# Patient Record
Sex: MALE | Race: WHITE | Employment: FULL TIME | URBAN - METROPOLITAN AREA
[De-identification: names, ages, dates, MRNs, and addresses within clinical notes are randomized per-mention and may not be internally consistent; named-entity substitution may affect disease eponyms.]

---

## 2018-08-08 PROBLEM — E66.01 SEVERE OBESITY (BMI 35.0-39.9): Status: ACTIVE | Noted: 2018-08-08

## 2020-03-16 ENCOUNTER — HOSPITAL ENCOUNTER (OUTPATIENT)
Dept: PHYSICAL THERAPY | Age: 70
Discharge: HOME | End: 2020-03-16
Attending: INTERNAL MEDICINE
Payer: MEDICARE

## 2020-03-16 NOTE — THERAPY EVALUATION
Kareen Scherer  : 1950  [place] / [place] / Product Type: Medicare /  [address]  at [facility]  [address], [address] [facility], [address]  [phone]   [phone]        OUTPATIENT PHYSICAL THERAPY:Initial Assessment 3/16/2020    ICD-10: Treatment Diagnosis:   M25.511 Pain in Right Shoulder  M25.611 Stiffness in Right Shoulder  M62.81 Muscle Weakness Generalized    Precautions/Allergies:   Patient has no known allergies. Fall Risk Score: 5 (? 5 = High Risk)  MD Orders: Referral to Physical Therapy MEDICAL/REFERRING DIAGNOSIS:  Nontraumatic shoulder pain, right [M25.511]  Adhesive capsulitis of right shoulder [M75.01]   DATE OF ONSET: 2 months ago  REFERRING PHYSICIAN: Jamia Mario MD  RETURN PHYSICIAN APPOINTMENT: TBD by pt     ASSESSMENT:  Mr. Mello Lucas has attended 1 physical therapy session including the initial evaluation. Pt presents with s/s consistent with referring diagnosis. AROM and PROM limited in similar ranges. Pt has difficulty and pain with ROM. Pt modifying yard work, dressing, and bathing activities. Currently operating below prior level of function with decreased range, decreased strength and increased pain. Recommending skilled PT: manual therapeutic techniques (as appropriate), therapeutic exercises and activities, balance interventions, and a comprehensive home exercise program to address the current impairments, as listed above. Kareen Scherer will benefit from skilled PT (medically necessary) to address above deficits affecting participation in basic ADLs and overall functional tolerance. PROBLEM LIST (Impacting functional limitations):  1. Decreased Strength  2. Decreased ADL/Functional Activities  3. Increased Pain  4. Decreased Flexibility/Joint Mobility  5. Edema/Girth  6.  Decreased [place] with Home Exercise Program INTERVENTIONS PLANNED:  1. Bed Mobility  2. Cold  3. Cryotherapy  4. Electrical Stimulation  5. Family Education  6. Heat  7. Home Exercise Program (HEP)  8. Manual Therapy  9. Neuromuscular Re-education/Strengthening  10. Range of Motion (ROM)  11. Therapeutic Activites  12. Therapeutic Exercise/Strengthening  13. Transcutaneous Electrical Nerve Stimulation (TENS)  14. Transfer Training   TREATMENT PLAN:  TREATMENT PLAN:  Effective Dates: 3/16/2020 TO 6/14/2020 (90 days). Frequency/Duration: 3 times a week (for the first 2 weeks, then 2 times a week) for 90 Days  GOALS: (Goals have been discussed and agreed upon with patient.)  Discharge Goals: Time Frame: 6 weeks  PT will be independent with HEP. Pt will decrease worst pain to 2/10 or less in order to return to cooking ADLs  Pt will reduce DASH to 12 or less in order to return to personal hygiene ADLs  Pt will improve gross LUE strength to 4+/5 or greater in order to return to yardwork  Pt will demonstrate active LT shoulder Flexion and Abduction of 140 or greater in order to return OH ADL    Rehabilitation Potential For Stated Goals: Good  Regarding Kunal Alexander's therapy, I certify that the treatment plan above will be carried out by a therapist or under their direction. Thank you for this referral,  Raheem Do, PT, DPT     Referring Physician Signature: Mirta Mcqueen MD              Date                    The information in this section was collected on 3/16/2020 (except where otherwise noted). HISTORY:   History of Present Injury/Illness (Reason for Referral): About 2 month ago shoulder started hurting. Thought it was a pulled muscle. Not sure what he was doing when it started hurting. Fell on shoulder about 2 weeks after it started hurting. Over the last 2 months, ROM has gotten some better. Usually pretty active, a lot of woodworking.  No instances of shoulder pain in the past.  CC/Primary Concern:        \"My only problem is taking it out of the range it wants to go.\"    Treatment Side: Right    Hospitalization and time spent in care: none    Past Medical History/Comorbidities:   Mr. Omaira Caldera  has a past medical history of Atrial fibrillation ([facility] (9/15/2013), Benign neoplasm of pineal gland ([facility] (9/15/2013), CAD (coronary artery disease), DM (diabetes mellitus) new onset  (6/26/2012), Essential hypertension, benign (9/15/2013), HLD (hyperlipidemia), HTN (hypertension), Hypercholesterolemia, Hypertension (7/12/2013), Irregular heart beat, Obesity, Other and unspecified hyperlipidemia (9/15/2013), Other psoriasis (9/15/2013), Peripheral vascular disease, unspecified [facility] (9/15/2013), Psoriasis, Type 2 diabetes mellitus ([facility] (2012), and Unspecified sleep apnea. He also has no past medical history of Difficult intubation, Malignant hyperthermia due to anesthesia, Nausea & vomiting, Pseudocholinesterase deficiency, or Unspecified adverse effect of anesthesia. Mr. Omaira Caldera  has a past surgical history that includes pr left heart cath,percutaneous (6/27/2012); hx svt ablation; and hx carotid endarterectomy (Right, 07/2013). Social History/Living Environment:     Lives at home alone. Has a support system. Pain/Symptom Location: RT shoulder    Worst Pain: 9/10  Current Pain: 0/10    Aggravating Factors: Reaching up, reaching behind, dressing. Alleviating Factors/Positions/Motions: Stopping motion. General Health: Good    Diagnostic Imaging: X-Rays    Occupation: retired (Still does some woodworking)    Unexplained Weight Loss: No      Prior Level of Function/Work/Activity:  Fully ind    Patient Goals: Return to [place]    Current Medications:       Current Outpatient Medications:     amLODIPine (NORVASC) 5 mg tablet, Take 1 Tab by mouth daily. TAKE 1 TABLET BY MOUTH DAILY, Disp: 90 Tab, Rfl: 3    irbesartan (AVAPRO) 300 mg tablet, Take 1 Tab by mouth daily.  TAKE 1 TABLET BY MOUTH NIGHTLY, Disp: 90 Tab, Rfl: 3    apixaban (ELIQUIS) 5 mg tablet, Take 1 Tab by mouth two (2) times a day. TAKE 1 TABLET BY MOUTH TWICE DAILY, Disp: 180 Tab, Rfl: 3    dapagliflozin (FARXIGA) 10 mg tab tablet, Take 1 Tab by mouth daily. TAKE 1 TABLET BY MOUTH EVERY DAY, Disp: 90 Tab, Rfl: 3    carvediloL (COREG) 12.5 mg tablet, Take 1 Tab by mouth two (2) times daily (with meals). TAKE 1 TABLET BY MOUTH TWICE DAILY WITH MEALS, Disp: 180 Tab, Rfl: 3    diclofenac (VOLTAREN) 1 % gel, Apply 2 g to affected area four (4) times daily for 30 days. , Disp: 3 Each, Rfl: 0    atorvastatin (LIPITOR) 80 mg tablet, TAKE 1 TABLET BY MOUTH EVERY DAY, Disp: 90 Tab, Rfl: 0    metFORMIN (GLUCOPHAGE) 1,000 mg tablet, Take 1 Tab by mouth two (2) times daily (with meals). , Disp: 180 Tab, Rfl: 4    glucose blood VI test strips (ONETOUCH ULTRA BLUE TEST STRIP) strip, CHECK BS QID  DX E 11.9, Disp: 450 Strip, Rfl: 3    lancets 33 gauge misc, Check BS BID Dx: E11.9, Disp: 200 Lancet, Rfl: 3    Blood-Glucose Meter (ONETOUCH VERIO IQ METER) misc, Use as dir   Dx250.00, Disp: 1 Each, Rfl: 1    aspirin 81 mg chewable tablet, Take 81 mg by mouth every morning., Disp: , Rfl:    Date Last Reviewed:  3/16/2020       Ambulatory/Rehab Services H2 Model Falls Risk Assessment    Risk Factors:       (1)  Gender [Male]       (5)  History of Recent Falls [w/in 3 months] Ability to Rise from Chair:       (0)  Ability to rise in a single movement    Falls Prevention Plan:       No modifications necessary   Total: (5 or greater = High Risk): 6    ©2010 [facility] of [employer]. All Rights Reserved. [facility] States Patent #0,489,023. Federal Law prohibits the replication, distribution or use without written permission from [employer]        Number of Personal Factors/Comorbidities that affect the Plan of Care: 3+: HIGH COMPLEXITY   EXAMINATION:   Inspection        Patient Consent: Yes    ________________________________________________________________________________________________  Observation:        Posture:  Forward Head, Ant tipped RT shoulder, Rolled Shoulders            Muscle Guarding: RT scapular muscles        Edema: No Edema, Erythema or Ecchymosis noted          Pitting: No        Movement Apprehension: Elevation of RT shoulder, Ext of RT shoulder, and ER of RT shoulder      ________________________________________________________________________________________________  Range of Motion            Upper:  Joint: Passive Active    Right (Degrees) Right (Degrees)   Shoulder Flexion 110 90   Shoulder Extension  35   Shoulder Abduction 100 75   Shoulder Internal Rotation (Neutral Position)     Shoulder External Rotation  (Neutral Position)     Functional IR  Sacrum   Functional ER  Sub-Occipital         Additional Comments: While attempted to flex elbow, pt experienced increased pain in shoulder. Unable to assess passive ER and IR  ________________________________________________________________________________________________  Strength          Upper Extremity    Joint:     RIGHT   Shoulder Flexion 4/5   Shoulder Extension 4/5   Shoulder Abduction 4/5   Shoulder Internal Rotation 5/5   Shoulder External Rotation 4/5   Elbow Flexion 5/5   Elbow Extension 5/5   ________________________________________________________________________________________________  [facility]        C/O Radicular Symptoms: No        UE NEUROLOGICAL SCREEN: All Dermatomes, Myotomes and Reflexes WNL     ________________________________________________________________________________________________  Special Tests        Shoulder:        AC Joint Compression: Neg, Biceps Load: Neg, Load and Shift: Neg, Theola Seek: Pos and Drop Arm: Neg           ________________________________________________________________________________________________  Palpation: Increased tenderness in scapular muscles and RT shoulder deltoids, Upper traps and levators. Body Structures Involved:  1. Joints  2. Muscles  3.  Ligaments Body Functions Affected:  1. Sensory/Pain  2. Movement Related Activities and Participation Affected:  1. Self Care  2. Domestic Life  3. Interpersonal Interactions and Relationships   Number of elements (examined above) that affect the Plan of Care: 4+: HIGH COMPLEXITY   CLINICAL PRESENTATION:   Presentation: Stable and uncomplicated: LOW COMPLEXITY   CLINICAL DECISION MAKING:   Outcome Measure: Tool Used: Disabilities of the Arm, Shoulder and Hand (DASH) Questionnaire - Quick Version  Score:  Initial: 23/55  Most Recent: X/55 (Date: -- )   Interpretation of Score: The DASH is designed to measure the activities of daily living in person's with upper extremity dysfunction or pain. Each section is scored on a 1-5 scale, 5 representing the greatest disability. The scores of each section are added together for a total score of 55. Medical Necessity:   · Patient is expected to demonstrate progress in strength, range of motion, coordination and functional technique to improve OH, dressing, yardwork and bathing ADLs. Reason for Services/Other Comments:  · Patient continues to require skilled intervention due to pain, weakness, decreased ROM and dyfunction of RUE.    Use of outcome tool(s) and clinical judgement create a POC that gives a: Clear prediction of patient's progress: LOW COMPLEXITY

## 2020-03-16 NOTE — PROGRESS NOTES
Mikaela Jacobo  : 1950  [place] / Product Type: Medicare /  [address] [place]  at [facility]  [address], [address] [facility], [address]  [phone]   [phone]      OUTPATIENT PHYSICAL THERAPY: Daily Treatment Note 3/16/2020  Visit Count:  1    ICD-10: Treatment Diagnosis:   M25.511 Pain in Right Shoulder  M25.611 Stiffness in Right Shoulder  M62.81 Muscle Weakness Generalized     Precautions/Allergies:   Patient has no known allergies. Fall Risk Score: 5 (? 5 = High Risk)    TREATMENT PLAN:  Effective Dates: 3/16/2020 TO 2020 (90 days). Frequency/Duration: 3 times a week (for 2 weeks then 2 times a week) for 90 Days        PRE-TREATMENT SYMPTOMS/COMPLAINTS:  See Eval    MEDICATIONS REVIEWED:  3/16/2020   TREATMENT:   (In addition to Assessment/Re-Assessment sessions the following treatments were rendered)    THERAPEUTIC EXERCISE: (15 minutes):  Exercises per grid below to improve mobility, strength and balance. Required minimal visual and verbal cues to promote proper body alignment and promote proper body posture. Progressed resistance, range and complexity of movement as indicated. Date:  3/16/2020 Date:   Date:     Activity/Exercise Parameters Parameters Parameters   Pulleys x5 min flexion     Flexion Step Backs x3 with 10 sec hold     AAROM with Dowel Abd and Flexion in supine x 5 with 30 sec hold     Pendulums x30 sec                         MANUAL THERAPY: (10 minutes): Joint mobilization, Soft tissue mobilization and Manipulation was utilized and necessary because of the patient's restricted joint motion, painful spasm, loss of articular motion and restricted motion of soft tissue.    +PROM in Flexion, Abd, ER, and IR  +Manual stretch into flexion and Abd    (Used abbreviations: MET - muscle energy technique; PNF - proprioceptive neuromuscular facilitation; NMR - neuromuscular re-education; AP - anterior to posterior; PA - posterior to anterior)    MODALITIES: (0 minutes):      None Today     TREATMENT/SESSION ASSESSMENT:  Gloria Alexander verbalized understanding of role of PT and POC. Pt with good response to mobility as he gained 5-10 deg in each direction (Flexion and abduction). Pt has scapular muscles that guard and decreased overall ROM causing current dysfunction. Education: All TherEx prescribed as HEP    RECOMMENDATIONS/INTENT FOR NEXT TREATMENT SESSION: \"Next visit will focus on advancements to more challenging activities\".     PAIN: Initial: 0/10 Post Session:  3/10     [facility] Portal    Total Treatment Billable Duration:  PT Patient Time In/Time Out  Time In: 5490  Time Out: 0840  Winsome Espino, PT, DPT    Future Appointments   Date Time Provider Tato Apoorva   3/18/2020  9:00 AM [facility] ULTRASOUND 2 SSA [facility] VSA   3/18/2020  9:30 AM Adrien Riggs NP [facility]SA [facility] VSA   3/18/2020  1:45 PM [facility] S[facility]   3/20/2020  8:45 AM Doreen Haas, PTA SFD[facility]PT SFD   3/23/2020  8:00 AM [facility] SFD   3/25/2020  8:45 AM Doreen Haas PTA [facility]PT SFD   3/27/2020  8:45 AM [facility] SFD   3/30/2020  8:45 AM [facility] SFD   4/1/2020  8:45 AM Doreen Haas PTA SFDORPT SFD   4/6/2020  8:45 AM [facility] SFD   4/8/2020  8:45 AM [facility] SFD   4/21/2020 10:15 AM Ruthann Davila MD SSA CIM CIM

## 2020-03-18 ENCOUNTER — HOSPITAL ENCOUNTER (OUTPATIENT)
Dept: PHYSICAL THERAPY | Age: 70
Discharge: HOME | End: 2020-03-18
Attending: INTERNAL MEDICINE
Payer: MEDICARE

## 2020-03-18 PROCEDURE — 97110 THERAPEUTIC EXERCISES: CPT

## 2020-03-18 PROCEDURE — 97140 MANUAL THERAPY 1/> REGIONS: CPT

## 2020-03-18 NOTE — PROGRESS NOTES
Katie Alexis  : 1950  [place] / Product Type: Medicare /  [address]  at [facility]  [address], [address], [address] [place] [address] [address] [facility]  [phone]   [phone]      OUTPATIENT PHYSICAL THERAPY: Daily Treatment Note 3/18/2020  Visit Count:  2    ICD-10: Treatment Diagnosis:   M25.511 Pain in Right Shoulder  M25.611 Stiffness in Right Shoulder  M62.81 Muscle Weakness Generalized     Precautions/Allergies:   Patient has no known allergies. Fall Risk Score: 5 (? 5 = High Risk)    TREATMENT PLAN:  Effective Dates: 3/16/2020 TO 2020 (90 days). Frequency/Duration: 3 times a week (for 2 weeks then 2 times a week) for 90 Days        PRE-TREATMENT SYMPTOMS/COMPLAINTS:  \"Not sure what it was last time, but I feel like my range is getting better. \" Some pain but progressing well. Not started functional IR stretch     MEDICATIONS REVIEWED:  3/18/2020   TREATMENT:   (In addition to Assessment/Re-Assessment sessions the following treatments were rendered)    THERAPEUTIC EXERCISE: (30 minutes):  Exercises per grid below to improve mobility, strength and balance. Required minimal visual and verbal cues to promote proper body alignment and promote proper body posture. Progressed resistance, range and complexity of movement as indicated.      Date:  3/16/2020 Date:  20 Date:     Activity/Exercise Parameters Parameters Parameters   Pulleys x5 min flexion x5min flexion  x3 min abduction     Flexion Step Backs x3 with 10 sec hold     AAROM with Dowel Abd and Flexion in supine x 5 with 30 sec hold Flexion standing x 20 with 2sec    Pendulums x30 sec     Behind back IR  [id number] hold with wand    Wall walks   x10 with 10sec hold    UBE  x6 min (3CW &CCW)      MANUAL THERAPY: (10 minutes): Joint mobilization, Soft tissue mobilization and Manipulation was utilized and necessary because of the patient's restricted joint motion, painful spasm, loss of articular motion and restricted motion of soft tissue. +PROM in Flexion, Abd, ER, and IR  +Manual stretch into flexion and Abd    (Used abbreviations: MET - muscle energy technique; PNF - proprioceptive neuromuscular facilitation; NMR - neuromuscular re-education; AP - anterior to posterior; PA - posterior to anterior)    MODALITIES: (0 minutes):      None Today     TREATMENT/SESSION ASSESSMENT:  Fredrick Shaw presenting with continued loss of ROM and gross shoulder joint stiffness. Greatest loss currently in elevation and ER. Pt given pulley in session today and instructed to continue range exercises until clinic reopens in April from current COVID-19 precautions. Education: All TherEx prescribed as HEP    RECOMMENDATIONS/INTENT FOR NEXT TREATMENT SESSION: \"Next visit will focus on advancements to more challenging activities\".     PAIN: Initial: 0/10 Post Session:  3/10     [facility] Portal    Total Treatment Billable Duration:  PT Patient Time In/Time Out  Time In: 8012  Time Out: [address] [facility], PT, DPT    Future Appointments   Date Time Provider Tato Guerin   3/20/2020  7:00 PM Mariela Manzo [facility] SFD   3/23/2020  7:00 PM [facility] SFD   3/25/2020  7:00 PM Mariela Manzo [facility] SFD   3/27/2020  7:00 PM [facility] SFD   3/30/2020  7:00 PM [facility] SFD   4/1/2020  8:45 AM Mariela Manzo [facility] SFD   4/6/2020  8:45 AM [facility] SFD   4/8/2020  8:45 AM [facility] SFD   4/21/2020 10:15 AM Dustin Rajan MD [facility]

## 2020-07-30 NOTE — THERAPY DISCHARGE
Inderjit Sheppard  : 1950  [place] / [place] / Product Type: Medicare /  [address] [place]  at [address] [facility], [address] [place] [address], [address]  [phone]   [phone]        OUTPATIENT PHYSICAL THERAPY:Discontinuation Summary 2020    ICD-10: Treatment Diagnosis:   M25.511 Pain in Right Shoulder  M25.611 Stiffness in Right Shoulder  M62.81 Muscle Weakness Generalized    Precautions/Allergies:   Patient has no known allergies. Fall Risk Score: 5 (? 5 = High Risk)  MD Orders: Referral to Physical Therapy MEDICAL/REFERRING DIAGNOSIS:  Pain in right shoulder [M25.511]  Adhesive capsulitis of right shoulder [M75.01]   DATE OF ONSET: 2 months ago  REFERRING PHYSICIAN: Xena Bernardo MD  RETURN PHYSICIAN APPOINTMENT: TBD by pt     ASSESSMENT:  Inderjit Sheppard has been seen in physical therapy from 20 to 20 for 2 visits. Treatment has been discontinued at this time due to patient failing to return for additional treatment following pandemic shutdown. Some goals were not met due to failure to reassess. Thank you for this referral.         PROBLEM LIST (Impacting functional limitations):  1. Decreased Strength  2. Decreased ADL/Functional Activities  3. Increased Pain  4. Decreased Flexibility/Joint Mobility  5. Edema/Girth  6. Decreased [place] with Home Exercise Program INTERVENTIONS PLANNED:  1. Bed Mobility  2. Cold  3. Cryotherapy  4. Electrical Stimulation  5. Family Education  6. Heat  7. Home Exercise Program (HEP)  8. Manual Therapy  9. Neuromuscular Re-education/Strengthening  10. Range of Motion (ROM)  11. Therapeutic Activites  12. Therapeutic Exercise/Strengthening  13. Transcutaneous Electrical Nerve Stimulation (TENS)  14. Transfer Training   TREATMENT PLAN:  TREATMENT PLAN:  Effective Dates: 3/16/2020 TO 2020 (90 days).  Frequency/Duration: 3 times a week (for the first 2 weeks, then 2 times a week) for 90 Days  GOALS: (Goals have been discussed and agreed upon with patient.)  Discharge Goals: Time Frame: 6 weeks  PT will be independent with HEP. Pt will decrease worst pain to 2/10 or less in order to return to cooking ADLs  Pt will reduce DASH to 12 or less in order to return to personal hygiene ADLs  Pt will improve gross LUE strength to 4+/5 or greater in order to return to yardwork  Pt will demonstrate active LT shoulder Flexion and Abduction of 140 or greater in order to return OH ADL      Regarding [facility] therapy, I certify that the treatment plan above will be carried out by a therapist or under their direction. Thank you for this referral,  Inderjit Bonds, PT, DPT                 The information in this section was collected on 03/16/20 (except where otherwise noted). HISTORY:   History of Present Injury/Illness (Reason for Referral): About 2 month ago shoulder started hurting. Thought it was a pulled muscle. Not sure what he was doing when it started hurting. Fell on shoulder about 2 weeks after it started hurting. Over the last 2 months, ROM has gotten some better. Usually pretty active, a lot of woodworking. No instances of shoulder pain in the past.  CC/Primary Concern:        \"My only problem is taking it out of the range it wants to go. \"    Treatment Side: Right    Hospitalization and time spent in care: none    Past Medical History/Comorbidities:   Mr. Subha Cartagena  has a past medical history of Atrial fibrillation ([facility] [address] (9/15/2013), Benign neoplasm of pineal gland ([facility] [address] (9/15/2013), CAD (coronary artery disease), DM (diabetes mellitus) new onset  (6/26/2012), Essential hypertension, benign (9/15/2013), HLD (hyperlipidemia), HTN (hypertension), Hypercholesterolemia, Hypertension (7/12/2013), Irregular heart beat, Obesity, Other and unspecified hyperlipidemia (9/15/2013), Other psoriasis (9/15/2013), Peripheral vascular disease, unspecified ([facility] [address] (9/15/2013), Psoriasis, Type 2 diabetes mellitus ([facility] [address] (2012), and Unspecified sleep apnea. He also has no past medical history of Difficult intubation, Malignant hyperthermia due to anesthesia, Nausea & vomiting, Pseudocholinesterase deficiency, or Unspecified adverse effect of anesthesia. Mr. Neri Boyer  has a past surgical history that includes pr left heart cath,percutaneous (6/27/2012); hx svt ablation; and hx carotid endarterectomy (Right, 07/2013). Social History/Living Environment:     Lives at home alone. Has a support system. Pain/Symptom Location: RT shoulder    Worst Pain: 9/10  Current Pain: 0/10    Aggravating Factors: Reaching up, reaching behind, dressing. Alleviating Factors/Positions/Motions: Stopping motion. General Health: Good    Diagnostic Imaging: X-Rays    Occupation: retired (Still does some woodworking)    Unexplained Weight Loss: No      Prior Level of Function/Work/Activity:  Fully ind    Patient Goals: Return to [employer]    Current Medications:       Current Outpatient Medications:     amLODIPine (NORVASC) 5 mg tablet, Take 1 Tab by mouth daily. TAKE 1 TABLET BY MOUTH DAILY, Disp: 90 Tab, Rfl: 3    irbesartan (AVAPRO) 300 mg tablet, Take 1 Tab by mouth daily. TAKE 1 TABLET BY MOUTH NIGHTLY, Disp: 90 Tab, Rfl: 3    apixaban (ELIQUIS) 5 mg tablet, Take 1 Tab by mouth two (2) times a day. TAKE 1 TABLET BY MOUTH TWICE DAILY, Disp: 180 Tab, Rfl: 3    dapagliflozin (FARXIGA) 10 mg tab tablet, Take 1 Tab by mouth daily. TAKE 1 TABLET BY MOUTH EVERY DAY, Disp: 90 Tab, Rfl: 3    carvediloL (COREG) 12.5 mg tablet, Take 1 Tab by mouth two (2) times daily (with meals). TAKE 1 TABLET BY MOUTH TWICE DAILY WITH MEALS, Disp: 180 Tab, Rfl: 3    atorvastatin (LIPITOR) 80 mg tablet, TAKE 1 TABLET BY MOUTH EVERY DAY, Disp: 90 Tab, Rfl: 0    metFORMIN (GLUCOPHAGE) 1,000 mg tablet, Take 1 Tab by mouth two (2) times daily (with meals). , Disp: 180 Tab, Rfl: 4    glucose blood VI test strips (ONETOUCH ULTRA BLUE TEST STRIP) strip, CHECK BS QID  DX E 11.9, Disp: 450 Strip, Rfl: 3    lancets 33 gauge misc, Check BS BID Dx: E11.9, Disp: 200 Lancet, Rfl: 3    Blood-Glucose Meter (ONETOUCH VERIO IQ METER) misc, Use as dir   Dx250.00, Disp: 1 Each, Rfl: 1    aspirin 81 mg chewable tablet, Take 81 mg by mouth every morning., Disp: , Rfl:    Date Last Reviewed:  3/18/2020       Ambulatory/Rehab Services H2 Model Falls Risk Assessment    Risk Factors:       (1)  Gender [Male]       (5)  History of Recent Falls [w/in 3 months] Ability to Rise from Chair:       (0)  Ability to rise in a single movement    Falls Prevention Plan:       No modifications necessary   Total: (5 or greater = High Risk): 6    ©2010 [facility]. All Rights Reserved. [facility] Patent #[id number]. Federal Law prohibits the replication, distribution or use without written permission from [facility] [employer]        Number of Personal Factors/Comorbidities that affect the Plan of Care: 3+: HIGH COMPLEXITY   EXAMINATION:   Body Structures Involved:  1. Joints  2. Muscles  3. Ligaments Body Functions Affected:  1. Sensory/Pain  2. Movement Related Activities and Participation Affected:  1. Self Care  2. Domestic Life  3. Interpersonal Interactions and Relationships   Number of elements (examined above) that affect the Plan of Care: 4+: HIGH COMPLEXITY   CLINICAL PRESENTATION:   Presentation: Stable and uncomplicated: LOW COMPLEXITY   CLINICAL DECISION MAKING:   Outcome Measure: Tool Used: Disabilities of the Arm, Shoulder and Hand (DASH) Questionnaire - Quick Version  Score:  Initial: 23/55  Most Recent: X/55 (Date: -- )   Interpretation of Score: The DASH is designed to measure the activities of daily living in person's with upper extremity dysfunction or pain. Each section is scored on a 1-5 scale, 5 representing the greatest disability. The scores of each section are added together for a total score of 55.            Use of outcome tool(s) and clinical judgement create a POC that gives a: Clear prediction of patient's progress: LOW COMPLEXITY

## 2022-06-13 ENCOUNTER — INITIAL CONSULT (OUTPATIENT)
Dept: CARDIOLOGY CLINIC | Age: 72
End: 2022-06-13
Payer: MEDICARE

## 2022-06-13 VITALS
DIASTOLIC BLOOD PRESSURE: 88 MMHG | HEIGHT: 69 IN | HEART RATE: 109 BPM | SYSTOLIC BLOOD PRESSURE: 130 MMHG | WEIGHT: 265 LBS | BODY MASS INDEX: 39.25 KG/M2

## 2022-06-13 DIAGNOSIS — I25.10 CORONARY ARTERY DISEASE INVOLVING NATIVE HEART, UNSPECIFIED VESSEL OR LESION TYPE, UNSPECIFIED WHETHER ANGINA PRESENT: ICD-10-CM

## 2022-06-13 DIAGNOSIS — I10 ESSENTIAL HYPERTENSION, BENIGN: ICD-10-CM

## 2022-06-13 DIAGNOSIS — I48.91 ATRIAL FIBRILLATION, UNSPECIFIED TYPE (HCC): ICD-10-CM

## 2022-06-13 DIAGNOSIS — R07.9 CHEST PAIN, UNSPECIFIED TYPE: ICD-10-CM

## 2022-06-13 DIAGNOSIS — E78.5 DYSLIPIDEMIA: ICD-10-CM

## 2022-06-13 DIAGNOSIS — I65.29 STENOSIS OF CAROTID ARTERY, UNSPECIFIED LATERALITY: Primary | ICD-10-CM

## 2022-06-13 PROCEDURE — 3017F COLORECTAL CA SCREEN DOC REV: CPT | Performed by: INTERNAL MEDICINE

## 2022-06-13 PROCEDURE — 93000 ELECTROCARDIOGRAM COMPLETE: CPT | Performed by: INTERNAL MEDICINE

## 2022-06-13 PROCEDURE — 1123F ACP DISCUSS/DSCN MKR DOCD: CPT | Performed by: INTERNAL MEDICINE

## 2022-06-13 PROCEDURE — 1036F TOBACCO NON-USER: CPT | Performed by: INTERNAL MEDICINE

## 2022-06-13 PROCEDURE — G8417 CALC BMI ABV UP PARAM F/U: HCPCS | Performed by: INTERNAL MEDICINE

## 2022-06-13 PROCEDURE — 99204 OFFICE O/P NEW MOD 45 MIN: CPT | Performed by: INTERNAL MEDICINE

## 2022-06-13 PROCEDURE — G8428 CUR MEDS NOT DOCUMENT: HCPCS | Performed by: INTERNAL MEDICINE

## 2022-06-13 RX ORDER — NITROGLYCERIN 0.4 MG/1
0.4 TABLET SUBLINGUAL EVERY 5 MIN PRN
Qty: 25 TABLET | Refills: 3 | Status: SHIPPED | OUTPATIENT
Start: 2022-06-13

## 2022-06-13 ASSESSMENT — ENCOUNTER SYMPTOMS
EYE PAIN: 0
ABDOMINAL PAIN: 0
APHONIA: 0
NAIL CHANGES: 0
STRIDOR: 0
COUGH: 0

## 2022-06-13 NOTE — PROGRESS NOTES
[address], [address]  PHONE: [phone]    SUBJECTIVE:   Rachel Valladares is a 73 y.o. male 1950   seen for a consultation visit regarding the following:     Chief Complaint   Patient presents with    Coronary Artery Disease     2/15/2018        Consultation is requested by Tammi Eckert MD  for evaluation of Coronary Artery Disease (2/15/2018)    History of Present Illness: The patient presented for consultation  02/15/2018. Patient with history of coronary artery disease and underwent cardiac catheterization and stenting last in 2012. The patient had additional history of atrial fibrillation and atrial flutter. He presented 02/15/2018 with no changes in symptoms, but has gained a considerable amount of weight over the past several years. He has been treated for this by Dr. Lali Hendrickson, who recommended additional weight loss measures. The patient is attempting to increase his exercise. He currently walks his dogs on a daily basis. He states this is not rigorous physical activity. Additionally, he has a history of right carotid endarterectomy performed by Dr. Mila Simms.       Cardiac History:   1. Cardiac catheterization 2012 - diminished left ventricular systolic function, EF 89%, high grade LAD disease, status post PCI and moderate diffuse disease involving the circumflex and right coronary artery. 2. Atrial flutter ablation. Right sided atrial flutter. 3. The patient is on chronic anticoagulation therapy for atrial fibrillation. 4. History of right carotid endarterectomy.      Assessment and Plan:   1. Coronary disease. 2. Carotid disease. · Previous history of carotid endarterectomy. · Will need serial imaging.    3. Hypertension  Key CAD CHF Meds          amLODIPine (NORVASC) 5 MG tablet (Taking)    Class: Historical Med    apixaban (ELIQUIS) 5 MG TABS tablet (Taking)    Class: Historical Med    atorvastatin (LIPITOR) 80 MG tablet (Taking) Class: Historical Med    carvedilol (COREG) 12.5 MG tablet (Taking)    Class: Historical Med    irbesartan (AVAPRO) 300 MG tablet (Taking)    Class: Historical Med          4. Atrial fibrillation. 5. Aortic stenosis echo pending  >5 years since last echo. Past Medical History, Past Surgical History, Family history, Social History, and Medications were all reviewed with the patient today and updated as necessary. No Known Allergies  Past Medical History:   Diagnosis Date    Atrial fibrillation ([facility] [address] 9/15/2013    Benign neoplasm of pineal gland ([facility] [address] 9/15/2013    CAD (coronary artery disease)     stent x 1     DM (diabetes mellitus) ([facility] [address] 6/26/2012    Essential hypertension, benign 9/15/2013    HLD (hyperlipidemia)     managed with medication     HTN (hypertension)     managed with medication     Hypercholesterolemia     Hypertension 7/12/2013    Irregular heart beat     cardiac ablation corrected    Obesity     BMI 37.5    Other and unspecified hyperlipidemia 9/15/2013    Other psoriasis 9/15/2013    Peripheral vascular disease, unspecified ([facility] [address] 9/15/2013    Psoriasis     Type 2 diabetes mellitus ([facility] [address] 2012    insulin reliant/ Avg FBS 85-90/ no S/S of low BS     Unspecified sleep apnea     no tx at this time. Past Surgical History:   Procedure Laterality Date    ABLATION OF DYSRHYTHMIC FOCUS      CAROTID ENDARTERECTOMY Right 07/2013    LEFT HEART CATH,PERCUTANEOUS  6/27/2012    Xience to mid LAD     Family History   Problem Relation Age of Onset    Diabetes Mother     Cancer Father     Heart Disease Mother     Hypertension Mother      Social History     Tobacco Use    Smoking status: Never Smoker    Smokeless tobacco: Never Used   Substance Use Topics    Alcohol use: Yes       ROS:    Review of Systems   Constitutional: Negative for fever. HENT: Negative for stridor. Eyes: Negative for pain. Cardiovascular: Negative for chest pain.    Respiratory: Negative for cough.    Endocrine: Negative for cold intolerance. Skin: Negative for nail changes. Musculoskeletal: Negative for arthritis. Gastrointestinal: Negative for abdominal pain. Genitourinary: Negative for dysuria. Neurological: Negative for aphonia. Psychiatric/Behavioral: Negative for altered mental status. Allergic/Immunologic: Negative for hives. PHYSICAL EXAM:   BP (!) 136/92   Pulse (!) 109   Ht 5' 9\" (1.753 m)   Wt 265 lb (120.2 kg)   BMI 39.13 kg/m²      Physical Exam  Vitals reviewed. HENT:      Head: Normocephalic. Right Ear: External ear normal.      Left Ear: External ear normal.      Nose: Nose normal.   Eyes:      General: No scleral icterus. Pulmonary:      Effort: Pulmonary effort is normal.   Abdominal:      General: There is no distension. Musculoskeletal:      Cervical back: Neck supple. Skin:     General: Skin is warm. Neurological:      Mental Status: He is alert. Mental status is at baseline. Medical problems and test results were reviewed with the patient today. No results found for any visits on 06/13/22. No results found for this or any previous visit (from the past 672 hour(s)). Lab Results   Component Value Date    CHOL 150 03/09/2020    HDL 28 03/09/2020            PLAN    Gamal Michelle was seen today for coronary artery disease. Diagnoses and all orders for this visit:    Carotid artery stenosis  -     EKG 12 Lead    Essential hypertension, benign  -     EKG 12 Lead    CAD (coronary artery disease)  -     EKG 12 Lead    Dyslipidemia  -     EKG 12 Lead    Chest pain, unspecified type  -     Transthoracic echocardiogram (TTE) complete with contrast, bubble, strain, and 3D PRN; Future  -     Nuclear stress test with myocardial perfusion; Future    Atrial fibrillation, unspecified type ([address]  -     Extended cardiac holter monitor (48 hrs - 15 days);  Future    Other orders  -     nitroGLYCERIN (NITROSTAT) 0.4 MG SL tablet; Place 1 tablet under the tongue every 5 minutes as needed for Chest pain up to max of 3 total doses. If no relief after 1 dose, call 911. Return in about 3 weeks (around 7/4/2022). Thank you for allowing me to participate in this patient's care. Please call or contact me if there are any questions or concerns regarding the above.       Radha West MD  06/13/22  4:08 PM      Proofread, but unrecognized errors may exist.

## 2022-06-24 ENCOUNTER — TELEPHONE (OUTPATIENT)
Dept: CARDIOLOGY CLINIC | Age: 72
End: 2022-06-24

## 2022-06-24 NOTE — TELEPHONE ENCOUNTER
Patient called stating that for the last week, he has been out to [employer], [employer] , [employer], [employer] walking around, grocery shopping, walked to neighbor's, does not have any problems, when he gets in the shower at night, he gets SOB and chest pressure for about 2-3 minutes. Sent monitor back last Saturday. No symptoms today. Wanted to know if Dr. Matheus Johnston knew what could be happening. Taking all medications:  Norvasc 5 mg  Eliquis 5 mg bid  ASA 81 mg  Lipitor 80 mg   Coreg 12.5 mg bid  Farxiga 10 mg  Metformin 1000 mg bid   Patient informed Dr. Matheus Johnston would be asked, call back with doctors response.   Patient voiced understanding//[email or web address]    Last office visit 6-13-22  Cb 6-23-22

## 2022-06-24 NOTE — TELEPHONE ENCOUNTER
Spoke with patient. Patient states he wore monitor and had stress test. Patient states is is having difficulty breathing, chest pain that comes and goes and fatigue. Please advise.

## 2022-06-28 NOTE — TELEPHONE ENCOUNTER
Pt states feels good today, slept well last night. Episodes are random, mostly when showers. Informed pt warm water may be causing blood vessels to dilate, bp drop and HR speed up. That can cause sob, tight feeling in chest. Advise drink a glass of water before shower and possibly use not as hot of water for shower. Discuss at FU. Pt voiced understanding and thanks. He states is good water drinker but has done some aggressive outdoor work recently also. Shoveled a load of mulch without incidence. The following day he felt fatigued, more sob and not able to push mow grass. Sx resolved. No cp, pressure, tightness. Pt states has worn monitor and did have shower episode while wearing the monitor. Confirmed Dr. Kianna Holloway will review and discuss at FU. Pt voiced understanding and thanks.  cgh

## 2022-07-08 ENCOUNTER — APPOINTMENT (OUTPATIENT)
Dept: GENERAL RADIOLOGY | Age: 72
DRG: 273 | End: 2022-07-08
Payer: MEDICARE

## 2022-07-08 ENCOUNTER — OFFICE VISIT (OUTPATIENT)
Dept: CARDIOLOGY CLINIC | Age: 72
End: 2022-07-08
Payer: MEDICARE

## 2022-07-08 ENCOUNTER — DIRECT ADMIT ORDERS (OUTPATIENT)
Dept: CARDIOLOGY CLINIC | Age: 72
End: 2022-07-08

## 2022-07-08 ENCOUNTER — HOSPITAL ENCOUNTER (INPATIENT)
Age: 72
LOS: 5 days | Discharge: HOME | DRG: 273 | End: 2022-07-13
Attending: STUDENT IN AN ORGANIZED HEALTH CARE EDUCATION/TRAINING PROGRAM | Admitting: INTERNAL MEDICINE
Payer: MEDICARE

## 2022-07-08 VITALS
SYSTOLIC BLOOD PRESSURE: 138 MMHG | DIASTOLIC BLOOD PRESSURE: 100 MMHG | HEIGHT: 69 IN | BODY MASS INDEX: 38.58 KG/M2 | WEIGHT: 260.5 LBS | HEART RATE: 111 BPM

## 2022-07-08 DIAGNOSIS — I48.92 ATRIAL FLUTTER (HCC): ICD-10-CM

## 2022-07-08 DIAGNOSIS — I25.118 CORONARY ARTERY DISEASE INVOLVING NATIVE CORONARY ARTERY OF NATIVE HEART WITH OTHER FORM OF ANGINA PECTORIS (HCC): ICD-10-CM

## 2022-07-08 DIAGNOSIS — I48.4 ATYPICAL ATRIAL FLUTTER (HCC): ICD-10-CM

## 2022-07-08 DIAGNOSIS — I50.21 ACUTE HFREF (HEART FAILURE WITH REDUCED EJECTION FRACTION) (HCC): Primary | ICD-10-CM

## 2022-07-08 DIAGNOSIS — I50.23 ACUTE ON CHRONIC SYSTOLIC (CONGESTIVE) HEART FAILURE (HCC): ICD-10-CM

## 2022-07-08 DIAGNOSIS — I44.7 LBBB (LEFT BUNDLE BRANCH BLOCK): ICD-10-CM

## 2022-07-08 DIAGNOSIS — I50.9 CHF (CONGESTIVE HEART FAILURE) (HCC): ICD-10-CM

## 2022-07-08 DIAGNOSIS — I50.9 ACUTE CONGESTIVE HEART FAILURE, UNSPECIFIED HEART FAILURE TYPE (HCC): Primary | ICD-10-CM

## 2022-07-08 DIAGNOSIS — R06.03 RESPIRATORY DISTRESS: ICD-10-CM

## 2022-07-08 DIAGNOSIS — I06.0 RHEUMATIC AORTIC STENOSIS: ICD-10-CM

## 2022-07-08 LAB
ALBUMIN SERPL-MCNC: 3.9 G/DL (ref 3.2–4.6)
ALBUMIN/GLOB SERPL: 1.1 {RATIO} (ref 1.2–3.5)
ALP SERPL-CCNC: 68 U/L (ref 50–136)
ALT SERPL-CCNC: 24 U/L (ref 12–65)
ANION GAP SERPL CALC-SCNC: 7 MMOL/L (ref 7–16)
AST SERPL-CCNC: 23 U/L (ref 15–37)
BASOPHILS # BLD: 0 K/UL (ref 0–0.2)
BASOPHILS NFR BLD: 0 % (ref 0–2)
BILIRUB SERPL-MCNC: 1.5 MG/DL (ref 0.2–1.1)
BUN SERPL-MCNC: 17 MG/DL (ref 8–23)
CALCIUM SERPL-MCNC: 9.6 MG/DL (ref 8.3–10.4)
CHLORIDE SERPL-SCNC: 108 MMOL/L (ref 98–107)
CO2 SERPL-SCNC: 25 MMOL/L (ref 21–32)
CREAT SERPL-MCNC: 0.7 MG/DL (ref 0.8–1.5)
DIFFERENTIAL METHOD BLD: ABNORMAL
EOSINOPHIL # BLD: 0.1 K/UL (ref 0–0.8)
EOSINOPHIL NFR BLD: 1 % (ref 0.5–7.8)
ERYTHROCYTE [DISTWIDTH] IN BLOOD BY AUTOMATED COUNT: 15 % (ref 11.9–14.6)
GLOBULIN SER CALC-MCNC: 3.5 G/DL (ref 2.3–3.5)
GLUCOSE SERPL-MCNC: 143 MG/DL (ref 65–100)
HCT VFR BLD AUTO: 45.3 % (ref 41.1–50.3)
HGB BLD-MCNC: 14.4 G/DL (ref 13.6–17.2)
IMM GRANULOCYTES # BLD AUTO: 0 K/UL (ref 0–0.5)
IMM GRANULOCYTES NFR BLD AUTO: 0 % (ref 0–5)
LYMPHOCYTES # BLD: 1.4 K/UL (ref 0.5–4.6)
LYMPHOCYTES NFR BLD: 13 % (ref 13–44)
MAGNESIUM SERPL-MCNC: 1.9 MG/DL (ref 1.8–2.4)
MCH RBC QN AUTO: 29 PG (ref 26.1–32.9)
MCHC RBC AUTO-ENTMCNC: 31.8 G/DL (ref 31.4–35)
MCV RBC AUTO: 91.3 FL (ref 79.6–97.8)
MONOCYTES # BLD: 0.6 K/UL (ref 0.1–1.3)
MONOCYTES NFR BLD: 6 % (ref 4–12)
NEUTS SEG # BLD: 8.1 K/UL (ref 1.7–8.2)
NEUTS SEG NFR BLD: 80 % (ref 43–78)
NRBC # BLD: 0 K/UL (ref 0–0.2)
NT PRO BNP: 939 PG/ML (ref 5–125)
PLATELET # BLD AUTO: 169 K/UL (ref 150–450)
PMV BLD AUTO: 10.4 FL (ref 9.4–12.3)
POTASSIUM SERPL-SCNC: 4.6 MMOL/L (ref 3.5–5.1)
PROT SERPL-MCNC: 7.4 G/DL (ref 6.3–8.2)
RBC # BLD AUTO: 4.96 M/UL (ref 4.23–5.6)
SODIUM SERPL-SCNC: 140 MMOL/L (ref 136–145)
TROPONIN I SERPL HS-MCNC: 20 PG/ML (ref 0–14)
WBC # BLD AUTO: 10.3 K/UL (ref 4.3–11.1)

## 2022-07-08 PROCEDURE — 94660 CPAP INITIATION&MGMT: CPT

## 2022-07-08 PROCEDURE — 99223 1ST HOSP IP/OBS HIGH 75: CPT | Performed by: INTERNAL MEDICINE

## 2022-07-08 PROCEDURE — 1123F ACP DISCUSS/DSCN MKR DOCD: CPT | Performed by: INTERNAL MEDICINE

## 2022-07-08 PROCEDURE — 2700000000 HC OXYGEN THERAPY PER DAY

## 2022-07-08 PROCEDURE — 85025 COMPLETE CBC W/AUTO DIFF WBC: CPT

## 2022-07-08 PROCEDURE — 96374 THER/PROPH/DIAG INJ IV PUSH: CPT

## 2022-07-08 PROCEDURE — 83036 HEMOGLOBIN GLYCOSYLATED A1C: CPT

## 2022-07-08 PROCEDURE — 93000 ELECTROCARDIOGRAM COMPLETE: CPT | Performed by: INTERNAL MEDICINE

## 2022-07-08 PROCEDURE — 99285 EMERGENCY DEPT VISIT HI MDM: CPT

## 2022-07-08 PROCEDURE — 80053 COMPREHEN METABOLIC PANEL: CPT

## 2022-07-08 PROCEDURE — 84484 ASSAY OF TROPONIN QUANT: CPT

## 2022-07-08 PROCEDURE — 6360000002 HC RX W HCPCS: Performed by: STUDENT IN AN ORGANIZED HEALTH CARE EDUCATION/TRAINING PROGRAM

## 2022-07-08 PROCEDURE — 83735 ASSAY OF MAGNESIUM: CPT

## 2022-07-08 PROCEDURE — 83880 ASSAY OF NATRIURETIC PEPTIDE: CPT

## 2022-07-08 PROCEDURE — 93005 ELECTROCARDIOGRAM TRACING: CPT | Performed by: STUDENT IN AN ORGANIZED HEALTH CARE EDUCATION/TRAINING PROGRAM

## 2022-07-08 PROCEDURE — 71045 X-RAY EXAM CHEST 1 VIEW: CPT

## 2022-07-08 PROCEDURE — 5A09357 ASSISTANCE WITH RESPIRATORY VENTILATION, LESS THAN 24 CONSECUTIVE HOURS, CONTINUOUS POSITIVE AIRWAY PRESSURE: ICD-10-PCS | Performed by: INTERNAL MEDICINE

## 2022-07-08 PROCEDURE — 1100000000 HC RM PRIVATE

## 2022-07-08 PROCEDURE — 2580000003 HC RX 258: Performed by: STUDENT IN AN ORGANIZED HEALTH CARE EDUCATION/TRAINING PROGRAM

## 2022-07-08 RX ORDER — ASPIRIN 81 MG/1
81 TABLET, CHEWABLE ORAL DAILY
Status: DISCONTINUED | OUTPATIENT
Start: 2022-07-08 | End: 2022-07-13 | Stop reason: HOSPADM

## 2022-07-08 RX ORDER — SODIUM CHLORIDE 0.9 % (FLUSH) 0.9 %
5 SYRINGE (ML) INJECTION AS NEEDED
Status: DISCONTINUED | OUTPATIENT
Start: 2022-07-08 | End: 2022-07-13 | Stop reason: HOSPADM

## 2022-07-08 RX ORDER — LOSARTAN POTASSIUM 50 MG/1
100 TABLET ORAL DAILY
Status: DISCONTINUED | OUTPATIENT
Start: 2022-07-09 | End: 2022-07-09

## 2022-07-08 RX ORDER — SODIUM CHLORIDE 9 MG/ML
INJECTION, SOLUTION INTRAVENOUS PRN
Status: DISCONTINUED | OUTPATIENT
Start: 2022-07-08 | End: 2022-07-13 | Stop reason: HOSPADM

## 2022-07-08 RX ORDER — AMLODIPINE BESYLATE 10 MG/1
5 TABLET ORAL DAILY
Status: DISCONTINUED | OUTPATIENT
Start: 2022-07-08 | End: 2022-07-10

## 2022-07-08 RX ORDER — SODIUM CHLORIDE 0.9 % (FLUSH) 0.9 %
5 SYRINGE (ML) INJECTION EVERY 8 HOURS
Status: DISCONTINUED | OUTPATIENT
Start: 2022-07-08 | End: 2022-07-13 | Stop reason: HOSPADM

## 2022-07-08 RX ORDER — POLYETHYLENE GLYCOL 3350 17 G/17G
17 POWDER, FOR SOLUTION ORAL DAILY PRN
Status: DISCONTINUED | OUTPATIENT
Start: 2022-07-08 | End: 2022-07-13 | Stop reason: HOSPADM

## 2022-07-08 RX ORDER — SODIUM CHLORIDE 0.9 % (FLUSH) 0.9 %
5-40 SYRINGE (ML) INJECTION PRN
Status: DISCONTINUED | OUTPATIENT
Start: 2022-07-08 | End: 2022-07-13 | Stop reason: HOSPADM

## 2022-07-08 RX ORDER — ONDANSETRON 4 MG/1
4 TABLET, ORALLY DISINTEGRATING ORAL EVERY 8 HOURS PRN
Status: DISCONTINUED | OUTPATIENT
Start: 2022-07-08 | End: 2022-07-13 | Stop reason: HOSPADM

## 2022-07-08 RX ORDER — INSULIN LISPRO 100 [IU]/ML
0-4 INJECTION, SOLUTION INTRAVENOUS; SUBCUTANEOUS NIGHTLY
Status: DISCONTINUED | OUTPATIENT
Start: 2022-07-08 | End: 2022-07-13 | Stop reason: HOSPADM

## 2022-07-08 RX ORDER — FUROSEMIDE 10 MG/ML
40 INJECTION INTRAMUSCULAR; INTRAVENOUS 2 TIMES DAILY
Status: DISCONTINUED | OUTPATIENT
Start: 2022-07-08 | End: 2022-07-11

## 2022-07-08 RX ORDER — ACETAMINOPHEN 650 MG/1
650 SUPPOSITORY RECTAL EVERY 6 HOURS PRN
Status: DISCONTINUED | OUTPATIENT
Start: 2022-07-08 | End: 2022-07-13 | Stop reason: HOSPADM

## 2022-07-08 RX ORDER — FUROSEMIDE 10 MG/ML
60 INJECTION INTRAMUSCULAR; INTRAVENOUS ONCE
Status: COMPLETED | OUTPATIENT
Start: 2022-07-08 | End: 2022-07-08

## 2022-07-08 RX ORDER — ONDANSETRON 2 MG/ML
4 INJECTION INTRAMUSCULAR; INTRAVENOUS EVERY 6 HOURS PRN
Status: DISCONTINUED | OUTPATIENT
Start: 2022-07-08 | End: 2022-07-13 | Stop reason: HOSPADM

## 2022-07-08 RX ORDER — ATORVASTATIN CALCIUM 80 MG/1
80 TABLET, FILM COATED ORAL NIGHTLY
Status: DISCONTINUED | OUTPATIENT
Start: 2022-07-08 | End: 2022-07-13 | Stop reason: HOSPADM

## 2022-07-08 RX ORDER — INSULIN LISPRO 100 [IU]/ML
0-4 INJECTION, SOLUTION INTRAVENOUS; SUBCUTANEOUS
Status: DISCONTINUED | OUTPATIENT
Start: 2022-07-09 | End: 2022-07-13 | Stop reason: HOSPADM

## 2022-07-08 RX ORDER — ENOXAPARIN SODIUM 100 MG/ML
40 INJECTION SUBCUTANEOUS DAILY
Status: DISCONTINUED | OUTPATIENT
Start: 2022-07-08 | End: 2022-07-08 | Stop reason: CLARIF

## 2022-07-08 RX ORDER — ACETAMINOPHEN 325 MG/1
650 TABLET ORAL EVERY 6 HOURS PRN
Status: DISCONTINUED | OUTPATIENT
Start: 2022-07-08 | End: 2022-07-13 | Stop reason: HOSPADM

## 2022-07-08 RX ORDER — CARVEDILOL 25 MG/1
12.5 TABLET ORAL 2 TIMES DAILY WITH MEALS
Status: DISCONTINUED | OUTPATIENT
Start: 2022-07-08 | End: 2022-07-10

## 2022-07-08 RX ADMIN — FUROSEMIDE 60 MG: 10 INJECTION, SOLUTION INTRAMUSCULAR; INTRAVENOUS at 17:33

## 2022-07-08 RX ADMIN — DEXTROSE 150 MG: 50 INJECTION, SOLUTION INTRAVENOUS at 18:29

## 2022-07-08 RX ADMIN — SODIUM CHLORIDE, PRESERVATIVE FREE 5 ML: 5 INJECTION INTRAVENOUS at 17:33

## 2022-07-08 RX ADMIN — AMIODARONE HYDROCHLORIDE 1 MG/MIN: 50 INJECTION, SOLUTION INTRAVENOUS at 18:31

## 2022-07-08 ASSESSMENT — ENCOUNTER SYMPTOMS
CHEST TIGHTNESS: 0
SHORTNESS OF BREATH: 1
NAUSEA: 0
WHEEZING: 0
COUGH: 0
COLOR CHANGE: 0
BACK PAIN: 0
BLOATING: 1
ABDOMINAL PAIN: 0
CONSTIPATION: 0
SHORTNESS OF BREATH: 1
VOMITING: 0
ABDOMINAL DISTENTION: 0

## 2022-07-08 ASSESSMENT — PAIN - FUNCTIONAL ASSESSMENT: PAIN_FUNCTIONAL_ASSESSMENT: NONE - DENIES PAIN

## 2022-07-08 NOTE — Clinical Note
Accessed site: right femoral vein. Femoral access needle used. Using ultrasound guidance. Number of attempts: 1. Accessed successfully.

## 2022-07-08 NOTE — H&P
chronic anticoagulation therapy for atrial fibrillation.    4.         History of right carotid endarterectomy. 5.         Aortic stenosis:                  2018- mild LVH, normal EF, ind DF, mod AS, mean gradient 18, peak 40, RAMOS 1.5 cmsq  6. JUN 2022- NST - normal perfusion but EF 15%                 Past Medical History, Past Surgical History, Family history, Social History, and Medications were all reviewed with the patient today and updated as necessary.      Home Medications           Prior to Admission medications    Medication Sig Start Date End Date Taking? Authorizing Provider   nitroGLYCERIN (NITROSTAT) 0.4 MG SL tablet Place 1 tablet under the tongue every 5 minutes as needed for Chest pain up to max of 3 total doses.  If no relief after 1 dose, call 911. 6/13/22   Yes Drew Chacon MD   amLODIPine (NORVASC) 5 MG tablet Take 5 mg by mouth daily 3/9/20   Yes Ar Automatic Reconciliation   apixaban (ELIQUIS) 5 MG TABS tablet Take 5 mg by mouth 2 times daily 3/9/20   Yes Ar Automatic Reconciliation   aspirin 81 MG chewable tablet Take 81 mg by mouth 6/30/12   Yes Ar Automatic Reconciliation   atorvastatin (LIPITOR) 80 MG tablet TAKE 1 TABLET BY MOUTH EVERY DAY 8/14/19   Yes Ar Automatic Reconciliation   carvedilol (COREG) 12.5 MG tablet Take 12.5 mg by mouth 2 times daily (with meals) 3/9/20   Yes Ar Automatic Reconciliation   dapagliflozin (FARXIGA) 10 MG tablet Take 10 mg by mouth daily 3/9/20   Yes Ar Automatic Reconciliation   irbesartan (AVAPRO) 300 MG tablet Take 300 mg by mouth daily 3/9/20   Yes Ar Automatic Reconciliation   metFORMIN (GLUCOPHAGE) 1000 MG tablet Take 1,000 mg by mouth 2 times daily (with meals) 4/16/19   Yes Ar Automatic Reconciliation         No Known Allergies  Past Medical History        Past Medical History:   Diagnosis Date    Atrial fibrillation ([address] 9/15/2013    Benign neoplasm of pineal gland ([facility] 9/15/2013    CAD (coronary artery disease)       stent x 1     [facility] 6/26/2012    Essential hypertension, benign 9/15/2013    HLD (hyperlipidemia)       managed with medication     HTN (hypertension)       managed with medication     Hypercholesterolemia      Hypertension 7/12/2013    Irregular heart beat       cardiac ablation corrected    Obesity       BMI 37.5    Other and unspecified hyperlipidemia 9/15/2013    Other psoriasis 9/15/2013    Peripheral vascular disease, unspecified [facility] 9/15/2013    Psoriasis      [facility] 2012     insulin reliant/ Avg FBS 85-90/ no S/S of low BS     Unspecified sleep apnea       no tx at this time.          Past Surgical History         Past Surgical History:   Procedure Laterality Date    ABLATION OF DYSRHYTHMIC FOCUS        CAROTID ENDARTERECTOMY Right 07/2013    LEFT HEART CATH,PERCUTANEOUS   6/27/2012     Xience to mid LAD         Family History         Family History   Problem Relation Age of Onset    Diabetes Mother      Cancer Father      Heart Disease Mother      Hypertension Mother           Social History           Tobacco Use    Smoking status: Never Smoker    Smokeless tobacco: Never Used   Substance Use Topics    Alcohol use: Yes         ROS:     Review of Systems   Constitutional: Positive for diaphoresis and malaise/fatigue. Respiratory: Positive for shortness of breath.     Gastrointestinal: Positive for bloating.            PHYSICAL EXAM:   BP (!) 138/100   Pulse (!) 111   Ht 5' 9\" (1.753 m)   Wt 260 lb 8 oz (118.2 kg)   BMI 38.47 kg/m²           Wt Readings from Last 3 Encounters:   07/08/22 260 lb 8 oz (118.2 kg)   07/08/22 265 lb (120.2 kg)   06/23/22 265 lb (120.2 kg)          BP Readings from Last 3 Encounters:   07/08/22 (!) 138/100   06/23/22 (!) 137/93   06/13/22 130/88          Pulse Readings from Last 3 Encounters:   07/08/22 (!) 111   06/23/22 (!) 110   06/13/22 (!) 109               Physical Exam  Constitutional:       Appearance: Normal appearance. HENT:      Head: Normocephalic and atraumatic. Eyes:      Conjunctiva/sclera: Conjunctivae normal.   Neck:      Vascular: JVD present. No carotid bruit. Cardiovascular:      Rate and Rhythm: Tachycardia present. Rhythm irregular. Heart sounds: Murmur heard. Harsh mid to late systolic murmur is present with a grade of 3/6 at the upper right sternal border radiating to the neck. No friction rub. No gallop. Pulmonary:      Effort: Respiratory distress present. Breath sounds: No wheezing or rales. Abdominal:      General: Abdomen is flat. There is distension. Palpations: Abdomen is soft. Tenderness: There is no abdominal tenderness. Musculoskeletal:         General: No swelling. Cervical back: Neck supple. Skin:     General: Skin is warm and dry. Neurological:      General: No focal deficit present. Mental Status: He is alert.    Psychiatric:         Mood and Affect: Mood normal.         Behavior: Behavior normal.            Medical problems and test results were reviewed with the patient today.      DATA REVIEW                 Lab Results   Component Value Date     MCV 88.7 03/09/2020      LIPID PANEL            Lab Results   Component Value Date     CHOL 150 03/09/2020            Lab Results   Component Value Date     TRIG 147 03/09/2020            Lab Results   Component Value Date     HDL 28 (L) 03/09/2020            Lab Results   Component Value Date     LDLCALC 93 03/09/2020            Lab Results   Component Value Date     LABVLDL 29 03/09/2020      No results found for: CHOLHDLRATIO     BMP        Lab Results   Component Value Date/Time      03/09/2020 11:24 AM     K 4.8 03/09/2020 11:24 AM      03/09/2020 11:24 AM     CO2 20 03/09/2020 11:24 AM     BUN 16 03/09/2020 11:24 AM     CREATININE 0.73 03/09/2020 11:24 AM     GLUCOSE 142 03/09/2020 11:24 AM     CALCIUM 9.4 03/09/2020 11:24 AM            EKG     Suspect atypical atrial flutter with RVR  LBBB     CXR/IMAGING           DEVICE INTERROGATION           OUTSIDE RECORDS REVIEW     Records from outside providers have been reviewed and summarized as noted in the HPI, past history and data review sections of this note, and reviewed with patient. .         ASSESSMENT and PLAN     Ebonie Kaur was seen today for shortness of breath and abdominal pain.     Diagnoses and all orders for this visit:     Acute HFrEF (heart failure with reduced ejection fraction) ([facility])     Atypical atrial flutter ([facility])  -     EKG 12 Lead     Rheumatic aortic stenosis     LBBB (left bundle branch block)     Coronary artery disease involving native coronary artery of native heart with other form of angina pectoris ([facility])              IMPRESSION:     NYHA IV heart failure with ascites and pleural effusions suspected by limited echo images, longstanding LBBB, established coronary disease, aortic stenosis and possible recurrent atypical atrial flutter with RVR.      Requires urgent hospital admission for diuresis, r/o MI, rate control. Echo when able to lie flat enough for images. Pending course may need urgent cath. (no CP but florid heart failure). Given 4 ASA here to chew, supplemental oxygen, starting IV and EMS has been summoned. Dr Jaquelin Tate aware.             Return for EVELINA Ignacio 8 you for allowing me to participate in this patient's care.   Please call or contact me if there are any questions or concerns regarding the above.

## 2022-07-08 NOTE — Clinical Note
TRANSFER - OUT REPORT:     Verbal report given to: 3rd floorrn. Report consisted of patient's Situation, Background, Assessment and   Recommendations(SBAR). Opportunity for questions and clarification was provided.

## 2022-07-08 NOTE — ED TRIAGE NOTES
Patient arrives via EMS from PCP. Patient c/o BLE swelling, abdominal swelling and SOB. EMS placed patient on cpap for tachypnea and labored WOB. 1mg of ativan IV given to tolerate cpap at 1636. Given 1inch nitro paste 1653. Patient A+Ox4. Vitals: /96, HR 90, RR 20, O2 sat 98% on cpap. No BGL or oral temp obtained r/t patient condition per EMS.

## 2022-07-08 NOTE — PROGRESS NOTES
Patient placed on BiPAP. Monitoring in place. Alarms set and audible. Patient is awake and alert, and tolerating BiPAP well.

## 2022-07-08 NOTE — Clinical Note
TRANSFER - OUT REPORT:     Verbal report given to: Tele. Report consisted of patient's Situation, Background, Assessment and   Recommendations(SBAR). Opportunity for questions and clarification was provided. Patient transported with a Registered Nurse and [address] [place] [address] / [facility] [employer].

## 2022-07-08 NOTE — ED PROVIDER NOTES
[employer] Emergency Department Provider Note                   PCP:                Saleem Singh MD               Age: 73 y.o. Sex: male     No diagnosis found. DISPOSITION          MDM  Number of Diagnoses or Management Options  Diagnosis management comments: Cardiology aware of patient, discussed patient EKG with on-call cardiologist who agrees this EKG likely represents findings of left bundle branch block and developing heart failure only. No indication for STEMI. Compared this tracing to previous tracings and it appears fairly consistent with those tracings on file. Will leave BiPAP in place for now, obtain labs and x-ray. Cardiology apparently is expecting patient and will consult for admission. Amount and/or Complexity of Data Reviewed  Clinical lab tests: ordered and reviewed  Tests in the radiology section of CPT®: reviewed and ordered  Tests in the medicine section of CPT®: ordered and reviewed  Review and summarize past medical records: yes  Discuss the patient with other providers: yes  Independent visualization of images, tracings, or specimens: yes    Risk of Complications, Morbidity, and/or Mortality  Presenting problems: high  Diagnostic procedures: high  Management options: high    Patient Progress  Patient progress: stable       Orders Placed This Encounter   Procedures    XR CHEST PORTABLE    CBC with Auto Differential    Comprehensive Metabolic Panel    Magnesium    Troponin    proBNP, N-TERMINAL    Cardiac Monitor - ED Only    Continuous Pulse Oximetry    EKG 12 Lead    Saline lock IV        Roma Sultana is a 73 y.o. male who presents to the Emergency Department with chief complaint of  No chief complaint on file. 72-year-old male patient presenting via EMS from outpatient cardiology office where he is attempting to undergo echocardiography presents to this department with reports of respiratory distress.   Patient apparently became very dyspneic and tachypneic when attempting to undergo echocardiography. Patient states his symptoms started just prior to this exam but significantly worsened during the procedure. EMS was called and patient was transported to this department. EMS states that patient was very tachypneic initially with sats in the low 90s. Over concern for work of breathing, patient was eventually placed on CPAP. He did require a milligram of Ativan secondary to anxiety from this mask. An inch of Nitropaste was placed in route secondary to concern for heart failure and slight hypertension. At the time of my evaluation on arrival, patient reports improvement in his symptoms. He denies chest pain pressure or tightness. He states he is able to normally sleep lying down in bed but occasionally does prop himself up on pillows. He reports no significant cough or congestion denies fever or chills. EMS reports significant pitting edema in the lower extremities. Review of Systems   Constitutional: Negative for chills and fatigue. HENT: Negative for congestion. Eyes: Negative for visual disturbance. Respiratory: Positive for shortness of breath. Negative for cough, chest tightness and wheezing. Cardiovascular: Positive for leg swelling. Negative for chest pain. Gastrointestinal: Negative for abdominal distention, abdominal pain, constipation, nausea and vomiting. Genitourinary: Negative for difficulty urinating, dysuria, flank pain and hematuria. Musculoskeletal: Negative for back pain, neck pain and neck stiffness. Skin: Negative for color change. Neurological: Negative for dizziness, light-headedness, numbness and headaches. All other systems reviewed and are negative.       Past Medical History:   Diagnosis Date    Atrial fibrillation ([facility] 9/15/2013    Benign neoplasm of pineal gland ([facility] 9/15/2013    CAD (coronary artery disease)     stent x 1     DM (diabetes mellitus) ([facility] 6/26/2012    Essential hypertension, benign 9/15/2013    HLD (hyperlipidemia)     managed with medication     HTN (hypertension)     managed with medication     Hypercholesterolemia     Hypertension 7/12/2013    Irregular heart beat     cardiac ablation corrected    Obesity     BMI 37.5    Other and unspecified hyperlipidemia 9/15/2013    Other psoriasis 9/15/2013    Peripheral vascular disease, unspecified ([facility] 9/15/2013    Psoriasis     Type 2 diabetes mellitus ([facility] 2012    insulin reliant/ Avg FBS 85-90/ no S/S of low BS     Unspecified sleep apnea     no tx at this time. Past Surgical History:   Procedure Laterality Date    ABLATION OF DYSRHYTHMIC FOCUS      CAROTID ENDARTERECTOMY Right 07/2013    LEFT HEART CATH,PERCUTANEOUS  6/27/2012    Xience to mid LAD        Family History   Problem Relation Age of Onset    Diabetes Mother     Cancer Father     Heart Disease Mother     Hypertension Mother            Social Connections:     Frequency of Communication with Friends and Family: Not on file    Frequency of Social Gatherings with Friends and Family: Not on file    Attends Jew Services: Not on file    Active Member of Clubs or Organizations: Not on file    Attends Club or Organization Meetings: Not on file    Marital Status: Not on file        No Known Allergies     Vitals signs and nursing note reviewed. Patient Vitals for the past 4 hrs:   Pulse Resp BP SpO2   07/08/22 1724 (!) 109 20 (!) 140/119 92 %   07/08/22 1721 (!) 110 26 (!) 128/104 98 %          Physical Exam  Vitals and nursing note reviewed. Constitutional:       General: He is not in acute distress. Appearance: Normal appearance. He is normal weight. He is not ill-appearing or toxic-appearing. Comments: Obese though overall generally well-appearing male patient, CPAP in place, alert and oriented x4. Mild acute distress, speaks in clear, fluid sentences. HENT:      Head: Normocephalic and atraumatic.       Right Ear: External ear normal.      Left Ear: External ear normal.      Nose: Nose normal.      Mouth/Throat:      Mouth: Mucous membranes are moist.   Eyes:      General: No scleral icterus. Right eye: No discharge. Left eye: No discharge. Extraocular Movements: Extraocular movements intact. Cardiovascular:      Rate and Rhythm: Normal rate and regular rhythm. Pulses: Normal pulses. Heart sounds: Normal heart sounds. Pulmonary:      Effort: Pulmonary effort is normal. No tachypnea, bradypnea, accessory muscle usage, prolonged expiration or respiratory distress. Breath sounds: Decreased breath sounds present. No wheezing, rhonchi or rales. Comments: Diminished throughout. No significant tachypnea or accessory muscle use. Abdominal:      General: Abdomen is flat. There is no distension. Palpations: There is no mass. Tenderness: There is no abdominal tenderness. There is no right CVA tenderness, left CVA tenderness, guarding or rebound. Hernia: No hernia is present. Comments: Obese, no focal findings. Musculoskeletal:         General: No swelling, tenderness or deformity. Normal range of motion. Cervical back: Normal range of motion. Comments: Bilateral lower extremity edema, 3+ pitting in nature. Skin:     General: Skin is warm. Capillary Refill: Capillary refill takes less than 2 seconds. Neurological:      General: No focal deficit present. Mental Status: He is alert.    Psychiatric:         Mood and Affect: Mood normal.          Procedures      Labs Reviewed   CBC WITH AUTO DIFFERENTIAL   COMPREHENSIVE METABOLIC PANEL   MAGNESIUM   TROPONIN   PROBNP, N-TERMINAL        XR CHEST PORTABLE    (Results Pending)                    ED Course as of 07/08/22 1736 Fri Jul 08, 2022 1731 EKG interpretation: Sinus tachycardia, rate of 110, normal axis, left bundle branch block, some elevation in the ST segment noted in the inferior leads, consistent with previous tracing on file. [BR]      ED Course User Index  [BR] Alejandro Ayala DO        Voice dictation software was used during the making of this note. This software is not perfect and grammatical and other typographical errors may be present. This note has not been completely proofread for errors.      Alejandro Ayala DO  07/08/22 [id number]

## 2022-07-08 NOTE — Clinical Note
TRANSFER - IN REPORT:     Verbal report received from: ER. Report consisted of patient's Situation, Background, Assessment and   Recommendations(SBAR). Opportunity for questions and clarification was provided. Assessment completed upon patient's arrival to unit and care assumed. Patient transported with a Registered Nurse and [address] [place] [address] / [facility] [employer].

## 2022-07-08 NOTE — PROGRESS NOTES
atrial fibrillation.    4. History of right carotid endarterectomy. 5.         Aortic stenosis:                  2018- mild LVH, normal EF, ind DF, mod AS, mean gradient 18, peak 40, RAMOS 1.5 cmsq  6. JUN 2022- NST - normal perfusion but EF 15%            Past Medical History, Past Surgical History, Family history, Social History, and Medications were all reviewed with the patient today and updated as necessary. Prior to Admission medications    Medication Sig Start Date End Date Taking? Authorizing Provider   nitroGLYCERIN (NITROSTAT) 0.4 MG SL tablet Place 1 tablet under the tongue every 5 minutes as needed for Chest pain up to max of 3 total doses.  If no relief after 1 dose, call 911. 6/13/22  Yes Valentino Pimenta, MD   amLODIPine (NORVASC) 5 MG tablet Take 5 mg by mouth daily 3/9/20  Yes Ar Automatic Reconciliation   apixaban (ELIQUIS) 5 MG TABS tablet Take 5 mg by mouth 2 times daily 3/9/20  Yes Ar Automatic Reconciliation   aspirin 81 MG chewable tablet Take 81 mg by mouth 6/30/12  Yes Ar Automatic Reconciliation   atorvastatin (LIPITOR) 80 MG tablet TAKE 1 TABLET BY MOUTH EVERY DAY 8/14/19  Yes Ar Automatic Reconciliation   carvedilol (COREG) 12.5 MG tablet Take 12.5 mg by mouth 2 times daily (with meals) 3/9/20  Yes Ar Automatic Reconciliation   dapagliflozin (FARXIGA) 10 MG tablet Take 10 mg by mouth daily 3/9/20  Yes Ar Automatic Reconciliation   irbesartan (AVAPRO) 300 MG tablet Take 300 mg by mouth daily 3/9/20  Yes Ar Automatic Reconciliation   metFORMIN (GLUCOPHAGE) 1000 MG tablet Take 1,000 mg by mouth 2 times daily (with meals) 4/16/19  Yes Ar Automatic Reconciliation     No Known Allergies  Past Medical History:   Diagnosis Date    Atrial fibrillation ([facility] 9/15/2013    Benign neoplasm of pineal gland ([facility] 9/15/2013    CAD (coronary artery disease)     stent x 1     DM (diabetes mellitus) ([facility] 6/26/2012    Essential hypertension, benign 9/15/2013    HLD (hyperlipidemia) managed with medication     HTN (hypertension)     managed with medication     Hypercholesterolemia     Hypertension 7/12/2013    Irregular heart beat     cardiac ablation corrected    Obesity     BMI 37.5    Other and unspecified hyperlipidemia 9/15/2013    Other psoriasis 9/15/2013    Peripheral vascular disease, unspecified ([facility] 9/15/2013    Psoriasis     Type 2 diabetes mellitus ([facility] 2012    insulin reliant/ Avg FBS 85-90/ no S/S of low BS     Unspecified sleep apnea     no tx at this time. Past Surgical History:   Procedure Laterality Date    ABLATION OF DYSRHYTHMIC FOCUS      CAROTID ENDARTERECTOMY Right 07/2013    LEFT HEART CATH,PERCUTANEOUS  6/27/2012    Xience to mid LAD     Family History   Problem Relation Age of Onset    Diabetes Mother     Cancer Father     Heart Disease Mother     Hypertension Mother      Social History     Tobacco Use    Smoking status: Never Smoker    Smokeless tobacco: Never Used   Substance Use Topics    Alcohol use: Yes       ROS:    Review of Systems   Constitutional: Positive for diaphoresis and malaise/fatigue. Respiratory: Positive for shortness of breath. Gastrointestinal: Positive for bloating. PHYSICAL EXAM:   BP (!) 138/100   Pulse (!) 111   Ht 5' 9\" (1.753 m)   Wt 260 lb 8 oz (118.2 kg)   BMI 38.47 kg/m²      Wt Readings from Last 3 Encounters:   07/08/22 260 lb 8 oz (118.2 kg)   07/08/22 265 lb (120.2 kg)   06/23/22 265 lb (120.2 kg)     BP Readings from Last 3 Encounters:   07/08/22 (!) 138/100   06/23/22 (!) 137/93   06/13/22 130/88     Pulse Readings from Last 3 Encounters:   07/08/22 (!) 111   06/23/22 (!) 110   06/13/22 (!) 109           Physical Exam  Constitutional:       Appearance: Normal appearance. HENT:      Head: Normocephalic and atraumatic. Eyes:      Conjunctiva/sclera: Conjunctivae normal.   Neck:      Vascular: JVD present. No carotid bruit. Cardiovascular:      Rate and Rhythm: Tachycardia present. today for shortness of breath and abdominal pain. Diagnoses and all orders for this visit:    Acute HFrEF (heart failure with reduced ejection fraction) (HCC)    Atypical atrial flutter (HCC)  -     EKG 12 Lead    Rheumatic aortic stenosis    LBBB (left bundle branch block)    Coronary artery disease involving native coronary artery of native heart with other form of angina pectoris (HCC)          IMPRESSION:    NYHA IV heart failure with ascites and pleural effusions suspected by limited echo images, longstanding LBBB, established coronary disease, aortic stenosis and possible recurrent atypical atrial flutter with RVR. Requires urgent hospital admission for diuresis, r/o MI, rate control. Echo when able to lie flat enough for images. Pending course may need urgent cath. (no CP but florid heart failure). Given 4 ASA here to chew, supplemental oxygen, starting IV and EMS has been summoned. Dr Sonia Best aware. Return for 4 [facility] you for allowing me to participate in this patient's care. Please call or contact me if there are any questions or concerns regarding the above.       Florence Shultz MD  07/08/22  4:11 PM

## 2022-07-09 ENCOUNTER — APPOINTMENT (OUTPATIENT)
Dept: NON INVASIVE DIAGNOSTICS | Age: 72
DRG: 273 | End: 2022-07-09
Payer: MEDICARE

## 2022-07-09 LAB
25(OH)D3 SERPL-MCNC: 21.2 NG/ML (ref 30–100)
ANION GAP SERPL CALC-SCNC: 16 MMOL/L (ref 7–16)
BUN SERPL-MCNC: 17 MG/DL (ref 8–23)
CALCIUM SERPL-MCNC: 8.2 MG/DL (ref 8.3–10.4)
CHLORIDE SERPL-SCNC: 109 MMOL/L (ref 98–107)
CHOLEST SERPL-MCNC: 99 MG/DL
CO2 SERPL-SCNC: 21 MMOL/L (ref 21–32)
CREAT SERPL-MCNC: 0.5 MG/DL (ref 0.8–1.5)
ECHO AO ASC DIAM: 3.1 CM
ECHO AO ASCENDING AORTA INDEX: 1.34 CM/M2
ECHO AO ROOT DIAM: 3.2 CM
ECHO AO ROOT INDEX: 1.39 CM/M2
ECHO AR MAX VEL PISA: 3.5 M/S
ECHO AV AREA PEAK VELOCITY: 1 CM2
ECHO AV AREA VTI: 0.9 CM2
ECHO AV AREA/BSA PEAK VELOCITY: 0.4 CM2/M2
ECHO AV AREA/BSA VTI: 0.4 CM2/M2
ECHO AV MEAN GRADIENT: 28 MMHG
ECHO AV MEAN GRADIENT: 28 MMHG
ECHO AV MEAN VELOCITY: 2.5 M/S
ECHO AV PEAK GRADIENT: 47 MMHG
ECHO AV PEAK VELOCITY: 3.4 M/S
ECHO AV REGURGITANT PHT: 518 MS
ECHO AV VELOCITY RATIO: 0.29
ECHO AV VTI: 76.6 CM
ECHO BSA: 2.4 M2
ECHO BSA: 2.4 M2
ECHO EST RA PRESSURE: 15 MMHG
ECHO IVC PROX: 2.6 CM
ECHO LA AREA 2C: 25.3 CM2
ECHO LA AREA 4C: 22 CM2
ECHO LA MAJOR AXIS: 7.1 CM
ECHO LA MINOR AXIS: 6.4 CM
ECHO LA VOL BP: 70 ML (ref 18–58)
ECHO LA VOL/BSA BIPLANE: 30 ML/M2 (ref 16–34)
ECHO LV E' LATERAL VELOCITY: 5 CM/S
ECHO LV E' SEPTAL VELOCITY: 4 CM/S
ECHO LV EDV A2C: 152 ML
ECHO LV EDV A4C: 147 ML
ECHO LV EDV INDEX A4C: 64 ML/M2
ECHO LV EDV NDEX A2C: 66 ML/M2
ECHO LV EJECTION FRACTION A2C: 34 %
ECHO LV EJECTION FRACTION A4C: 32 %
ECHO LV EJECTION FRACTION BIPLANE: 34 % (ref 55–100)
ECHO LV ESV A2C: 100 ML
ECHO LV ESV A4C: 100 ML
ECHO LV ESV INDEX A2C: 43 ML/M2
ECHO LV ESV INDEX A4C: 43 ML/M2
ECHO LV FRACTIONAL SHORTENING: 15 % (ref 28–44)
ECHO LV INTERNAL DIMENSION DIASTOLE INDEX: 2.34 CM/M2
ECHO LV INTERNAL DIMENSION DIASTOLIC: 5.4 CM (ref 4.2–5.9)
ECHO LV INTERNAL DIMENSION SYSTOLIC INDEX: 1.99 CM/M2
ECHO LV INTERNAL DIMENSION SYSTOLIC: 4.6 CM
ECHO LV IVSD: 1.2 CM (ref 0.6–1)
ECHO LV MASS 2D: 264.4 G (ref 88–224)
ECHO LV MASS INDEX 2D: 114.5 G/M2 (ref 49–115)
ECHO LV POSTERIOR WALL DIASTOLIC: 1.2 CM (ref 0.6–1)
ECHO LV RELATIVE WALL THICKNESS RATIO: 0.44
ECHO LVOT AREA: 3.5 CM2
ECHO LVOT AV VTI INDEX: 0.25
ECHO LVOT DIAM: 2.1 CM
ECHO LVOT MEAN GRADIENT: 2 MMHG
ECHO LVOT PEAK GRADIENT: 4 MMHG
ECHO LVOT PEAK VELOCITY: 1 M/S
ECHO LVOT STROKE VOLUME INDEX: 29.1 ML/M2
ECHO LVOT SV: 67.2 ML
ECHO LVOT VTI: 19.4 CM
ECHO MV AREA VTI: 2.4 CM2
ECHO MV E DECELERATION TIME (DT): 171 MS
ECHO MV E VELOCITY: 1.47 M/S
ECHO MV E/E' LATERAL: 29.4
ECHO MV E/E' RATIO (AVERAGED): 33.08
ECHO MV E/E' SEPTAL: 36.75
ECHO MV EROA PISA: 16.3 CM2
ECHO MV LVOT VTI INDEX: 1.43
ECHO MV MAX VELOCITY: 1.6 M/S
ECHO MV MEAN GRADIENT: 4 MMHG
ECHO MV MEAN VELOCITY: 0.9 M/S
ECHO MV PEAK GRADIENT: 11 MMHG
ECHO MV REGURGITANT ALIASING (NYQUIST) VELOCITY: 56 CM/S
ECHO MV REGURGITANT PEAK GRADIENT: 117 MMHG
ECHO MV REGURGITANT PEAK VELOCITY: 5.4 M/S
ECHO MV REGURGITANT RADIUS PISA: 0.5 CM
ECHO MV REGURGITANT VOLUME PISA: 2214.28 ML
ECHO MV REGURGITANT VTIA: 136 CM
ECHO MV VTI: 27.7 CM
ECHO RIGHT VENTRICULAR SYSTOLIC PRESSURE (RVSP): 42 MMHG
ECHO RV BASAL DIMENSION: 4 CM
ECHO RV TAPSE: 1.9 CM (ref 1.7–?)
ECHO TV REGURGITANT MAX VELOCITY: 2.6 M/S
ECHO TV REGURGITANT PEAK GRADIENT: 27 MMHG
EST. AVERAGE GLUCOSE BLD GHB EST-MCNC: 146 MG/DL
EST. AVERAGE GLUCOSE BLD GHB EST-MCNC: 154 MG/DL
GLUCOSE BLD STRIP.AUTO-MCNC: 113 MG/DL (ref 65–100)
GLUCOSE BLD STRIP.AUTO-MCNC: 117 MG/DL (ref 65–100)
GLUCOSE BLD STRIP.AUTO-MCNC: 147 MG/DL (ref 65–100)
GLUCOSE SERPL-MCNC: 122 MG/DL (ref 65–100)
HBA1C MFR BLD: 6.7 % (ref 4.8–5.6)
HBA1C MFR BLD: 7 % (ref 4.8–5.6)
HDLC SERPL-MCNC: 29 MG/DL (ref 40–60)
HDLC SERPL: 3.4 {RATIO}
LDLC SERPL CALC-MCNC: 51.6 MG/DL
MAGNESIUM SERPL-MCNC: 1.8 MG/DL (ref 1.8–2.4)
NT PRO BNP: 909 PG/ML (ref 5–125)
POTASSIUM SERPL-SCNC: 4 MMOL/L (ref 3.5–5.1)
SERVICE CMNT-IMP: ABNORMAL
SODIUM SERPL-SCNC: 146 MMOL/L (ref 136–145)
T4 SERPL-MCNC: 10.2 UG/DL (ref 4.8–13.9)
TRIGL SERPL-MCNC: 92 MG/DL (ref 35–150)
TROPONIN I SERPL HS-MCNC: 27.3 PG/ML (ref 0–14)
TSH, 3RD GENERATION: 1.25 UIU/ML (ref 0.36–3.74)
VLDLC SERPL CALC-MCNC: 18.4 MG/DL (ref 6–23)

## 2022-07-09 PROCEDURE — 2580000003 HC RX 258: Performed by: STUDENT IN AN ORGANIZED HEALTH CARE EDUCATION/TRAINING PROGRAM

## 2022-07-09 PROCEDURE — C8929 TTE W OR WO FOL WCON,DOPPLER: HCPCS

## 2022-07-09 PROCEDURE — 2700000000 HC OXYGEN THERAPY PER DAY

## 2022-07-09 PROCEDURE — 6360000004 HC RX CONTRAST MEDICATION: Performed by: INTERNAL MEDICINE

## 2022-07-09 PROCEDURE — 93458 L HRT ARTERY/VENTRICLE ANGIO: CPT | Performed by: INTERNAL MEDICINE

## 2022-07-09 PROCEDURE — 6360000002 HC RX W HCPCS: Performed by: INTERNAL MEDICINE

## 2022-07-09 PROCEDURE — 83880 ASSAY OF NATRIURETIC PEPTIDE: CPT

## 2022-07-09 PROCEDURE — 6360000002 HC RX W HCPCS

## 2022-07-09 PROCEDURE — 80048 BASIC METABOLIC PNL TOTAL CA: CPT

## 2022-07-09 PROCEDURE — 84436 ASSAY OF TOTAL THYROXINE: CPT

## 2022-07-09 PROCEDURE — 82306 VITAMIN D 25 HYDROXY: CPT

## 2022-07-09 PROCEDURE — 6370000000 HC RX 637 (ALT 250 FOR IP): Performed by: INTERNAL MEDICINE

## 2022-07-09 PROCEDURE — 6370000000 HC RX 637 (ALT 250 FOR IP): Performed by: NURSE PRACTITIONER

## 2022-07-09 PROCEDURE — 99152 MOD SED SAME PHYS/QHP 5/>YRS: CPT | Performed by: INTERNAL MEDICINE

## 2022-07-09 PROCEDURE — 93452 LEFT HRT CATH W/VENTRCLGRPHY: CPT | Performed by: INTERNAL MEDICINE

## 2022-07-09 PROCEDURE — 82962 GLUCOSE BLOOD TEST: CPT

## 2022-07-09 PROCEDURE — 84484 ASSAY OF TROPONIN QUANT: CPT

## 2022-07-09 PROCEDURE — 93306 TTE W/DOPPLER COMPLETE: CPT | Performed by: INTERNAL MEDICINE

## 2022-07-09 PROCEDURE — 2580000003 HC RX 258: Performed by: INTERNAL MEDICINE

## 2022-07-09 PROCEDURE — C1887 CATHETER, GUIDING: HCPCS | Performed by: INTERNAL MEDICINE

## 2022-07-09 PROCEDURE — 80061 LIPID PANEL: CPT

## 2022-07-09 PROCEDURE — 99233 SBSQ HOSP IP/OBS HIGH 50: CPT | Performed by: INTERNAL MEDICINE

## 2022-07-09 PROCEDURE — 36415 COLL VENOUS BLD VENIPUNCTURE: CPT

## 2022-07-09 PROCEDURE — 1100000003 HC PRIVATE W/ TELEMETRY

## 2022-07-09 PROCEDURE — 83735 ASSAY OF MAGNESIUM: CPT

## 2022-07-09 PROCEDURE — 4A023N7 MEASUREMENT OF CARDIAC SAMPLING AND PRESSURE, LEFT HEART, PERCUTANEOUS APPROACH: ICD-10-PCS | Performed by: INTERNAL MEDICINE

## 2022-07-09 PROCEDURE — 84443 ASSAY THYROID STIM HORMONE: CPT

## 2022-07-09 PROCEDURE — B2111ZZ FLUOROSCOPY OF MULTIPLE CORONARY ARTERIES USING LOW OSMOLAR CONTRAST: ICD-10-PCS | Performed by: INTERNAL MEDICINE

## 2022-07-09 PROCEDURE — 6360000002 HC RX W HCPCS: Performed by: NURSE PRACTITIONER

## 2022-07-09 PROCEDURE — 83036 HEMOGLOBIN GLYCOSYLATED A1C: CPT

## 2022-07-09 PROCEDURE — C1894 INTRO/SHEATH, NON-LASER: HCPCS | Performed by: INTERNAL MEDICINE

## 2022-07-09 PROCEDURE — B2151ZZ FLUOROSCOPY OF LEFT HEART USING LOW OSMOLAR CONTRAST: ICD-10-PCS | Performed by: INTERNAL MEDICINE

## 2022-07-09 PROCEDURE — 2500000003 HC RX 250 WO HCPCS: Performed by: INTERNAL MEDICINE

## 2022-07-09 PROCEDURE — 2709999900 HC NON-CHARGEABLE SUPPLY: Performed by: INTERNAL MEDICINE

## 2022-07-09 PROCEDURE — C1769 GUIDE WIRE: HCPCS | Performed by: INTERNAL MEDICINE

## 2022-07-09 RX ORDER — AMIODARONE HYDROCHLORIDE 200 MG/1
200 TABLET ORAL 2 TIMES DAILY
Status: DISCONTINUED | OUTPATIENT
Start: 2022-07-09 | End: 2022-07-11

## 2022-07-09 RX ORDER — LIDOCAINE HYDROCHLORIDE 10 MG/ML
INJECTION, SOLUTION INFILTRATION; PERINEURAL PRN
Status: DISCONTINUED | OUTPATIENT
Start: 2022-07-09 | End: 2022-07-09 | Stop reason: HOSPADM

## 2022-07-09 RX ORDER — SPIRONOLACTONE 25 MG/1
25 TABLET ORAL DAILY
Status: DISCONTINUED | OUTPATIENT
Start: 2022-07-09 | End: 2022-07-12

## 2022-07-09 RX ORDER — HEPARIN SODIUM 200 [USP'U]/100ML
INJECTION, SOLUTION INTRAVENOUS CONTINUOUS PRN
Status: COMPLETED | OUTPATIENT
Start: 2022-07-09 | End: 2022-07-09

## 2022-07-09 RX ADMIN — APIXABAN 5 MG: 5 TABLET, FILM COATED ORAL at 21:04

## 2022-07-09 RX ADMIN — SODIUM CHLORIDE, PRESERVATIVE FREE 5 ML: 5 INJECTION INTRAVENOUS at 21:06

## 2022-07-09 RX ADMIN — CARVEDILOL 12.5 MG: 25 TABLET, FILM COATED ORAL at 00:19

## 2022-07-09 RX ADMIN — ATORVASTATIN CALCIUM 80 MG: 80 TABLET, FILM COATED ORAL at 21:04

## 2022-07-09 RX ADMIN — SACUBITRIL AND VALSARTAN 1 TABLET: 24; 26 TABLET, FILM COATED ORAL at 21:04

## 2022-07-09 RX ADMIN — ASPIRIN 81 MG: 81 TABLET, CHEWABLE ORAL at 08:32

## 2022-07-09 RX ADMIN — SODIUM CHLORIDE, PRESERVATIVE FREE 5 ML: 5 INJECTION INTRAVENOUS at 17:56

## 2022-07-09 RX ADMIN — CARVEDILOL 12.5 MG: 25 TABLET, FILM COATED ORAL at 17:55

## 2022-07-09 RX ADMIN — AMIODARONE HYDROCHLORIDE 200 MG: 200 TABLET ORAL at 08:38

## 2022-07-09 RX ADMIN — PERFLUTREN 3 ML: 6.52 INJECTION, SUSPENSION INTRAVENOUS at 10:23

## 2022-07-09 RX ADMIN — AMIODARONE HYDROCHLORIDE 200 MG: 200 TABLET ORAL at 21:04

## 2022-07-09 RX ADMIN — FUROSEMIDE 40 MG: 10 INJECTION, SOLUTION INTRAMUSCULAR; INTRAVENOUS at 17:54

## 2022-07-09 RX ADMIN — ASPIRIN 81 MG: 81 TABLET, CHEWABLE ORAL at 00:19

## 2022-07-09 RX ADMIN — ATORVASTATIN CALCIUM 80 MG: 80 TABLET, FILM COATED ORAL at 00:19

## 2022-07-09 RX ADMIN — APIXABAN 5 MG: 5 TABLET, FILM COATED ORAL at 00:19

## 2022-07-09 RX ADMIN — CARVEDILOL 12.5 MG: 25 TABLET, FILM COATED ORAL at 08:32

## 2022-07-09 ASSESSMENT — EJECTION FRACTION: EF_VALUE: .28

## 2022-07-09 NOTE — PROGRESS NOTES
Patient has severe nonischemic dilated cardiomyopathy with ejection fraction around 15%. He has moderate diffuse nonobstructive coronary artery disease. He has at least moderate aortic valve stenosis most likely. He would benefit from aggressive guideline directed medical therapy over the next several days including diuresis.   Depending on the echo findings may need to consider when he is more stable a right left heart cath again not for coronary anatomy but for delineation of aortic valve area and that he could have low-flow low gradient aortic stenosis

## 2022-07-09 NOTE — PROGRESS NOTES
Radial compression band removed at 1255 after slowly reducing air to zero as per hospital protocol. No bleeding or hematoma noted. 2 x 2 gauze with tegaderm placed over puncture site. The affected extremity is warm and dry to the touch. Patient instructed to call if any bleeding noted on gauze. Patient verbalized understanding the nursing instructions.

## 2022-07-09 NOTE — PROGRESS NOTES
Admission skin assessment completed with Rory Sung RN and reveals the following: Patient's skin is warm, dry, flaky, pink/red. Scattered scars and abrasions present. The sacrum and heels are intact with no skin breakdown. There is a right radial cath site that is c/d/i with no hematoma.

## 2022-07-09 NOTE — PLAN OF CARE
Problem: Chronic Conditions and Co-morbidities  Goal: Patient's chronic conditions and co-morbidity symptoms are monitored and maintained or improved  Outcome: Progressing  Flowsheets (Taken 7/9/2022 1015)  Care Plan - Patient's Chronic Conditions and Co-Morbidity Symptoms are Monitored and Maintained or Improved:   Monitor and assess patient's chronic conditions and comorbid symptoms for stability, deterioration, or improvement   Collaborate with multidisciplinary team to address chronic and comorbid conditions and prevent exacerbation or deterioration   Update acute care plan with appropriate goals if chronic or comorbid symptoms are exacerbated and prevent overall improvement and discharge     Problem: Discharge Planning  Goal: Discharge to home or other facility with appropriate resources  Outcome: Progressing  Flowsheets (Taken 7/9/2022 1015)  Discharge to home or other facility with appropriate resources:   Identify barriers to discharge with patient and caregiver   Arrange for needed discharge resources and transportation as appropriate   Identify discharge learning needs (meds, wound care, etc)   Refer to discharge planning if patient needs post-hospital services based on physician order or complex needs related to functional status, cognitive ability or social support system     Problem: Safety - Adult  Goal: Free from fall injury  Outcome: Progressing

## 2022-07-09 NOTE — PROGRESS NOTES
TRANSFER - OUT REPORT:    [facility]  [facility]RA  Diagnostic medical mgmt  Versed 2 mg  Heparin 5,000 units  Band 12 ml  No bleeding/hematoma    Verbal report given to Ivanna Granado on Kj Bunn  being transferred to [facility] for routine progression of patient care       Report consisted of patient's Situation, Background, Assessment and   Recommendations(SBAR). Information from the following report(s) Nurse Handoff Report and Event Log was reviewed with the receiving nurse. Lines:   Peripheral IV 07/08/22 Distal;Right; Anterior Cephalic (Active)        Opportunity for questions and clarification was provided.

## 2022-07-09 NOTE — ED NOTES
Report from [facility] transfer of care at this time     Chelsy Medicus, [address] [facility]  07/09/22 [id number]

## 2022-07-09 NOTE — PROGRESS NOTES
am  7/9/2022 7:14 AM    Admit Date: 7/8/2022    Admit Diagnosis: Acute on chronic systolic (congestive) heart failure (HCC) [I50.23]      Subjective:    Patient : Patient admitted from the office for shortness of breath presumed fluid overload from HFrEF. He was attempting to undergo an echocardiogram but could not lay flat for this. While in the emergency room he was noted to be in stable ventricular tachycardia he received intravenous amiodarone which then broke his apparent VT to sinus rhythm with left bundle branch block    Objective:    /74   Pulse (!) 103   Temp 98.9 °F (37.2 °C) (Oral)   Resp 24   Ht 5' 9\" (1.753 m)   Wt 260 lb (117.9 kg)   SpO2 95%   BMI 38.40 kg/m²     ROS:  General ROS: negative for - chills  Hematological and Lymphatic ROS: negative for - blood clots or jaundice  Respiratory ROS: no cough, shortness of breath, or wheezing  Cardiovascular ROS: positive for - dyspnea on exertion  Gastrointestinal ROS: no abdominal pain, change in bowel habits, or black or bloody stools  Neurological ROS: no TIA or stroke symptoms    Physical Exam:      Physical Examination: General appearance - Appearance: alert, well appearing, and in no distress.    Neck/lymph - supple, no significant adenopathy  Chest/CV - clear to auscultation, no wheezes, rales or rhonchi, symmetric air entry  Heart - normal rate, regular rhythm, normal S1, S2, no murmurs, rubs, clicks or gallops  Abdomen/GI - soft, nontender, nondistended, no masses or organomegaly   Musculoskeletal - no joint tenderness, deformity or swelling  Extremities - peripheral pulses normal, no pedal edema, no clubbing or cyanosis  Skin - normal coloration and turgor, no rashes, no suspicious skin lesions noted    Current Facility-Administered Medications   Medication Dose Route Frequency    sodium chloride flush 0.9 % injection 5 mL  5 mL IntraVENous Q8H    sodium chloride flush 0.9 % injection 5 mL  5 mL IntraVENous PRN    aspirin chewable tablet 81 mg  81 mg Oral Daily    apixaban (ELIQUIS) tablet 5 mg  5 mg Oral BID    atorvastatin (LIPITOR) tablet 80 mg  80 mg Oral Nightly    losartan (COZAAR) tablet 100 mg  100 mg Oral Daily    carvedilol (COREG) tablet 12.5 mg  12.5 mg Oral BID WC    amLODIPine (NORVASC) tablet 5 mg  5 mg Oral Daily    sodium chloride flush 0.9 % injection 5-40 mL  5-40 mL IntraVENous PRN    0.9 % sodium chloride infusion   IntraVENous PRN    ondansetron (ZOFRAN-ODT) disintegrating tablet 4 mg  4 mg Oral Q8H PRN    Or    ondansetron (ZOFRAN) injection 4 mg  4 mg IntraVENous Q6H PRN    polyethylene glycol (GLYCOLAX) packet 17 g  17 g Oral Daily PRN    acetaminophen (TYLENOL) tablet 650 mg  650 mg Oral Q6H PRN    Or    acetaminophen (TYLENOL) suppository 650 mg  650 mg Rectal Q6H PRN    furosemide (LASIX) injection 40 mg  40 mg IntraVENous BID    amiodarone (CORDARONE) 450 mg in dextrose 5 % 250 mL infusion ([id number])  1 mg/min IntraVENous Continuous    insulin lispro (HUMALOG) injection vial 0-4 Units  0-4 Units SubCUTAneous TID WC    insulin lispro (HUMALOG) injection vial 0-4 Units  0-4 Units SubCUTAneous Nightly     Current Outpatient Medications   Medication Sig    nitroGLYCERIN (NITROSTAT) 0.4 MG SL tablet Place 1 tablet under the tongue every 5 minutes as needed for Chest pain up to max of 3 total doses. If no relief after 1 dose, call 911.     amLODIPine (NORVASC) 5 MG tablet Take 5 mg by mouth daily    apixaban (ELIQUIS) 5 MG TABS tablet Take 5 mg by mouth 2 times daily    aspirin 81 MG chewable tablet Take 81 mg by mouth    atorvastatin (LIPITOR) 80 MG tablet TAKE 1 TABLET BY MOUTH EVERY DAY    carvedilol (COREG) 12.5 MG tablet Take 12.5 mg by mouth 2 times daily (with meals)    dapagliflozin (FARXIGA) 10 MG tablet Take 10 mg by mouth daily    irbesartan (AVAPRO) 300 MG tablet Take 300 mg by mouth daily    metFORMIN (GLUCOPHAGE) 1000 MG tablet Take 1,000 mg by mouth 2 times daily (with meals) Data Review: data included in this note has been independently reviewed by the author     TELEMETRY: Sinus rhythm with left bundle branch block    Assessment/Plan:     Patient Active Problem List   Diagnosis    Psoriasis    Other psoriasis    Dyslipidemia    Carotid artery stenosis    Essential hypertension, benign    Atrial flutter ([address]    CAD (coronary artery disease)    Acute on chronic systolic (congestive) heart failure (HCC)     PLAN  HFrEF  Patient needs to undergo intensive medical therapy diuresis and cardiac catheterization. He also will need [facility] referral during this time while he is treated for this new onset HFrEF    Pt set up for procedure. Risks benefits and alternatives discussed. Pt agrees to proceed. Risks of bleeding infection emergent surgical procedure loss of life or limb renal failure and other known risks discussed. Pt agrees to proceed and agrees to sign consent form. Current consent form has been signed and visualized and is completed in its entirety by all involved parties. Actual scanning of the paper consent into the chart takes place at a later date and is a process that I am not involved in nor can be held responsible for.       Wilmer Barr MD

## 2022-07-09 NOTE — PROGRESS NOTES
Patient placed on Airvo 55L/70% at this time. Patient tolerated change well. EhJ2=737% after change. No acute distress noted at this time. 0815:  Patient placed on RA with RT at bedside by RN. No acute distress noted. SpO2=92%.

## 2022-07-09 NOTE — PROGRESS NOTES
TRANSFER - IN REPORT:    Verbal report received from DANIEL Laughlin on [employer] being received from [address] [place] [address] for routine progression of care. Report consisted of patients Situation, Background, Assessment and Recommendations(SBAR). Information from the following report(s) SBAR, Kardex, Procedure Summary, Intake/Output, MAR and Recent Results was reviewed. Opportunity for questions and clarification was provided. Assessment completed upon patients arrival to unit and care assumed. Patient received to room 325. Patient connected to monitor and assessment completed. Plan of care reviewed. Patient oriented to room and call light. Patient aware to use call light to communicate any chest pain or needs.

## 2022-07-09 NOTE — ED NOTES
Attempt made to obtain morning labs. Unable to stick patient. Lab called to obtain AM labs.       Fatuma Love RN  07/09/22 1946

## 2022-07-09 NOTE — ED NOTES
Patient transferred via stretcher to cath lab  With cath lab staff     Genevieve Brock RN  07/09/22 [id number]

## 2022-07-10 LAB
ANION GAP SERPL CALC-SCNC: 8 MMOL/L (ref 7–16)
BUN SERPL-MCNC: 21 MG/DL (ref 8–23)
CALCIUM SERPL-MCNC: 9.4 MG/DL (ref 8.3–10.4)
CHLORIDE SERPL-SCNC: 104 MMOL/L (ref 98–107)
CO2 SERPL-SCNC: 27 MMOL/L (ref 21–32)
CREAT SERPL-MCNC: 0.6 MG/DL (ref 0.8–1.5)
EKG ATRIAL RATE: 203 BPM
EKG DIAGNOSIS: NORMAL
EKG P AXIS: 136 DEGREES
EKG P-R INTERVAL: 56 MS
EKG Q-T INTERVAL: 430 MS
EKG QRS DURATION: 173 MS
EKG QTC CALCULATION (BAZETT): 582 MS
EKG R AXIS: -12 DEGREES
EKG T AXIS: 146 DEGREES
EKG VENTRICULAR RATE: 110 BPM
GLUCOSE BLD STRIP.AUTO-MCNC: 125 MG/DL (ref 65–100)
GLUCOSE BLD STRIP.AUTO-MCNC: 143 MG/DL (ref 65–100)
GLUCOSE BLD STRIP.AUTO-MCNC: 165 MG/DL (ref 65–100)
GLUCOSE BLD STRIP.AUTO-MCNC: 171 MG/DL (ref 65–100)
GLUCOSE SERPL-MCNC: 132 MG/DL (ref 65–100)
MAGNESIUM SERPL-MCNC: 2.2 MG/DL (ref 1.8–2.4)
POTASSIUM SERPL-SCNC: 3.8 MMOL/L (ref 3.5–5.1)
SERVICE CMNT-IMP: ABNORMAL
SODIUM SERPL-SCNC: 139 MMOL/L (ref 136–145)

## 2022-07-10 PROCEDURE — 99232 SBSQ HOSP IP/OBS MODERATE 35: CPT | Performed by: INTERNAL MEDICINE

## 2022-07-10 PROCEDURE — 80048 BASIC METABOLIC PNL TOTAL CA: CPT

## 2022-07-10 PROCEDURE — 1100000003 HC PRIVATE W/ TELEMETRY

## 2022-07-10 PROCEDURE — 82962 GLUCOSE BLOOD TEST: CPT

## 2022-07-10 PROCEDURE — 6360000002 HC RX W HCPCS: Performed by: NURSE PRACTITIONER

## 2022-07-10 PROCEDURE — 36415 COLL VENOUS BLD VENIPUNCTURE: CPT

## 2022-07-10 PROCEDURE — 6370000000 HC RX 637 (ALT 250 FOR IP): Performed by: INTERNAL MEDICINE

## 2022-07-10 PROCEDURE — 6370000000 HC RX 637 (ALT 250 FOR IP): Performed by: NURSE PRACTITIONER

## 2022-07-10 PROCEDURE — 83735 ASSAY OF MAGNESIUM: CPT

## 2022-07-10 PROCEDURE — 2580000003 HC RX 258: Performed by: STUDENT IN AN ORGANIZED HEALTH CARE EDUCATION/TRAINING PROGRAM

## 2022-07-10 RX ORDER — ERGOCALCIFEROL 1.25 MG/1
50000 CAPSULE ORAL WEEKLY
Status: DISCONTINUED | OUTPATIENT
Start: 2022-07-10 | End: 2022-07-13 | Stop reason: HOSPADM

## 2022-07-10 RX ORDER — CARVEDILOL 25 MG/1
25 TABLET ORAL 2 TIMES DAILY WITH MEALS
Status: DISCONTINUED | OUTPATIENT
Start: 2022-07-10 | End: 2022-07-11

## 2022-07-10 RX ADMIN — ATORVASTATIN CALCIUM 80 MG: 80 TABLET, FILM COATED ORAL at 20:44

## 2022-07-10 RX ADMIN — FUROSEMIDE 40 MG: 10 INJECTION, SOLUTION INTRAMUSCULAR; INTRAVENOUS at 17:36

## 2022-07-10 RX ADMIN — SACUBITRIL AND VALSARTAN 1 TABLET: 24; 26 TABLET, FILM COATED ORAL at 20:44

## 2022-07-10 RX ADMIN — SODIUM CHLORIDE, PRESERVATIVE FREE 5 ML: 5 INJECTION INTRAVENOUS at 08:51

## 2022-07-10 RX ADMIN — EMPAGLIFLOZIN 10 MG: 10 TABLET, FILM COATED ORAL at 09:46

## 2022-07-10 RX ADMIN — APIXABAN 5 MG: 5 TABLET, FILM COATED ORAL at 08:44

## 2022-07-10 RX ADMIN — ASPIRIN 81 MG: 81 TABLET, CHEWABLE ORAL at 08:44

## 2022-07-10 RX ADMIN — AMIODARONE HYDROCHLORIDE 200 MG: 200 TABLET ORAL at 20:44

## 2022-07-10 RX ADMIN — APIXABAN 5 MG: 5 TABLET, FILM COATED ORAL at 20:44

## 2022-07-10 RX ADMIN — SPIRONOLACTONE 25 MG: 25 TABLET ORAL at 08:44

## 2022-07-10 RX ADMIN — SACUBITRIL AND VALSARTAN 1 TABLET: 24; 26 TABLET, FILM COATED ORAL at 08:44

## 2022-07-10 RX ADMIN — FUROSEMIDE 40 MG: 10 INJECTION, SOLUTION INTRAMUSCULAR; INTRAVENOUS at 08:44

## 2022-07-10 RX ADMIN — AMIODARONE HYDROCHLORIDE 200 MG: 200 TABLET ORAL at 08:44

## 2022-07-10 RX ADMIN — CARVEDILOL 25 MG: 25 TABLET, FILM COATED ORAL at 08:44

## 2022-07-10 RX ADMIN — ERGOCALCIFEROL 50000 UNITS: 1.25 CAPSULE ORAL at 09:46

## 2022-07-10 RX ADMIN — CARVEDILOL 25 MG: 25 TABLET, FILM COATED ORAL at 17:35

## 2022-07-10 NOTE — PLAN OF CARE
Problem: Chronic Conditions and Co-morbidities  Goal: Patient's chronic conditions and co-morbidity symptoms are monitored and maintained or improved  Outcome: Progressing  Flowsheets (Taken 7/10/2022 0845)  Care Plan - Patient's Chronic Conditions and Co-Morbidity Symptoms are Monitored and Maintained or Improved:   Monitor and assess patient's chronic conditions and comorbid symptoms for stability, deterioration, or improvement   Collaborate with multidisciplinary team to address chronic and comorbid conditions and prevent exacerbation or deterioration   Update acute care plan with appropriate goals if chronic or comorbid symptoms are exacerbated and prevent overall improvement and discharge     Problem: Discharge Planning  Goal: Discharge to home or other facility with appropriate resources  Outcome: Progressing  Flowsheets (Taken 7/10/2022 0845)  Discharge to home or other facility with appropriate resources:   Identify barriers to discharge with patient and caregiver   Arrange for needed discharge resources and transportation as appropriate   Identify discharge learning needs (meds, wound care, etc)   Refer to discharge planning if patient needs post-hospital services based on physician order or complex needs related to functional status, cognitive ability or social support system     Problem: Safety - Adult  Goal: Free from fall injury  Outcome: Progressing  Flowsheets (Taken 7/10/2022 0845)  Free From Fall Injury: Instruct family/caregiver on patient safety     Problem: ABCDS Injury Assessment  Goal: Absence of physical injury  Outcome: Progressing

## 2022-07-10 NOTE — PROGRESS NOTES
am  7/10/2022 7:16 AM    Admit Date: 7/8/2022    Admit Diagnosis: CHF (congestive heart failure) ([facility]) [I50.9]  Respiratory distress [R06.03]  Acute on chronic systolic (congestive) heart failure (HCC) [I50.23]  Acute congestive heart failure, unspecified heart failure type ([facility] [address] [facility] [I50.9]      Subjective:    Patient : Patient admitted from the office for shortness of breath presumed fluid overload from HFrEF. He was attempting to undergo an echocardiogram but could not lay flat for this. While in the emergency room he was noted to be in stable ventricular tachycardia he received intravenous amiodarone which then broke his apparent VT to sinus rhythm with left bundle branch block    7/10/22  Heart cath and echo demonstrated severe nonischemic cardiomyopathy severely reduced ejection fraction with a EF in the 15 to 20% range. He also appears to have significant aortic stenosis. He appears to be responding reasonably well to escalating medical therapy    Objective:    /72   Pulse 84   Temp 97.7 °F (36.5 °C)   Resp 24   Ht 5' 9\" (1.753 m)   Wt 260 lb (117.9 kg)   SpO2 93%   BMI 38.40 kg/m²     ROS:  General ROS: negative for - chills  Hematological and Lymphatic ROS: negative for - blood clots or jaundice  Respiratory ROS: no cough, shortness of breath, or wheezing  Cardiovascular ROS: positive for - dyspnea on exertion  Gastrointestinal ROS: no abdominal pain, change in bowel habits, or black or bloody stools  Neurological ROS: no TIA or stroke symptoms    Physical Exam:      Physical Examination: General appearance - Appearance: alert, well appearing, and in no distress.    Neck/lymph - supple, no significant adenopathy  Chest/CV - clear to auscultation, no wheezes, rales or rhonchi, symmetric air entry  Heart - normal rate, regular rhythm, normal S1, S2, no murmurs, rubs, clicks or gallops  Abdomen/GI - soft, nontender, nondistended, no masses or organomegaly   Musculoskeletal - no joint tenderness, deformity or swelling  Extremities - peripheral pulses normal, no pedal edema, no clubbing or cyanosis  Skin - normal coloration and turgor, no rashes, no suspicious skin lesions noted    Current Facility-Administered Medications   Medication Dose Route Frequency    vitamin D (ERGOCALCIFEROL) capsule 50,000 Units  50,000 Units Oral Weekly    carvedilol (COREG) tablet 25 mg  25 mg Oral BID     sacubitril-valsartan (ENTRESTO) 24-26 MG per tablet 1 tablet  1 tablet Oral BID    spironolactone (ALDACTONE) tablet 25 mg  25 mg Oral Daily    empagliflozin (JARDIANCE) tablet 10 mg  10 mg Oral Daily    amiodarone (CORDARONE) tablet 200 mg  200 mg Oral BID    sodium chloride flush 0.9 % injection 5 mL  5 mL IntraVENous Q8H    sodium chloride flush 0.9 % injection 5 mL  5 mL IntraVENous PRN    aspirin chewable tablet 81 mg  81 mg Oral Daily    apixaban (ELIQUIS) tablet 5 mg  5 mg Oral BID    atorvastatin (LIPITOR) tablet 80 mg  80 mg Oral Nightly    sodium chloride flush 0.9 % injection 5-40 mL  5-40 mL IntraVENous PRN    0.9 % sodium chloride infusion   IntraVENous PRN    ondansetron (ZOFRAN-ODT) disintegrating tablet 4 mg  4 mg Oral Q8H PRN    Or    ondansetron (ZOFRAN) injection 4 mg  4 mg IntraVENous Q6H PRN    polyethylene glycol (GLYCOLAX) packet 17 g  17 g Oral Daily PRN    acetaminophen (TYLENOL) tablet 650 mg  650 mg Oral Q6H PRN    Or    acetaminophen (TYLENOL) suppository 650 mg  650 mg Rectal Q6H PRN    furosemide (LASIX) injection 40 mg  40 mg IntraVENous BID    insulin lispro (HUMALOG) injection vial 0-4 Units  0-4 Units SubCUTAneous TID     insulin lispro (HUMALOG) injection vial 0-4 Units  0-4 Units SubCUTAneous Nightly       Data Review: data included in this note has been independently reviewed by the author     TELEMETRY: Sinus rhythm with left bundle branch block    Assessment/Plan:     Patient Active Problem List   Diagnosis    Psoriasis    Other psoriasis    Dyslipidemia    Carotid artery stenosis    Essential hypertension, benign    Atrial flutter (HCC)    CAD (coronary artery disease)    Acute on chronic systolic (congestive) heart failure (HCC)     PLAN  HFrEF  Patient needs to undergo intensive medical therapy diuresis and cardiac catheterization. He also will need [facility] referral during this time while he is treated for this new onset HFrEF    Will continue to titrate medications for HFrEF I will stop his Norvasc and increase his Coreg to allow for better rate control of his tachycardia. This will also maximize HFrEF medications. We will need to address if we are comfortable that he does indeed have low-flow low gradient severe AS or if he will need a right heart cath with concomitant LV pressure measurements to get a cath derived aortic valve area    Patient has had no more ventricular tachycardia since admission and initiation of amiodarone therapy.   Will discuss with electrophysiology whether his current findings indicate BiV ICD placement at this point or if we need to wait the 3 months while patient gets optimized line directed medical therapy      Adriana Sommer MD

## 2022-07-11 ENCOUNTER — APPOINTMENT (OUTPATIENT)
Dept: GENERAL RADIOLOGY | Age: 72
DRG: 273 | End: 2022-07-11
Payer: MEDICARE

## 2022-07-11 ENCOUNTER — APPOINTMENT (OUTPATIENT)
Dept: CARDIAC CATH/INVASIVE PROCEDURES | Age: 72
DRG: 273 | End: 2022-07-11
Payer: MEDICARE

## 2022-07-11 ENCOUNTER — ANESTHESIA EVENT (OUTPATIENT)
Dept: CARDIAC CATH/INVASIVE PROCEDURES | Age: 72
DRG: 273 | End: 2022-07-11
Payer: MEDICARE

## 2022-07-11 ENCOUNTER — ANESTHESIA (OUTPATIENT)
Dept: CARDIAC CATH/INVASIVE PROCEDURES | Age: 72
DRG: 273 | End: 2022-07-11
Payer: MEDICARE

## 2022-07-11 LAB
ANION GAP SERPL CALC-SCNC: 9 MMOL/L (ref 7–16)
BUN SERPL-MCNC: 32 MG/DL (ref 8–23)
CALCIUM SERPL-MCNC: 9.3 MG/DL (ref 8.3–10.4)
CHLORIDE SERPL-SCNC: 104 MMOL/L (ref 98–107)
CO2 SERPL-SCNC: 27 MMOL/L (ref 21–32)
CREAT SERPL-MCNC: 0.8 MG/DL (ref 0.8–1.5)
ECHO BSA: 2.4 M2
GLUCOSE BLD STRIP.AUTO-MCNC: 108 MG/DL (ref 65–100)
GLUCOSE BLD STRIP.AUTO-MCNC: 131 MG/DL (ref 65–100)
GLUCOSE BLD STRIP.AUTO-MCNC: 156 MG/DL (ref 65–100)
GLUCOSE BLD STRIP.AUTO-MCNC: 188 MG/DL (ref 65–100)
GLUCOSE SERPL-MCNC: 137 MG/DL (ref 65–100)
MAGNESIUM SERPL-MCNC: 2.1 MG/DL (ref 1.8–2.4)
POTASSIUM SERPL-SCNC: 3.5 MMOL/L (ref 3.5–5.1)
SERVICE CMNT-IMP: ABNORMAL
SODIUM SERPL-SCNC: 140 MMOL/L (ref 136–145)

## 2022-07-11 PROCEDURE — 3700000001 HC ADD 15 MINUTES (ANESTHESIA): Performed by: INTERNAL MEDICINE

## 2022-07-11 PROCEDURE — 6360000002 HC RX W HCPCS: Performed by: NURSE ANESTHETIST, CERTIFIED REGISTERED

## 2022-07-11 PROCEDURE — 6370000000 HC RX 637 (ALT 250 FOR IP): Performed by: NURSE PRACTITIONER

## 2022-07-11 PROCEDURE — 2709999900 HC NON-CHARGEABLE SUPPLY: Performed by: INTERNAL MEDICINE

## 2022-07-11 PROCEDURE — 93325 DOPPLER ECHO COLOR FLOW MAPG: CPT

## 2022-07-11 PROCEDURE — C1760 CLOSURE DEV, VASC: HCPCS | Performed by: INTERNAL MEDICINE

## 2022-07-11 PROCEDURE — 71046 X-RAY EXAM CHEST 2 VIEWS: CPT

## 2022-07-11 PROCEDURE — 6370000000 HC RX 637 (ALT 250 FOR IP): Performed by: INTERNAL MEDICINE

## 2022-07-11 PROCEDURE — 83735 ASSAY OF MAGNESIUM: CPT

## 2022-07-11 PROCEDURE — 99223 1ST HOSP IP/OBS HIGH 75: CPT | Performed by: INTERNAL MEDICINE

## 2022-07-11 PROCEDURE — 2500000003 HC RX 250 WO HCPCS: Performed by: INTERNAL MEDICINE

## 2022-07-11 PROCEDURE — 93653 COMPRE EP EVAL TX SVT: CPT | Performed by: INTERNAL MEDICINE

## 2022-07-11 PROCEDURE — 6360000002 HC RX W HCPCS: Performed by: ANESTHESIOLOGY

## 2022-07-11 PROCEDURE — 93621 COMP EP EVL L PAC&REC C SINS: CPT | Performed by: INTERNAL MEDICINE

## 2022-07-11 PROCEDURE — 4A0234Z MEASUREMENT OF CARDIAC ELECTRICAL ACTIVITY, PERCUTANEOUS APPROACH: ICD-10-PCS | Performed by: INTERNAL MEDICINE

## 2022-07-11 PROCEDURE — 2500000003 HC RX 250 WO HCPCS: Performed by: NURSE ANESTHETIST, CERTIFIED REGISTERED

## 2022-07-11 PROCEDURE — 02K83ZZ MAP CONDUCTION MECHANISM, PERCUTANEOUS APPROACH: ICD-10-PCS | Performed by: INTERNAL MEDICINE

## 2022-07-11 PROCEDURE — 2580000003 HC RX 258: Performed by: NURSE ANESTHETIST, CERTIFIED REGISTERED

## 2022-07-11 PROCEDURE — 93320 DOPPLER ECHO COMPLETE: CPT | Performed by: INTERNAL MEDICINE

## 2022-07-11 PROCEDURE — 6360000002 HC RX W HCPCS: Performed by: INTERNAL MEDICINE

## 2022-07-11 PROCEDURE — 36415 COLL VENOUS BLD VENIPUNCTURE: CPT

## 2022-07-11 PROCEDURE — 2580000003 HC RX 258: Performed by: STUDENT IN AN ORGANIZED HEALTH CARE EDUCATION/TRAINING PROGRAM

## 2022-07-11 PROCEDURE — C1730 CATH, EP, 19 OR FEW ELECT: HCPCS | Performed by: INTERNAL MEDICINE

## 2022-07-11 PROCEDURE — 02583ZZ DESTRUCTION OF CONDUCTION MECHANISM, PERCUTANEOUS APPROACH: ICD-10-PCS | Performed by: INTERNAL MEDICINE

## 2022-07-11 PROCEDURE — 2580000003 HC RX 258: Performed by: INTERNAL MEDICINE

## 2022-07-11 PROCEDURE — 80048 BASIC METABOLIC PNL TOTAL CA: CPT

## 2022-07-11 PROCEDURE — C1732 CATH, EP, DIAG/ABL, 3D/VECT: HCPCS | Performed by: INTERNAL MEDICINE

## 2022-07-11 PROCEDURE — C1894 INTRO/SHEATH, NON-LASER: HCPCS | Performed by: INTERNAL MEDICINE

## 2022-07-11 PROCEDURE — 93613 INTRACARDIAC EPHYS 3D MAPG: CPT | Performed by: INTERNAL MEDICINE

## 2022-07-11 PROCEDURE — 3700000000 HC ANESTHESIA ATTENDED CARE: Performed by: INTERNAL MEDICINE

## 2022-07-11 PROCEDURE — 93312 ECHO TRANSESOPHAGEAL: CPT | Performed by: INTERNAL MEDICINE

## 2022-07-11 PROCEDURE — 4A023FZ MEASUREMENT OF CARDIAC RHYTHM, PERCUTANEOUS APPROACH: ICD-10-PCS | Performed by: INTERNAL MEDICINE

## 2022-07-11 PROCEDURE — 99233 SBSQ HOSP IP/OBS HIGH 50: CPT | Performed by: INTERNAL MEDICINE

## 2022-07-11 PROCEDURE — 82962 GLUCOSE BLOOD TEST: CPT

## 2022-07-11 PROCEDURE — 93325 DOPPLER ECHO COLOR FLOW MAPG: CPT | Performed by: INTERNAL MEDICINE

## 2022-07-11 PROCEDURE — B24BZZ4 ULTRASONOGRAPHY OF HEART WITH AORTA, TRANSESOPHAGEAL: ICD-10-PCS | Performed by: INTERNAL MEDICINE

## 2022-07-11 PROCEDURE — 2100000000 HC CCU R&B

## 2022-07-11 PROCEDURE — 93320 DOPPLER ECHO COMPLETE: CPT

## 2022-07-11 PROCEDURE — P9041 ALBUMIN (HUMAN),5%, 50ML: HCPCS | Performed by: ANESTHESIOLOGY

## 2022-07-11 RX ORDER — OXYCODONE HYDROCHLORIDE 5 MG/1
5 TABLET ORAL
Status: ACTIVE | OUTPATIENT
Start: 2022-07-11 | End: 2022-07-11

## 2022-07-11 RX ORDER — SODIUM CHLORIDE, SODIUM LACTATE, POTASSIUM CHLORIDE, CALCIUM CHLORIDE 600; 310; 30; 20 MG/100ML; MG/100ML; MG/100ML; MG/100ML
INJECTION, SOLUTION INTRAVENOUS CONTINUOUS PRN
Status: DISCONTINUED | OUTPATIENT
Start: 2022-07-11 | End: 2022-07-11 | Stop reason: SDUPTHER

## 2022-07-11 RX ORDER — MIDAZOLAM HYDROCHLORIDE 1 MG/ML
INJECTION INTRAMUSCULAR; INTRAVENOUS PRN
Status: DISCONTINUED | OUTPATIENT
Start: 2022-07-11 | End: 2022-07-11 | Stop reason: SDUPTHER

## 2022-07-11 RX ORDER — LIDOCAINE HYDROCHLORIDE 10 MG/ML
INJECTION, SOLUTION INFILTRATION; PERINEURAL PRN
Status: DISCONTINUED | OUTPATIENT
Start: 2022-07-11 | End: 2022-07-11 | Stop reason: HOSPADM

## 2022-07-11 RX ORDER — DEXMEDETOMIDINE HYDROCHLORIDE 4 UG/ML
INJECTION, SOLUTION INTRAVENOUS CONTINUOUS PRN
Status: DISCONTINUED | OUTPATIENT
Start: 2022-07-11 | End: 2022-07-11 | Stop reason: SDUPTHER

## 2022-07-11 RX ORDER — FENTANYL CITRATE 50 UG/ML
100 INJECTION, SOLUTION INTRAMUSCULAR; INTRAVENOUS
Status: CANCELLED | OUTPATIENT
Start: 2022-07-11 | End: 2022-07-11

## 2022-07-11 RX ORDER — PROPOFOL 10 MG/ML
INJECTION, EMULSION INTRAVENOUS PRN
Status: DISCONTINUED | OUTPATIENT
Start: 2022-07-11 | End: 2022-07-11 | Stop reason: SDUPTHER

## 2022-07-11 RX ORDER — HYDROMORPHONE HYDROCHLORIDE 2 MG/ML
0.5 INJECTION, SOLUTION INTRAMUSCULAR; INTRAVENOUS; SUBCUTANEOUS EVERY 5 MIN PRN
Status: DISCONTINUED | OUTPATIENT
Start: 2022-07-11 | End: 2022-07-12 | Stop reason: HOSPADM

## 2022-07-11 RX ORDER — SODIUM CHLORIDE, SODIUM LACTATE, POTASSIUM CHLORIDE, CALCIUM CHLORIDE 600; 310; 30; 20 MG/100ML; MG/100ML; MG/100ML; MG/100ML
100 INJECTION, SOLUTION INTRAVENOUS CONTINUOUS
Status: CANCELLED | OUTPATIENT
Start: 2022-07-11

## 2022-07-11 RX ORDER — ALBUMIN, HUMAN INJ 5% 5 %
12.5 SOLUTION INTRAVENOUS ONCE
Status: COMPLETED | OUTPATIENT
Start: 2022-07-11 | End: 2022-07-11

## 2022-07-11 RX ORDER — LIDOCAINE HYDROCHLORIDE 10 MG/ML
1 INJECTION, SOLUTION EPIDURAL; INFILTRATION; INTRACAUDAL; PERINEURAL
Status: CANCELLED | OUTPATIENT
Start: 2022-07-11 | End: 2022-07-11

## 2022-07-11 RX ORDER — PROCHLORPERAZINE EDISYLATE 5 MG/ML
5 INJECTION INTRAMUSCULAR; INTRAVENOUS
Status: ACTIVE | OUTPATIENT
Start: 2022-07-11 | End: 2022-07-11

## 2022-07-11 RX ORDER — MIDAZOLAM HYDROCHLORIDE 2 MG/2ML
2 INJECTION, SOLUTION INTRAMUSCULAR; INTRAVENOUS
Status: CANCELLED | OUTPATIENT
Start: 2022-07-11 | End: 2022-07-11

## 2022-07-11 RX ORDER — PHENYLEPHRINE HCL IN 0.9% NACL 50MG/250ML
0-100 PLASTIC BAG, INJECTION (ML) INTRAVENOUS
Status: DISCONTINUED | OUTPATIENT
Start: 2022-07-11 | End: 2022-07-13 | Stop reason: HOSPADM

## 2022-07-11 RX ADMIN — SODIUM CHLORIDE, SODIUM LACTATE, POTASSIUM CHLORIDE, AND CALCIUM CHLORIDE: 600; 310; 30; 20 INJECTION, SOLUTION INTRAVENOUS at 14:11

## 2022-07-11 RX ADMIN — PHENYLEPHRINE HYDROCHLORIDE 1 ML: 10 INJECTION INTRAVENOUS at 15:15

## 2022-07-11 RX ADMIN — SODIUM CHLORIDE, PRESERVATIVE FREE 5 ML: 5 INJECTION INTRAVENOUS at 09:43

## 2022-07-11 RX ADMIN — ALBUMIN (HUMAN) 12.5 G: 12.5 INJECTION, SOLUTION INTRAVENOUS at 16:39

## 2022-07-11 RX ADMIN — PHENYLEPHRINE HYDROCHLORIDE 1 ML: 10 INJECTION INTRAVENOUS at 15:48

## 2022-07-11 RX ADMIN — PHENYLEPHRINE HYDROCHLORIDE 1 ML: 10 INJECTION INTRAVENOUS at 15:27

## 2022-07-11 RX ADMIN — SODIUM CHLORIDE, PRESERVATIVE FREE 5 ML: 5 INJECTION INTRAVENOUS at 21:22

## 2022-07-11 RX ADMIN — PROPOFOL 30 MG: 10 INJECTION, EMULSION INTRAVENOUS at 14:40

## 2022-07-11 RX ADMIN — PHENYLEPHRINE HYDROCHLORIDE 1 ML: 10 INJECTION INTRAVENOUS at 14:28

## 2022-07-11 RX ADMIN — PHENYLEPHRINE HYDROCHLORIDE 1 ML: 10 INJECTION INTRAVENOUS at 14:24

## 2022-07-11 RX ADMIN — SPIRONOLACTONE 25 MG: 25 TABLET ORAL at 09:43

## 2022-07-11 RX ADMIN — SACUBITRIL AND VALSARTAN 1 TABLET: 24; 26 TABLET, FILM COATED ORAL at 09:43

## 2022-07-11 RX ADMIN — ATORVASTATIN CALCIUM 80 MG: 80 TABLET, FILM COATED ORAL at 21:21

## 2022-07-11 RX ADMIN — MIDAZOLAM 2 MG: 1 INJECTION INTRAMUSCULAR; INTRAVENOUS at 14:24

## 2022-07-11 RX ADMIN — PHENYLEPHRINE HYDROCHLORIDE 1 ML: 10 INJECTION INTRAVENOUS at 15:49

## 2022-07-11 RX ADMIN — AMIODARONE HYDROCHLORIDE 200 MG: 200 TABLET ORAL at 09:43

## 2022-07-11 RX ADMIN — DEXMEDETOMIDINE HYDROCHLORIDE 0.7 MCG/KG/HR: 4 INJECTION, SOLUTION INTRAVENOUS at 14:24

## 2022-07-11 RX ADMIN — PHENYLEPHRINE HYDROCHLORIDE 80 MCG/MIN: 10 INJECTION INTRAVENOUS at 14:25

## 2022-07-11 RX ADMIN — ASPIRIN 81 MG: 81 TABLET, CHEWABLE ORAL at 09:42

## 2022-07-11 RX ADMIN — SODIUM CHLORIDE, PRESERVATIVE FREE 5 ML: 5 INJECTION INTRAVENOUS at 18:23

## 2022-07-11 RX ADMIN — CARVEDILOL 31.25 MG: 25 TABLET, FILM COATED ORAL at 09:42

## 2022-07-11 RX ADMIN — PHENYLEPHRINE HYDROCHLORIDE 1 ML: 10 INJECTION INTRAVENOUS at 15:25

## 2022-07-11 RX ADMIN — PROPOFOL 50 MCG/KG/MIN: 10 INJECTION, EMULSION INTRAVENOUS at 14:53

## 2022-07-11 RX ADMIN — PROPOFOL 20 MG: 10 INJECTION, EMULSION INTRAVENOUS at 14:42

## 2022-07-11 RX ADMIN — FUROSEMIDE 10 MG/HR: 10 INJECTION, SOLUTION INTRAMUSCULAR; INTRAVENOUS at 06:33

## 2022-07-11 ASSESSMENT — ENCOUNTER SYMPTOMS
HEARTBURN: 0
ABDOMINAL PAIN: 0
RIGHT EYE: 0
NAUSEA: 0
WHEEZING: 0
BACK PAIN: 0
SORE THROAT: 0
BLURRED VISION: 0
HEMATEMESIS: 0
DIARRHEA: 0
SHORTNESS OF BREATH: 1
VOMITING: 0
CONSTIPATION: 0
HEMOPTYSIS: 0
COUGH: 0
COLOR CHANGE: 0
LEFT EYE: 0
SPUTUM PRODUCTION: 0
ORTHOPNEA: 0

## 2022-07-11 ASSESSMENT — PAIN SCALES - GENERAL
PAINLEVEL_OUTOF10: 0
PAINLEVEL_OUTOF10: 0

## 2022-07-11 NOTE — PROGRESS NOTES
Patient's BP 77/54. Dr. Jean Pierre Villela at bedside. Orders to restart Neeru gtt at 30mcg/min. Patient A&Ox3, Patient without complains. Dr. Jessica Anguiano notified. Will try and wean patient off neeru gtt before return to floor.

## 2022-07-11 NOTE — PROGRESS NOTES
TRANSFER - IN REPORT:    Verbal report received from [address] on [address]  being received from [place] for routine post-op      Report consisted of patient's Situation, Background, Assessment and   Recommendations(SBAR). Information from the following report(s) Nurse Handoff Report, Med Rec Status, Cardiac Rhythm NSR BBB and Alarm Parameters was reviewed with the receiving nurse. Opportunity for questions and clarification was provided. Assessment completed upon patient's arrival to unit and care assumed.

## 2022-07-11 NOTE — NURSE NAVIGATOR
Pt is off the floor currently in EP lab for ablation. cousin is in the room and assists with care/needs for the pt. Educational information/pamphlet provided for the pt regarding aortic stenosis and treatment options (SAVR and TAVR). Provided pt the CardioSmart tool for review as the shared decision making process continues between pt and valve team physicians. Also explained that the CT will evaluate the pt for a potential TAVR/SAVR. Plans for the TAVR protocol CT tomorrow afternoon. Spoke with primary RN and plans for CT tomorrow, pt can have breakfast but plan to hold lunch for afternoon CT. Contact information provided for any further questions.     Crystal Yuan, Structural Heart Navigator

## 2022-07-11 NOTE — PROGRESS NOTES
TRANSFER - IN REPORT:    Verbal report received from [facility] on [employer]  being received from EP lab for routine progression of patient care      Report consisted of patient's Situation, Background, Assessment and   Recommendations(SBAR). Information from the following report(s) Nurse Handoff Report was reviewed with the receiving nurse. Opportunity for questions and clarification was provided. Assessment completed upon patient's arrival to unit and care assumed.

## 2022-07-11 NOTE — ANESTHESIA POSTPROCEDURE EVALUATION
Department of Anesthesiology  Postprocedure Note    Patient: Robert Felton  MRN: [id number]  YOB: 1950  Date of evaluation: 7/11/2022      Procedure Summary     Date: 07/11/22 Room / Location: [facility] EP/CATH 4 ALL EVENTS / [facility] CARDIAC CATH LAB    Anesthesia Start: 3413 Anesthesia Stop: 4710    Procedure: ABLATION A-FLUTTER (N/A ) Diagnosis:       Atrial flutter (HCC)      (A flutter)    Providers: Anand Locke MD Responsible Provider: Michele Robles MD    Anesthesia Type: TIVA ASA Status: 4          Anesthesia Type: No value filed. Davian Phase I:      Davian Phase II:        Anesthesia Post Evaluation    Patient location during evaluation: bedside  Patient participation: complete - patient participated  Level of consciousness: awake and alert  Pain score: 1  Airway patency: patent  Nausea & Vomiting: no vomiting  Complications: no  Cardiovascular status: hemodynamically stable and vasoactive/inotropes  Respiratory status: acceptable  Hydration status: euvolemic  Comments: Patient continuing to require phenylephrine gtt. Will need ICU care overnight. Dr. Heller Skill aware.

## 2022-07-11 NOTE — PROGRESS NOTES
am  7/11/2022 6:27 AM    Admit Date: 7/8/2022    Admit Diagnosis: CHF (congestive heart failure) ([facility]) [I50.9]  Respiratory distress [R06.03]  Acute on chronic systolic (congestive) heart failure (HCC) [I50.23]  Acute congestive heart failure, unspecified heart failure type ([facility] [I50.9]      Subjective:    Patient : Patient admitted from the office for shortness of breath presumed fluid overload from HFrEF. He was attempting to undergo an echocardiogram but could not lay flat for this. While in the emergency room he was noted to be in stable ventricular tachycardia he received intravenous amiodarone which then broke his apparent VT to sinus rhythm with left bundle branch block    7/10/22  Heart cath and echo demonstrated severe nonischemic cardiomyopathy severely reduced ejection fraction with a EF in the 15 to 20% range. He also appears to have significant aortic stenosis. He appears to be responding reasonably well to escalating medical therapy    7/11/22  Patient continues slow improvement clinically. He still does appear to be volume overloaded. I will escalate his Lasix to in the intravenous continuous drip for the next 24 hours. I will also marginally increase his Coreg to 31.25 mg p.o. twice daily to try to achieve better rate control for his tachycardia.   Discussion will take place today with TAVR cardiologist and electrophysiologist concerning what appears to be low-flow low gradient significant AAS and secondary prevention for multiple episodes of ventricular tachycardia noted on a outpatients monitor    Objective:    /64   Pulse (!) 102   Temp 97.9 °F (36.6 °C) (Oral)   Resp 16   Ht 5' 9\" (1.753 m)   Wt 260 lb (117.9 kg)   SpO2 95%   BMI 38.40 kg/m²     ROS:  General ROS: negative for - chills  Hematological and Lymphatic ROS: negative for - blood clots or jaundice  Respiratory ROS: no cough, shortness of breath, or wheezing  Cardiovascular ROS: positive for - dyspnea on exertion  Gastrointestinal ROS: no abdominal pain, change in bowel habits, or black or bloody stools  Neurological ROS: no TIA or stroke symptoms    Physical Exam:      Physical Examination: General appearance - Appearance: alert, well appearing, and in no distress.    Neck/lymph - supple, no significant adenopathy  Chest/CV - clear to auscultation, no wheezes, rales or rhonchi, symmetric air entry  Heart - normal rate, regular rhythm, normal S1, S2, no murmurs, rubs, clicks or gallops  Abdomen/GI - soft, nontender, nondistended, no masses or organomegaly   Musculoskeletal - no joint tenderness, deformity or swelling  Extremities - peripheral pulses normal, no pedal edema, no clubbing or cyanosis  Skin - normal coloration and turgor, no rashes, no suspicious skin lesions noted    Current Facility-Administered Medications   Medication Dose Route Frequency    furosemide (LASIX) 100 mg in sodium chloride 0.9 % 100 mL infusion (mini-bag)  10 mg/hr IntraVENous Continuous    carvedilol (COREG) tablet 31.25 mg  31.25 mg Oral BID     vitamin D (ERGOCALCIFEROL) capsule 50,000 Units  50,000 Units Oral Weekly    sacubitril-valsartan (ENTRESTO) 24-26 MG per tablet 1 tablet  1 tablet Oral BID    spironolactone (ALDACTONE) tablet 25 mg  25 mg Oral Daily    empagliflozin (JARDIANCE) tablet 10 mg  10 mg Oral Daily    amiodarone (CORDARONE) tablet 200 mg  200 mg Oral BID    sodium chloride flush 0.9 % injection 5 mL  5 mL IntraVENous Q8H    sodium chloride flush 0.9 % injection 5 mL  5 mL IntraVENous PRN    aspirin chewable tablet 81 mg  81 mg Oral Daily    apixaban (ELIQUIS) tablet 5 mg  5 mg Oral BID    atorvastatin (LIPITOR) tablet 80 mg  80 mg Oral Nightly    sodium chloride flush 0.9 % injection 5-40 mL  5-40 mL IntraVENous PRN    0.9 % sodium chloride infusion   IntraVENous PRN    ondansetron (ZOFRAN-ODT) disintegrating tablet 4 mg  4 mg Oral Q8H PRN    Or    ondansetron (ZOFRAN) injection 4 mg  4 mg IntraVENous Q6H PRN    polyethylene glycol (GLYCOLAX) packet 17 g  17 g Oral Daily PRN    acetaminophen (TYLENOL) tablet 650 mg  650 mg Oral Q6H PRN    Or    acetaminophen (TYLENOL) suppository 650 mg  650 mg Rectal Q6H PRN    insulin lispro (HUMALOG) injection vial 0-4 Units  0-4 Units SubCUTAneous TID WC    insulin lispro (HUMALOG) injection vial 0-4 Units  0-4 Units SubCUTAneous Nightly       Data Review: data included in this note has been independently reviewed by the author     TELEMETRY: Sinus rhythm with left bundle branch block    Assessment/Plan:     Patient Active Problem List   Diagnosis    Psoriasis    Other psoriasis    Dyslipidemia    Carotid artery stenosis    Essential hypertension, benign    Atrial flutter ([facility] [address]    CAD (coronary artery disease)    Acute on chronic systolic (congestive) heart failure (HCC)     PLAN  HFrEF  Patient needs to undergo intensive medical therapy diuresis and cardiac catheterization. He also will need [facility] referral during this time while he is treated for this new onset HFrEF    Will continue to titrate medications for HFrEF I will stop his Norvasc and increase his Coreg to allow for better rate control of his tachycardia. This will also maximize HFrEF medications. We will need to address if we are comfortable that he does indeed have low-flow low gradient severe AS or if he will need a right heart cath with concomitant LV pressure measurements to get a cath derived aortic valve area    Patient has had no more ventricular tachycardia since admission and initiation of amiodarone therapy.   Will discuss with electrophysiology whether his current findings indicate BiV ICD placement at this point or if we need to wait the 3 months while patient gets optimized line directed medical therapy      Zehra Fatima MD

## 2022-07-11 NOTE — CONSULTS
Yonis Olivier. MD Mallory Leal. Mike Reddy MD           CONSULT      Eris Rizvi         7/8/2022 1950    REFERRING PHYSICIAN:  Dr. Anisha Gomez       HISTORY OF PRESENT ILLNESS:  The patient is a 73 y.o. male who is seen for evaluation of aortic stenosis. He was previously followed by Dr Liya Noe for CAD and AS. He had been lost to follow up for several years. He was seen by Dr Casper Dupree in June. Echocardiogram and nuclear stress test were planned. On arrival for his echocardiogram, he was unable to lie flat. Images showed a severely reduced LV EF. He was directly admitted to [facility]. Echocardiogram showed low flow low gradient severe AS with severely reduced LV EF. LHC showed diffuse moderate CAD. After diuresis, he feels much better and denies any dyspnea. He remains tachycardic with LBBB. ** No history of stroke, TIA, prior cardiac surgery, prior vascular surgery, anesthetic complication, lung disease, DVT or PE, GI bleeding       Past Medical History:   Diagnosis Date    Atrial fibrillation ([address] 9/15/2013    Benign neoplasm of pineal gland ([address] 9/15/2013    CAD (coronary artery disease)     stent x 1     DM (diabetes mellitus) ([address] 6/26/2012    Essential hypertension, benign 9/15/2013    HLD (hyperlipidemia)     managed with medication     HTN (hypertension)     managed with medication     Hypercholesterolemia     Hypertension 7/12/2013    Irregular heart beat     cardiac ablation corrected    Obesity     BMI 37.5    Other and unspecified hyperlipidemia 9/15/2013    Other psoriasis 9/15/2013    Peripheral vascular disease, unspecified ([address] 9/15/2013    Psoriasis     Type 2 diabetes mellitus ([address] 2012    insulin reliant/ Avg FBS 85-90/ no S/S of low BS     Unspecified sleep apnea     no tx at this time.         Past Surgical History:   Procedure Laterality Date    ABLATION OF DYSRHYTHMIC FOCUS      CAROTID ENDARTERECTOMY Right 07/2013    LEFT HEART CATH,PERCUTANEOUS 6/27/2012    Xience to mid LAD       Family History   Problem Relation Age of Onset    Diabetes Mother     Cancer Father     Heart Disease Mother     Hypertension Mother        Social History     Socioeconomic History    Marital status:      Spouse name: Not on file    Number of children: Not on file    Years of education: Not on file    Highest education level: Not on file   Occupational History    Not on file   Tobacco Use    Smoking status: Never Smoker    Smokeless tobacco: Never Used   Substance and Sexual Activity    Alcohol use: Yes    Drug use: No    Sexual activity: Not on file   Other Topics Concern    Not on file   Social History Narrative    Not on file     Social Determinants of Health     Financial Resource Strain:     Difficulty of Paying Living Expenses: Not on file   Food Insecurity:     Worried About Running Out of Food in the Last Year: Not on file    Bear of Food in the Last Year: Not on file   Transportation Needs:     Lack of Transportation (Medical): Not on file    Lack of Transportation (Non-Medical):  Not on file   Physical Activity:     Days of Exercise per Week: Not on file    Minutes of Exercise per Session: Not on file   Stress:     Feeling of Stress : Not on file   Social Connections:     Frequency of Communication with Friends and Family: Not on file    Frequency of Social Gatherings with Friends and Family: Not on file    Attends Advent Services: Not on file    Active Member of Clubs or Organizations: Not on file    Attends Club or Organization Meetings: Not on file    Marital Status: Not on file   Intimate Partner Violence:     Fear of Current or Ex-Partner: Not on file    Emotionally Abused: Not on file    Physically Abused: Not on file    Sexually Abused: Not on file   Housing Stability:     Unable to Pay for Housing in the Last Year: Not on file    Number of [place] in the Last Year: Not on file    Unstable Housing in the Last Year: Not on file       No Known Allergies    No current facility-administered medications on file prior to encounter. Current Outpatient Medications on File Prior to Encounter   Medication Sig Dispense Refill    nitroGLYCERIN (NITROSTAT) 0.4 MG SL tablet Place 1 tablet under the tongue every 5 minutes as needed for Chest pain up to max of 3 total doses. If no relief after 1 dose, call 911. 25 tablet 3    amLODIPine (NORVASC) 5 MG tablet Take 5 mg by mouth daily      apixaban (ELIQUIS) 5 MG TABS tablet Take 5 mg by mouth 2 times daily      aspirin 81 MG chewable tablet Take 81 mg by mouth      atorvastatin (LIPITOR) 80 MG tablet TAKE 1 TABLET BY MOUTH EVERY DAY      carvedilol (COREG) 12.5 MG tablet Take 12.5 mg by mouth 2 times daily (with meals)      dapagliflozin (FARXIGA) 10 MG tablet Take 10 mg by mouth daily      irbesartan (AVAPRO) 300 MG tablet Take 300 mg by mouth daily      metFORMIN (GLUCOPHAGE) 1000 MG tablet Take 1,000 mg by mouth 2 times daily (with meals)         REVIEW OF SYSTEMS:  Review of Systems   Constitutional: Negative for chills, fever, malaise/fatigue, weight gain and weight loss. HENT: Negative for ear pain, hearing loss, nosebleeds, sore throat and tinnitus. Eyes: Negative for blurred vision, vision loss in left eye and vision loss in right eye. Cardiovascular: Positive for dyspnea on exertion and leg swelling. Negative for chest pain, near-syncope, orthopnea, palpitations, paroxysmal nocturnal dyspnea and syncope. Respiratory: Positive for shortness of breath. Negative for cough, hemoptysis, sputum production and wheezing. Endocrine: Negative for cold intolerance, heat intolerance and polydipsia. Hematologic/Lymphatic: Negative for bleeding problem. Does not bruise/bleed easily. Skin: Negative for color change and rash. Musculoskeletal: Negative for back pain, joint pain, joint swelling and myalgias.    Gastrointestinal: Negative for abdominal pain, constipation, diarrhea, dysphagia, heartburn, hematemesis, melena, nausea and vomiting. Genitourinary: Negative for dysuria, frequency, hematuria and urgency. Neurological: Negative for difficulty with concentration, dizziness, headaches, light-headedness, numbness, paresthesias, seizures, vertigo and weakness. Psychiatric/Behavioral: Negative for altered mental status and depression. Physical Exam  Vitals:    07/11/22 0010 07/11/22 0542 07/11/22 0730 07/11/22 0942   BP: 100/65 108/64 129/80 108/67   Pulse: 100 (!) 102 (!) 104 (!) 105   Resp: 18 16 18    Temp: 97.3 °F (36.3 °C) 97.9 °F (36.6 °C) 98 °F (36.7 °C)    TempSrc: Oral Oral Oral    SpO2: 94% 95% 95%    Weight:  246 lb 9.6 oz (111.9 kg)     Height:           Physical Exam:  General: Well Developed, Obese, No Acute Distress  HEENT: Normocephalic, pupils equal and round, no scleral icterus  Neck: supple, no JVD  Chest wall: No deformity  Heart: S1S2 with RRR with grade II/VI GALINA   Lungs: Clear throughout auscultation bilaterally without adventitious sounds  Abd: soft, nontender, nondistended, with good bowel sounds, no pulsatile masses  Ext: warm, no edema, calves supple/nontender, pulses 2+ bilaterally  Skin: warm and dry, no rashes, cyanosis, jaundice, ecchymoses or evidence of skin breakdown  Psychiatric: Normal mood and affect  Neurologic: Alert and oriented X 3, no focal deficit noted    Labs:   Recent Labs     07/08/22  1725 07/08/22  1725 07/09/22  0640 07/09/22  0640 07/10/22  0734 07/11/22  0708      < > 146*   < > 139 140   K 4.6   < > 4.0   < > 3.8 3.5   MG 1.9   < > 1.8   < > 2.2 2.1   BUN 17   < > 17   < > 21 32*   WBC 10.3  --   --   --   --   --    HGB 14.4  --   --   --   --   --    HCT 45.3  --   --   --   --   --      --   --   --   --   --    CHOL  --   --  99  --   --   --    HDL  --   --  29*  --   --   --     < > = values in this interval not displayed.        Lab Results   Component Value Date/Time    CHOL 99 07/09/2022 06:40 AM    HDL 29 07/09/2022 06:40 AM       The patient has NYHA Class IV symptoms. The mortality risk for AVR surgery is elevated at 4.526%. In this split/shared patient encounter, I personally reviewed the patient's past medical history, conducted a current medical history and review of systems, and conducted a medically appropriate physical exam. I reviewed the available labs, prior imaging, and current medication list.   The above accounted for 23 minutes of clinical time. Luz Campuzano PA-C    Assessment:     Active Hospital Problems    *Acute on chronic systolic (congestive) heart failure (HCC)  Aortic stenosis  CAD  Obesity  Hypertension        Plan:     Andrea Chiu is to see preoperative teaching film that thoroughly discusses procedure, risks, and possible complications. Risks, benefits, and alternatives were discussed to include, but not limited to:     1. Bleeding  2. Arrhythmia   3. Infection including mediastinitis  4. Myocardial infarction  5. Need for reoperation  6. Renal failure  7. Respiratory failure  8. Stroke  9. Potential death      SAVR vs TAVR discussed. In this split/shared patient encounter, I personally viewed the cardiac catheterization films and echocardiogram. I reviewed the current and past patient medical history obtained by [facility]. I reviewed the available labs and current medication list. I discussed the cardiac catheterization and echocardiogram results and educated the patient/family on treatment options. I ordered additional tests and procedures as indicated and coordinated care for the selected treatment plan, which accounted for 27 minutes of clinical time.

## 2022-07-11 NOTE — CONSULTS
[facility] Cardiology/Electrophyiology Consult                Date of  Admission: 7/8/2022  5:19 PM     CC/Reason for consult: Lynda Sharp is a 73 y.o. male admitted for CHF (congestive heart failure) (HCC) [I50.9]  Respiratory distress [R06.03]  Acute on chronic systolic (congestive) heart failure (HCC) [I50.23]  Acute congestive heart failure, unspecified heart failure type ([address] [I50.9]. 73yo WM with a history of HTN, CAD s/p remote PCI, CINDY s/p CEA, HLD, DM admitted with WCT, acutely decompensated CHF (EF 15%), and low flow low gradient severe AS and EP was consulted for [facility]. Pt reports for several days to a week of feeling more SOB, RUSSELL. He denies palpitations, rapid HR, chest pain, syncope, or presyncope. He has had low energy, able to do less activity, and presented for echo and was noted to be volume overloaded, orthopnea, unable to lay flat for echo and was admitted for decompensated CHF. Pt has had persistent tachycardia with periods or very rapid rates on telemetry. Cardiac PMH: (Old records have been reviewed and summarized below)  TTE (7/8/22): EF 20%, severe AS  Cath (7/8/22):   1. Diffuse moderate nonobstructive coronary artery disease  2. Severely reduced systolic function EF 15 to 20%  3.   At least moderate aortic stenosis    Patient Active Problem List   Diagnosis    Psoriasis    Other psoriasis    Dyslipidemia    Carotid artery stenosis    Essential hypertension, benign    Atrial flutter (HCC)    CAD (coronary artery disease)    Acute on chronic systolic (congestive) heart failure ([address]       Past Medical History:   Diagnosis Date    Atrial fibrillation ([address] 9/15/2013    Benign neoplasm of pineal gland ([address] 9/15/2013    CAD (coronary artery disease)     stent x 1     DM (diabetes mellitus) ([address] 6/26/2012    Essential hypertension, benign 9/15/2013    HLD (hyperlipidemia)     managed with medication     HTN (hypertension)     managed with medication     Hypercholesterolemia     Hypertension 7/12/2013    Irregular heart beat     cardiac ablation corrected    Obesity     BMI 37.5    Other and unspecified hyperlipidemia 9/15/2013    Other psoriasis 9/15/2013    Peripheral vascular disease, unspecified ([facility] [address] 9/15/2013    Psoriasis     Type 2 diabetes mellitus ([facility] 2012    insulin reliant/ Avg FBS 85-90/ no S/S of low BS     Unspecified sleep apnea     no tx at this time.        Past Surgical History:   Procedure Laterality Date    ABLATION OF DYSRHYTHMIC FOCUS      CARDIAC PROCEDURE N/A 7/9/2022    LEFT HEART CATH / CORONARY ANGIOGRAPHY performed by Ezra Grier MD at [address] [place] [address] CATH LAB    CAROTID ENDARTERECTOMY Right 07/2013    LEFT HEART CATH,PERCUTANEOUS  6/27/2012    Xience to mid LAD     No Known Allergies   Family History   Problem Relation Age of Onset    Diabetes Mother     Cancer Father     Heart Disease Mother     Hypertension Mother         Current Facility-Administered Medications   Medication Dose Route Frequency    furosemide (LASIX) 100 mg in sodium chloride 0.9 % 100 mL infusion (mini-bag)  10 mg/hr IntraVENous Continuous    carvedilol (COREG) tablet 31.25 mg  31.25 mg Oral BID WC    vitamin D (ERGOCALCIFEROL) capsule 50,000 Units  50,000 Units Oral Weekly    sacubitril-valsartan (ENTRESTO) 24-26 MG per tablet 1 tablet  1 tablet Oral BID    spironolactone (ALDACTONE) tablet 25 mg  25 mg Oral Daily    [Held by provider] empagliflozin (JARDIANCE) tablet 10 mg  10 mg Oral Daily    amiodarone (CORDARONE) tablet 200 mg  200 mg Oral BID    sodium chloride flush 0.9 % injection 5 mL  5 mL IntraVENous Q8H    sodium chloride flush 0.9 % injection 5 mL  5 mL IntraVENous PRN    aspirin chewable tablet 81 mg  81 mg Oral Daily    [Held by provider] apixaban (ELIQUIS) tablet 5 mg  5 mg Oral BID    atorvastatin (LIPITOR) tablet 80 mg  80 mg Oral Nightly    sodium chloride flush 0.9 % injection 5-40 mL  5-40 mL IntraVENous PRN    0.9 % sodium chloride infusion   IntraVENous PRN    ondansetron (ZOFRAN-ODT) disintegrating tablet 4 mg  4 mg Oral Q8H PRN    Or    ondansetron (ZOFRAN) injection 4 mg  4 mg IntraVENous Q6H PRN    polyethylene glycol (GLYCOLAX) packet 17 g  17 g Oral Daily PRN    acetaminophen (TYLENOL) tablet 650 mg  650 mg Oral Q6H PRN    Or    acetaminophen (TYLENOL) suppository 650 mg  650 mg Rectal Q6H PRN    insulin lispro (HUMALOG) injection vial 0-4 Units  0-4 Units SubCUTAneous TID WC    insulin lispro (HUMALOG) injection vial 0-4 Units  0-4 Units SubCUTAneous Nightly       Review of Symptoms:  A comprehensive ROS was performed with the pertinent positives and negatives mentioned in the HPI, all other systems reviewed and are negative       Physical Exam  Vitals:    07/11/22 0010 07/11/22 0542 07/11/22 0730 07/11/22 0942   BP: 100/65 108/64 129/80 108/67   Pulse: 100 (!) 102 (!) 104 (!) 105   Resp: 18 16 18    Temp: 97.3 °F (36.3 °C) 97.9 °F (36.6 °C) 98 °F (36.7 °C)    TempSrc: Oral Oral Oral    SpO2: 94% 95% 95%    Weight:  246 lb 9.6 oz (111.9 kg)     Height:           Physical Exam:  General appearance - Alert, well appearing, and in no distress   Mental status - Affect appropriate to mood. Eyes - Sclerae anicteric  ENMT - Hearing grossly normal bilaterally  Neck - Carotids upstroke normal bilaterally  Resp - Clear to auscultation, no wheezes, rales, very distant BS B/L  Heart - tachycardic, very distant S1, S2, +SM, rubs, clicks or gallops. GI - Soft, nontender, nondistended  Neurological - Grossly intact - normal speech, no focal findings  Musculoskeletal - No joint tenderness, deformity or swelling, no muscular tenderness noted.   Extremities - Peripheral pulses normal, no pedal edema    Cardiographics    Telemetry: tachycardia, consistent with atrial flutter  ECG (Indpendently visualized and interpreted):  Echocardiogram:  See above     Labs:   Recent Labs     07/08/22  1725 07/08/22  1725 07/09/22  0640 07/09/22  0640 07/10/22  0734 07/11/22  0708      < > 146*   < > 139 140   K 4.6   < > 4.0   < > 3.8 3.5   MG 1.9   < > 1.8   < > 2.2 2.1   BUN 17   < > 17   < > 21 32*   WBC 10.3  --   --   --   --   --    HGB 14.4  --   --   --   --   --    HCT 45.3  --   --   --   --   --      --   --   --   --   --    HDL  --   --  29*  --   --   --     < > = values in this interval not displayed. Assessment:      Principal Problem:    Acute on chronic systolic (congestive) heart failure (HCC)  Active Problems:    Atrial flutter ([address]  Resolved Problems:    * No resolved hospital problems. *         Plan:   1. WCT, atypical atrial flutter: 70yo with underlying LBBB and EKGs consistent with atypical atrial flutter and episodes of very rapid HR with consistent LBBB pattern and consistent with SVT with aberrant conduction and not VT. I had a discussion with the Pt today regarding rate and rhythm control strategies, rhythm control strategy treatment options including DCCV, antiarrhythmic therapy, catheter ablation and the combination of the above. I discussed at length the advantages and disadvantages of all treatment strategies. We discussed the option of cardiac ablation in detail, including discussion of some of the more common, however, very low risks of the procedure including access complications and risks of damage to the heart or surrounding structures. The patient will also meet with the staff anesthesiologist the day of the procedure to discuss some of the same possible risks, as well as other risks, specific to the patient undergoing anesthesia. Pt will undergo a transesophageal echocardiogram immediately prior to catheter insertion to completely exclude the possibility of left atrial appendage thrombus.         --JORI followed by EPS and possible atrial flutter ablation        --given severe LV dysfunction, severe AS, if aflutter is left sided, will plan for DCCV as already discussed with

## 2022-07-11 NOTE — ANESTHESIA PRE PROCEDURE
Department of Anesthesiology  Preprocedure Note       Name:  Santos Jeffers   Age:  73 y.o.  :  1950                                          MRN:  [id number]         Date:  2022      Surgeon: Lonna Frankel):  Aryan Waters MD    Procedure: Procedure(s):  ABLATION A-FLUTTER    Medications prior to admission:   Prior to Admission medications    Medication Sig Start Date End Date Taking? Authorizing Provider   nitroGLYCERIN (NITROSTAT) 0.4 MG SL tablet Place 1 tablet under the tongue every 5 minutes as needed for Chest pain up to max of 3 total doses.  If no relief after 1 dose, call 911. 22   Serg Carter MD   amLODIPine (NORVASC) 5 MG tablet Take 5 mg by mouth daily 3/9/20   Ar Automatic Reconciliation   apixaban (ELIQUIS) 5 MG TABS tablet Take 5 mg by mouth 2 times daily 3/9/20   Ar Automatic Reconciliation   aspirin 81 MG chewable tablet Take 81 mg by mouth 12   Ar Automatic Reconciliation   atorvastatin (LIPITOR) 80 MG tablet TAKE 1 TABLET BY MOUTH EVERY DAY 19   Ar Automatic Reconciliation   carvedilol (COREG) 12.5 MG tablet Take 12.5 mg by mouth 2 times daily (with meals) 3/9/20   Ar Automatic Reconciliation   dapagliflozin (FARXIGA) 10 MG tablet Take 10 mg by mouth daily 3/9/20   Ar Automatic Reconciliation   irbesartan (AVAPRO) 300 MG tablet Take 300 mg by mouth daily 3/9/20   Ar Automatic Reconciliation   metFORMIN (GLUCOPHAGE) 1000 MG tablet Take 1,000 mg by mouth 2 times daily (with meals) 19   Ar Automatic Reconciliation       Current medications:    Current Facility-Administered Medications   Medication Dose Route Frequency Provider Last Rate Last Admin    furosemide (LASIX) 100 mg in sodium chloride 0.9 % 100 mL infusion (mini-bag)  10 mg/hr IntraVENous Continuous Anthony Canales MD 10 mL/hr at 2233 10 mg/hr at 22 06    carvedilol (COREG) tablet 31.25 mg  31.25 mg Oral BID  Anthony Canales MD   31.25 mg at 22 0942    vitamin D (ERGOCALCIFEROL) capsule 50,000 Units  50,000 Units Oral Weekly Manisha Rabago MD   50,000 Units at 07/10/22 0946    sacubitril-valsartan (ENTRESTO) 24-26 MG per tablet 1 tablet  1 tablet Oral BID Manisha Rabago MD   1 tablet at 07/11/22 1272    spironolactone (ALDACTONE) tablet 25 mg  25 mg Oral Daily Manisha Rabago MD   25 mg at 07/11/22 0943    [Held by provider] empagliflozin (JARDIANCE) tablet 10 mg  10 mg Oral Daily Manisha Rabago MD   10 mg at 07/10/22 0946    amiodarone (CORDARONE) tablet 200 mg  200 mg Oral BID Manisha Rabago MD   200 mg at 07/11/22 0943    sodium chloride flush 0.9 % injection 5 mL  5 mL IntraVENous Q8H Caleb Butler DO   5 mL at 07/11/22 0943    sodium chloride flush 0.9 % injection 5 mL  5 mL IntraVENous PRN Dahiana Reyes DO        aspirin chewable tablet 81 mg  81 mg Oral Daily Carlolphgodfrey Krauset, APRN - CNP   81 mg at 07/11/22 1754    [Held by provider] apixaban (ELIQUIS) tablet 5 mg  5 mg Oral BID Rudolpho Jann, APRN - CNP   5 mg at 07/10/22 2044    atorvastatin (LIPITOR) tablet 80 mg  80 mg Oral Nightly Rudolpho Jann, APRN - CNP   80 mg at 07/10/22 2044    sodium chloride flush 0.9 % injection 5-40 mL  5-40 mL IntraVENous PRN Carlolpho Jann, APRN - CNP        0.9 % sodium chloride infusion   IntraVENous PRN Carlolpho Jann, APRN - CNP        ondansetron (ZOFRAN-ODT) disintegrating tablet 4 mg  4 mg Oral Q8H PRN Rudolpho Jann, APRN - CNP        Or    ondansetron (ZOFRAN) injection 4 mg  4 mg IntraVENous Q6H PRN Rudolpho Jann, APRN - CNP        polyethylene glycol (GLYCOLAX) packet 17 g  17 g Oral Daily PRN Carlolpho Jann, APRN - CNP        acetaminophen (TYLENOL) tablet 650 mg  650 mg Oral Q6H PRN LANDON Garibay CNP        Or    acetaminophen (TYLENOL) suppository 650 mg  650 mg Rectal Q6H PRN LANDON Garibay CNP        insulin lispro (HUMALOG) injection vial 0-4 Units  0-4 Units SubCUTAneous TID WC Karthik Linton, APRN - CNP        insulin lispro (HUMALOG) injection vial 0-4 Units  0-4 Units SubCUTAneous Nightly Midland Linton, APRN - CNP           Allergies:  No Known Allergies    Problem List:    Patient Active Problem List   Diagnosis Code    Psoriasis L40.9    Other psoriasis L40.8    Dyslipidemia E78.5    Carotid artery stenosis I65.29    Essential hypertension, benign I10    Atrial flutter (HCC) I48.92    CAD (coronary artery disease) I25.10    Acute on chronic systolic (congestive) heart failure (HCC) I50.23    Acute congestive heart failure ([facility] I50.9       Past Medical History:        Diagnosis Date    Atrial fibrillation ([facility] 9/15/2013    Benign neoplasm of pineal gland ([facility] 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/2013    CAD (coronary artery disease)     stent x 1     DM (diabetes mellitus) ([facility] 6/26/2012    Essential hypertension, benign 9/15/2013    HLD (hyperlipidemia)     managed with medication     HTN (hypertension)     managed with medication     Hypercholesterolemia     Hypertension 7/12/2013    Irregular heart beat     cardiac ablation corrected    Obesity     BMI 37.5    Other and unspecified hyperlipidemia 9/15/2013    Other psoriasis 9/15/2013    Peripheral vascular disease, unspecified ([facility] 9/15/2013    Psoriasis     Type 2 diabetes mellitus ([facility] 2012    insulin reliant/ Avg FBS 85-90/ no S/S of low BS     Unspecified sleep apnea     no tx at this time. Past Surgical History:        Procedure Laterality Date    ABLATION OF DYSRHYTHMIC FOCUS      CARDIAC PROCEDURE N/A 7/9/2022    LEFT HEART CATH / CORONARY ANGIOGRAPHY performed by Lyn Snow MD at [address] [place] [address] CATH LAB    CAROTID ENDARTERECTOMY Right 07/2013    LEFT HEART CATH,PERCUTANEOUS  6/27/2012    Xience to mid LAD       Social History:    Social History     Tobacco Use    Smoking status: Never Smoker    Smokeless tobacco: Never Used   Substance Use Topics    Alcohol use:  Yes Counseling given: Not Answered      Vital Signs (Current):   Vitals:    07/11/22 0542 07/11/22 0730 07/11/22 0942 07/11/22 1230   BP: 108/64 129/80 108/67 (!) 98/54   Pulse: (!) 102 (!) 104 (!) 105 (!) 107   Resp: 16 18 18   Temp: 97.9 °F (36.6 °C) 98 °F (36.7 °C)  98.1 °F (36.7 °C)   TempSrc: Oral Oral  Oral   SpO2: 95% 95%  94%   Weight: 246 lb 9.6 oz (111.9 kg)      Height:                                                  BP Readings from Last 3 Encounters:   07/11/22 (!) 98/54   07/08/22 (!) 138/100   06/23/22 (!) 137/93       NPO Status:                                                                                 BMI:   Wt Readings from Last 3 Encounters:   07/11/22 246 lb 9.6 oz (111.9 kg)   07/08/22 260 lb 8 oz (118.2 kg)   07/08/22 265 lb (120.2 kg)     Body mass index is 36.42 kg/m².     CBC:   Lab Results   Component Value Date/Time    WBC 10.3 07/08/2022 05:25 PM    RBC 4.96 07/08/2022 05:25 PM    HGB 14.4 07/08/2022 05:25 PM    HCT 45.3 07/08/2022 05:25 PM    MCV 91.3 07/08/2022 05:25 PM    MCV 88.7 03/09/2020 10:58 AM    RDW 15.0 07/08/2022 05:25 PM     07/08/2022 05:25 PM       CMP:   Lab Results   Component Value Date/Time     07/11/2022 07:08 AM    K 3.5 07/11/2022 07:08 AM     07/11/2022 07:08 AM    CO2 27 07/11/2022 07:08 AM    BUN 32 07/11/2022 07:08 AM    CREATININE 0.80 07/11/2022 07:08 AM    GFRAA >60 07/11/2022 07:08 AM    AGRATIO 1.7 03/09/2020 11:24 AM    LABGLOM >60 07/11/2022 07:08 AM    GLUCOSE 137 07/11/2022 07:08 AM    PROT 7.4 07/08/2022 05:25 PM    CALCIUM 9.3 07/11/2022 07:08 AM    BILITOT 1.5 07/08/2022 05:25 PM    ALKPHOS 68 07/08/2022 05:25 PM    ALKPHOS 75 03/09/2020 11:24 AM    AST 23 07/08/2022 05:25 PM    ALT 24 07/08/2022 05:25 PM       POC Tests:   Recent Labs     07/11/22  1101   POCGLU 156*       Coags: No results found for: PROTIME, INR, APTT    HCG (If Applicable): No results found for: PREGTESTUR, PREGSERUM, HCG, HCGQUANT     ABGs: No results found for: PHART, PO2ART, NGO3LAF, YVH0ULD, BEART, I5GMOPHH     Type & Screen (If Applicable):  No results found for: LABABO, LABRH    Drug/Infectious Status (If Applicable):  No results found for: HIV, HEPCAB    COVID-19 Screening (If Applicable): No results found for: COVID19        Anesthesia Evaluation    Airway: Mallampati: III  TM distance: >3 FB   Neck ROM: full  Comment: Short neck  Mouth opening: > = 3 FB   Dental: normal exam         Pulmonary:normal exam  breath sounds clear to auscultation  (+) sleep apnea: on noncompliant,                             Cardiovascular:  Exercise tolerance: poor (<4 METS), Until recently was able to mow lawn and throw mulch. (+) hypertension:, valvular problems/murmurs (severe AS, mod MR): MR and AS, CAD (s/p PCI/stent 2012):, dysrhythmias (s/p ablation 2012): atrial flutter and atrial fibrillation, CHF: systolic,         Rhythm: regular  Rate: normal                 ROS comment: Stress test 6/22:    Nuclear Findings: LV perfusion is abnormal.    Nuclear Findings: The study is negative for myocardial ischemia.   Nuclear Findings: Abnormal left ventricular systolic function post-stress. Global function is severely reduced. LVEF measures 15%.   Stress Test: A pharmacological stress test was performed using lexiscan. Echo 7/22:    Left Ventricle: Severely reduced left ventricular systolic function with a visually estimated EF of 20 - 25%. Left ventricle is mildly dilated. Mildly increased wall thickness. Findings consistent with mild concentric hypertrophy. Septal motion is consistent with bundle branch block. Severe global hypokinesis present with best preserved function of the inferolateral and lateral walls. Indeterminate diastolic function due to arrhythmia. Elevated left ventricular filling pressure. Average E/e' ratio is 33.08.    Aortic Valve: Severely calcified cusps. Mild regurgitation. Classic low flow/low gradient severe aortic stenosis with low EF.  AV peak gradient is 47 mmHg. AV peak velocity is 3.4 m/s. LVOT:AV VTI Index is 0.25.    Mitral Valve: Mild to moderate regurgitation with a centrally directed jet.   Tricuspid Valve: Mildly elevated RVSP. The estimated RVSP is 42 mmHg.   Left Atrium: Left atrium is moderately dilated.   Bilateral pleural effusion. Neuro/Psych:               GI/Hepatic/Renal:   (+) morbid obesity          Endo/Other:    (+) DiabetesType II DM, , .                 Abdominal:             Vascular:   + PVD, aortic or cerebral, . Other Findings:           Anesthesia Plan      TIVA     ASA 4     (71yo WM with a history of HTN, CAD s/p remote PCI, CINDY s/p CEA, HLD, DM admitted with WCT, acutely decompensated CHF (EF 15%), and low flow low gradient severe AS and EP was consulted for [facility]  High risk nature of surgery and anesthesia explained. No CP/worsening SOB/syncope)  Induction: intravenous. continuous noninvasive hemodynamic monitor    Anesthetic plan and risks discussed with patient.                         Yisel Witt MD   7/11/2022

## 2022-07-11 NOTE — PROGRESS NOTES
TRANSFER - OUT REPORT:    Verbal report given to [address]  on Missy Barakat  being transferred to CVICU for routine progression of patient care       Report consisted of patient's Situation, Background, Assessment and   Recommendations(SBAR). Information from the following report(s) Nurse Handoff Report, Intake/Output, MAR and Recent Results was reviewed with the receiving nurse. Lines:   Peripheral IV 07/08/22 Distal;Right; Anterior Cephalic (Active)   Site Assessment Clean, dry & intact 07/11/22 1209   Line Status Infusing 07/11/22 1209   Line Care Connections checked and tightened 07/11/22 1209   Phlebitis Assessment No symptoms 07/11/22 1209   Infiltration Assessment 0 07/11/22 1209   Alcohol Cap Used Yes 07/11/22 1209   Dressing Status Clean, dry & intact 07/11/22 1209   Dressing Type Transparent 07/11/22 1209        Opportunity for questions and clarification was provided. Patient family given belongings and transported to cath lab waiting room.

## 2022-07-11 NOTE — PROGRESS NOTES
TRANSFER - OUT REPORT:    Verbal report given to [address] on Mariana Altamirano  being transferred to CVICU for routine progression of patient care       Report consisted of patient's Situation, Background, Assessment and   Recommendations(SBAR). Information from the following report(s) Nurse Handoff Report was reviewed with the receiving nurse. Lines:   Peripheral IV 07/08/22 Distal;Right; Anterior Cephalic (Active)   Site Assessment Clean, dry & intact 07/11/22 1209   Line Status Infusing 07/11/22 1209   Line Care Connections checked and tightened 07/11/22 1209   Phlebitis Assessment No symptoms 07/11/22 1209   Infiltration Assessment 0 07/11/22 1209   Alcohol Cap Used Yes 07/11/22 1209   Dressing Status Clean, dry & intact 07/11/22 1209   Dressing Type Transparent 07/11/22 1209        Opportunity for questions and clarification was provided.       Patient transported with:  Registered Nurse

## 2022-07-12 ENCOUNTER — APPOINTMENT (OUTPATIENT)
Dept: CT IMAGING | Age: 72
DRG: 273 | End: 2022-07-12
Payer: MEDICARE

## 2022-07-12 LAB
ANION GAP SERPL CALC-SCNC: 3 MMOL/L (ref 7–16)
BUN SERPL-MCNC: 32 MG/DL (ref 8–23)
CALCIUM SERPL-MCNC: 9 MG/DL (ref 8.3–10.4)
CHLORIDE SERPL-SCNC: 104 MMOL/L (ref 98–107)
CO2 SERPL-SCNC: 31 MMOL/L (ref 21–32)
CREAT SERPL-MCNC: 1 MG/DL (ref 0.8–1.5)
ECHO BSA: 2.33 M2
ERYTHROCYTE [DISTWIDTH] IN BLOOD BY AUTOMATED COUNT: 15 % (ref 11.9–14.6)
GLUCOSE BLD STRIP.AUTO-MCNC: 109 MG/DL (ref 65–100)
GLUCOSE BLD STRIP.AUTO-MCNC: 186 MG/DL (ref 65–100)
GLUCOSE SERPL-MCNC: 137 MG/DL (ref 65–100)
HCT VFR BLD AUTO: 41.7 % (ref 41.1–50.3)
HGB BLD-MCNC: 13.2 G/DL (ref 13.6–17.2)
LV EF: 23 %
LVEF MODALITY: NORMAL
MAGNESIUM SERPL-MCNC: 1.9 MG/DL (ref 1.8–2.4)
MCH RBC QN AUTO: 29.1 PG (ref 26.1–32.9)
MCHC RBC AUTO-ENTMCNC: 31.7 G/DL (ref 31.4–35)
MCV RBC AUTO: 91.9 FL (ref 79.6–97.8)
NRBC # BLD: 0 K/UL (ref 0–0.2)
PLATELET # BLD AUTO: 141 K/UL (ref 150–450)
PMV BLD AUTO: 10.2 FL (ref 9.4–12.3)
POTASSIUM SERPL-SCNC: 3.8 MMOL/L (ref 3.5–5.1)
RBC # BLD AUTO: 4.54 M/UL (ref 4.23–5.6)
SERVICE CMNT-IMP: ABNORMAL
SERVICE CMNT-IMP: ABNORMAL
SODIUM SERPL-SCNC: 138 MMOL/L (ref 138–145)
WBC # BLD AUTO: 8.2 K/UL (ref 4.3–11.1)

## 2022-07-12 PROCEDURE — 6370000000 HC RX 637 (ALT 250 FOR IP): Performed by: NURSE PRACTITIONER

## 2022-07-12 PROCEDURE — 83735 ASSAY OF MAGNESIUM: CPT

## 2022-07-12 PROCEDURE — 2580000003 HC RX 258: Performed by: STUDENT IN AN ORGANIZED HEALTH CARE EDUCATION/TRAINING PROGRAM

## 2022-07-12 PROCEDURE — 71275 CT ANGIOGRAPHY CHEST: CPT

## 2022-07-12 PROCEDURE — 93312 ECHO TRANSESOPHAGEAL: CPT | Performed by: INTERNAL MEDICINE

## 2022-07-12 PROCEDURE — 36415 COLL VENOUS BLD VENIPUNCTURE: CPT

## 2022-07-12 PROCEDURE — 6360000004 HC RX CONTRAST MEDICATION: Performed by: INTERNAL MEDICINE

## 2022-07-12 PROCEDURE — 85027 COMPLETE CBC AUTOMATED: CPT

## 2022-07-12 PROCEDURE — 80048 BASIC METABOLIC PNL TOTAL CA: CPT

## 2022-07-12 PROCEDURE — 1100000003 HC PRIVATE W/ TELEMETRY

## 2022-07-12 PROCEDURE — 75574 CT ANGIO HRT W/3D IMAGE: CPT

## 2022-07-12 PROCEDURE — 2580000003 HC RX 258: Performed by: INTERNAL MEDICINE

## 2022-07-12 PROCEDURE — 93320 DOPPLER ECHO COMPLETE: CPT | Performed by: INTERNAL MEDICINE

## 2022-07-12 PROCEDURE — 93325 DOPPLER ECHO COLOR FLOW MAPG: CPT | Performed by: INTERNAL MEDICINE

## 2022-07-12 PROCEDURE — 82962 GLUCOSE BLOOD TEST: CPT

## 2022-07-12 PROCEDURE — 99232 SBSQ HOSP IP/OBS MODERATE 35: CPT | Performed by: INTERNAL MEDICINE

## 2022-07-12 PROCEDURE — 76937 US GUIDE VASCULAR ACCESS: CPT

## 2022-07-12 PROCEDURE — 2140000001 HC CVICU INTERMEDIATE R&B

## 2022-07-12 RX ORDER — CARVEDILOL 3.12 MG/1
3.12 TABLET ORAL 2 TIMES DAILY WITH MEALS
Status: DISCONTINUED | OUTPATIENT
Start: 2022-07-12 | End: 2022-07-13 | Stop reason: HOSPADM

## 2022-07-12 RX ORDER — SODIUM CHLORIDE 0.9 % (FLUSH) 0.9 %
10 SYRINGE (ML) INJECTION
Status: COMPLETED | OUTPATIENT
Start: 2022-07-12 | End: 2022-07-12

## 2022-07-12 RX ORDER — 0.9 % SODIUM CHLORIDE 0.9 %
100 INTRAVENOUS SOLUTION INTRAVENOUS
Status: COMPLETED | OUTPATIENT
Start: 2022-07-12 | End: 2022-07-12

## 2022-07-12 RX ADMIN — SODIUM CHLORIDE, PRESERVATIVE FREE 5 ML: 5 INJECTION INTRAVENOUS at 08:52

## 2022-07-12 RX ADMIN — APIXABAN 5 MG: 5 TABLET, FILM COATED ORAL at 08:51

## 2022-07-12 RX ADMIN — APIXABAN 5 MG: 5 TABLET, FILM COATED ORAL at 21:22

## 2022-07-12 RX ADMIN — ATORVASTATIN CALCIUM 80 MG: 80 TABLET, FILM COATED ORAL at 21:22

## 2022-07-12 RX ADMIN — IOPAMIDOL 100 ML: 755 INJECTION, SOLUTION INTRAVENOUS at 14:17

## 2022-07-12 RX ADMIN — ASPIRIN 81 MG: 81 TABLET, CHEWABLE ORAL at 08:51

## 2022-07-12 RX ADMIN — SODIUM CHLORIDE 100 ML: 9 INJECTION, SOLUTION INTRAVENOUS at 14:17

## 2022-07-12 RX ADMIN — SODIUM CHLORIDE, PRESERVATIVE FREE 5 ML: 5 INJECTION INTRAVENOUS at 17:28

## 2022-07-12 RX ADMIN — SODIUM CHLORIDE, PRESERVATIVE FREE 5 ML: 5 INJECTION INTRAVENOUS at 20:57

## 2022-07-12 RX ADMIN — SODIUM CHLORIDE, PRESERVATIVE FREE 10 ML: 5 INJECTION INTRAVENOUS at 14:17

## 2022-07-12 ASSESSMENT — PAIN SCALES - GENERAL
PAINLEVEL_OUTOF10: 0

## 2022-07-12 NOTE — PROGRESS NOTES
US Guided PIV access    Called for assistance with IV access. Ultrasound was used to find the vein which was compressible and does not have any features of an artery or nerve bundle. Skin was cleaned and disinfected prior to IV puncture. Under real-time ultrasound guidance peripheral access was obtained in the right forearm using 20 G 6 CM Extended Dwell Peripheral IV catheter LOT # [id number] x 1 attempt. Blood return was present and IV flushed without difficulty. IV dressing applied, no immediate complications noted, and patient tolerated the procedure well.     Chaparrita Carreon RN, PCCN, VA-BC

## 2022-07-12 NOTE — PROGRESS NOTES
Late Entry    Spiritual Care Visit, initial visit. Attempted to visit; staff was attending. Visit by Liza Jeffries, Staff .  Jone., Héctor.EDD., B.A.

## 2022-07-12 NOTE — PROGRESS NOTES
[facility] CARDIOLOGY PROGRESS NOTE           7/12/2022 7:17 AM    Admit Date: 7/8/2022      Subjective:   Patient resting comfortably. He has been off Levophed for approximately 1 hour. He denies any chest pain shortness of breath orthopnea PND. ROS:  Cardiovascular:  As noted above    Objective:      Vitals:    07/12/22 0115 07/12/22 0130 07/12/22 0145 07/12/22 0200   BP: 110/66 112/69 (!) 107/59    Pulse: 64 63 64 62   Resp: 20 22 22 16   Temp:  98 °F (36.7 °C)     TempSrc:  Oral     SpO2: 97% 97% 97% 97%   Weight:       Height:           Physical Exam:  General-No Acute Distress  Neck- supple, no JVD  CV- regular rate and 2/6 right upper sternal border  Lung- clear bilaterally  Abd- soft, nontender, nondistended  Ext- no edema bilaterally. Skin- warm and dry    Data Review:   Recent Labs     07/11/22  0708 07/12/22  0609    138   K 3.5 3.8   MG 2.1 1.9   BUN 32* 32*   WBC  --  8.2   HGB  --  13.2*   HCT  --  41.7   PLT  --  141*       Assessment/Plan:     Principal Problem:    Acute on chronic systolic (congestive) heart failure (HCC)  Plan: LVEF 10-50% with severe low-flow low gradient aortic stenosis. Holding carvedilol and Entresto due to hypotension. Discontinue Lasix as patient appears euvolemic. He is status post atrial flutter ablation which will hopefully lead to improved LVEF. We will plan LifeVest prior to discharge. Active Problems:    Acute congestive heart failure ([address]  Plan: See above    Atrial flutter ([address]  Plan: Status post ablation and will resume Eliquis. Hopefully treatment of atrial flutter will lead to improved LVEF    Aortic stenosis  Severe low-flow low gradient aortic stenosis with LVEF 10 to 15%. We will plan TAVR CT scan today. Patient may then follow-up in the office in 4 to 6 weeks with repeat echocardiogram to assess improvement in LVEF. Patient stable to transfer to floor.         Reggie Gallegos MD  7/12/2022 7:17 AM

## 2022-07-12 NOTE — CARE COORDINATION
UPDATE 4:39PM:  MD placed order for lifevest.  This CM faxed referral to [facility] this evening. No additional CM needs at this time. Chart screened by  for discharge planning. No needs identified at this time. Please consult  if any new issues arise. 07/09/22 6886   Service Assessment   Support Systems Family Members   Discharge Planning   Type of Residence House   Living Arrangements Alone   Current Services Prior To Admission None   Potential Assistance Needed N/A   DME Ordered?  No   Potential Assistance Purchasing Medications No   Type of Home Care Services None   Patient expects to be discharged to: [facility]

## 2022-07-13 VITALS
WEIGHT: 247.8 LBS | OXYGEN SATURATION: 95 % | HEIGHT: 69 IN | SYSTOLIC BLOOD PRESSURE: 144 MMHG | TEMPERATURE: 97.9 F | DIASTOLIC BLOOD PRESSURE: 67 MMHG | HEART RATE: 73 BPM | RESPIRATION RATE: 20 BRPM | BODY MASS INDEX: 36.7 KG/M2

## 2022-07-13 LAB
ANION GAP SERPL CALC-SCNC: 6 MMOL/L (ref 7–16)
BUN SERPL-MCNC: 26 MG/DL (ref 8–23)
CALCIUM SERPL-MCNC: 9.5 MG/DL (ref 8.3–10.4)
CHLORIDE SERPL-SCNC: 106 MMOL/L (ref 98–107)
CO2 SERPL-SCNC: 29 MMOL/L (ref 21–32)
CREAT SERPL-MCNC: 0.8 MG/DL (ref 0.8–1.5)
GLUCOSE BLD STRIP.AUTO-MCNC: 136 MG/DL (ref 65–100)
GLUCOSE BLD STRIP.AUTO-MCNC: 141 MG/DL (ref 65–100)
GLUCOSE BLD STRIP.AUTO-MCNC: 148 MG/DL (ref 65–100)
GLUCOSE SERPL-MCNC: 162 MG/DL (ref 65–100)
MAGNESIUM SERPL-MCNC: 2.3 MG/DL (ref 1.8–2.4)
POTASSIUM SERPL-SCNC: 4 MMOL/L (ref 3.5–5.1)
SERVICE CMNT-IMP: ABNORMAL
SODIUM SERPL-SCNC: 141 MMOL/L (ref 136–145)

## 2022-07-13 PROCEDURE — 2580000003 HC RX 258: Performed by: NURSE PRACTITIONER

## 2022-07-13 PROCEDURE — 36415 COLL VENOUS BLD VENIPUNCTURE: CPT

## 2022-07-13 PROCEDURE — 6370000000 HC RX 637 (ALT 250 FOR IP): Performed by: INTERNAL MEDICINE

## 2022-07-13 PROCEDURE — 83735 ASSAY OF MAGNESIUM: CPT

## 2022-07-13 PROCEDURE — 80048 BASIC METABOLIC PNL TOTAL CA: CPT

## 2022-07-13 PROCEDURE — 82962 GLUCOSE BLOOD TEST: CPT

## 2022-07-13 PROCEDURE — 99238 HOSP IP/OBS DSCHRG MGMT 30/<: CPT | Performed by: INTERNAL MEDICINE

## 2022-07-13 PROCEDURE — 6370000000 HC RX 637 (ALT 250 FOR IP): Performed by: NURSE PRACTITIONER

## 2022-07-13 RX ADMIN — SODIUM CHLORIDE, PRESERVATIVE FREE 10 ML: 5 INJECTION INTRAVENOUS at 09:30

## 2022-07-13 RX ADMIN — ASPIRIN 81 MG: 81 TABLET, CHEWABLE ORAL at 09:30

## 2022-07-13 RX ADMIN — SACUBITRIL AND VALSARTAN 1 TABLET: 24; 26 TABLET, FILM COATED ORAL at 16:50

## 2022-07-13 RX ADMIN — APIXABAN 5 MG: 5 TABLET, FILM COATED ORAL at 09:30

## 2022-07-13 NOTE — PLAN OF CARE
Problem: Chronic Conditions and Co-morbidities  Goal: Patient's chronic conditions and co-morbidity symptoms are monitored and maintained or improved  Outcome: Progressing     Problem: Discharge Planning  Goal: Discharge to home or other facility with appropriate resources  Outcome: Progressing     Problem: Safety - Adult  Goal: Free from fall injury  Outcome: Progressing     Problem: ABCDS Injury Assessment  Goal: Absence of physical injury  Outcome: Progressing     Problem: Pain  Goal: Verbalizes/displays adequate comfort level or baseline comfort level  Outcome: Progressing

## 2022-07-13 NOTE — DISCHARGE SUMMARY
Patient seen and examined by me. Agree with above note by physician extender. Andino findings are: Mr. Starla Denver was admitted with wide-complex tachycardia and acute systolic heart failure. Cardiac catheterization with mild to moderate diffuse pattern nonobstructive CAD. Wide-complex tachycardia was determined to be atrial flutter now status post ablation. He has maintained sinus rhythm with wide left bundle branch block. He has low-flow low gradient aortic stenosis. Status post flutter ablation patient is felt well. Volume status is stable. He is stable on optimal medical therapy. He has completed TAVR CT scan. Vitals:    07/13/22 0528 07/13/22 0711 07/13/22 1147 07/13/22 1534   BP:  128/60 114/63 (!) 144/67   Pulse:  77 90 73   Resp:  20 20 20   Temp:  98 °F (36.7 °C) 97.9 °F (36.6 °C) 97.9 °F (36.6 °C)   TempSrc:  Temporal Temporal Temporal   SpO2:  92% 93% 95%   Weight: 247 lb 12.8 oz (112.4 kg)      Height:            General: Patient is alert and oriented, no apparent distress  HEENT: Pupils equal reactive, oropharynx clear  Chest: Clear to auscultation bilaterally  Cardiovascular: S1-S2 regular 2/6 at right upper sternal border  Abdomen: Soft positive bowel sounds  Extremities: Soft no edema with intact distal pulses    Principal Problem:    Acute on chronic systolic (congestive) heart failure (HCC)  Plan: Volume status is stabilized. He is stable on carvedilol and Entresto. Patient has LifeVest in place and educated as to how to wear and use. Recommend follow-up with Dr. Connie Tejada in 2 weeks. Patient should be seen by primary cardiologist in 4 to 6 weeks with echocardiogram prior to visit. We will assess patient's LVEF over the next 1 to 2 months. If LVEF continues to improve we will monitor her aortic stenosis closely. If LVEF does not improve we will consider proceeding with TAVR.   Active Problems:    Acute congestive heart failure ([address]  Plan: See above    Atrial flutter ([address]  Plan: In sinus instructed to advance activities as tolerated limited to fatigue or shortness of breath. Pt is instructed to do no heavy lifting, straining, stooping or squatting for 3 days. Pt is instructed to watch cath site for bleeding/oozing, if seen Pt is instructed to apply firm pressure with clean cloth and call office at [phone]. Pt is instructed to watch for signs of infection which include increasing area of redness around site, fever/hot to touch or purulent drainage. Pt is instructed not to soak in a tub bath for 1 week, but it is okay to shower. Pt is instructed to call office or return to ER for immediate evaluation of any shortness of breath or chest pain not relieved by NTG. Discharge Exam: /63   Pulse 90   Temp 97.9 °F (36.6 °C) (Temporal)   Resp 20   Ht 5' 9\" (1.753 m)   Wt 247 lb 12.8 oz (112.4 kg)   SpO2 93%   BMI 36.59 kg/m²   Pt has been seen by Dr. Melvi Santillan: see his progress note for exam details.     Recent Results (from the past 24 hour(s))   POCT Glucose    Collection Time: 07/12/22  4:27 PM   Result Value Ref Range    POC Glucose 109 (H) 65 - 100 mg/dL    Performed by: Hiro    POCT Glucose    Collection Time: 07/12/22  8:57 PM   Result Value Ref Range    POC Glucose 186 (H) 65 - 100 mg/dL    Performed by: Brady    POCT Glucose    Collection Time: 07/13/22  6:04 AM   Result Value Ref Range    POC Glucose 141 (H) 65 - 100 mg/dL    Performed by: JordanaKIA    Basic Metabolic Panel    Collection Time: 07/13/22  7:06 AM   Result Value Ref Range    Sodium 141 136 - 145 mmol/L    Potassium 4.0 3.5 - 5.1 mmol/L    Chloride 106 98 - 107 mmol/L    CO2 29 21 - 32 mmol/L    Anion Gap 6 (L) 7 - 16 mmol/L    Glucose 162 (H) 65 - 100 mg/dL    BUN 26 (H) 8 - 23 MG/DL    CREATININE 0.80 0.8 - 1.5 MG/DL    GFR African American >60 >60 ml/min/1.73m2    GFR Non- >60 >60 ml/min/1.73m2    Calcium 9.5 8.3 - 10.4 MG/DL   Magnesium    Collection Time: 07/13/22  7:06 AM Result Value Ref Range    Magnesium 2.3 1.8 - 2.4 mg/dL   POCT Glucose    Collection Time: 07/13/22 11:51 AM   Result Value Ref Range    POC Glucose 148 (H) 65 - 100 mg/dL    Performed by: Mik          Patient Instructions:   Current Discharge Medication List        START taking these medications    Details   empagliflozin (JARDIANCE) 10 MG tablet Take 1 tablet by mouth daily  Qty: 30 tablet, Refills: 3      sacubitril-valsartan (ENTRESTO) 24-26 MG per tablet Take 1 tablet by mouth 2 times daily  Qty: 60 tablet, Refills: 3           CONTINUE these medications which have NOT CHANGED    Details   nitroGLYCERIN (NITROSTAT) 0.4 MG SL tablet Place 1 tablet under the tongue every 5 minutes as needed for Chest pain up to max of 3 total doses. If no relief after 1 dose, call 911.   Qty: 25 tablet, Refills: 3      apixaban (ELIQUIS) 5 MG TABS tablet Take 5 mg by mouth 2 times daily      aspirin 81 MG chewable tablet Take 81 mg by mouth      atorvastatin (LIPITOR) 80 MG tablet TAKE 1 TABLET BY MOUTH EVERY DAY      carvedilol (COREG) 12.5 MG tablet Take 12.5 mg by mouth 2 times daily (with meals)      metFORMIN (GLUCOPHAGE) 1000 MG tablet Take 1,000 mg by mouth 2 times daily (with meals)           STOP taking these medications       amLODIPine (NORVASC) 5 MG tablet Comments:   Reason for Stopping:         dapagliflozin (FARXIGA) 10 MG tablet Comments:   Reason for Stopping: changed to Jardiance        irbesartan (AVAPRO) 300 MG tablet Comments:   Reason for Stopping: changed to Malia Martin PA-C  7/13/2022  2:50 PM

## 2022-07-13 NOTE — PROGRESS NOTES
Discharge instructions reviewed with patient. Prescriptions given for sent to pharmacy and med info sheets provided for all new medications. Opportunity for questions provided. patient voiced understanding of all discharge instructions.      IV removed pt sent with life vest

## 2022-07-13 NOTE — CARE COORDINATION
Pt with discharge orders this day. Pt to return home with spouse. [facility] representative fit pt for lifevest this day. No additional CM needs at discharge. 07/09/22 3330   Service Assessment   Support Systems Family Members   Discharge Planning   Type of Residence House   Living Arrangements Alone   Current Services Prior To Admission None   Potential Assistance Needed N/A   DME Ordered?  No   Potential Assistance Purchasing Medications No   Type of Home Care Services None   Patient expects to be discharged to: House   Condition of Participation: Discharge Planning   The Plan for Transition of Care is related to the following treatment goals: Home

## 2022-07-13 NOTE — NURSE NAVIGATOR
CHF teaching completted to pt. . Pass post test: emphasis to report worsening dyspnea, daily WTs, edema , generalized weakness, Cardiac diet/ FR. > Planners @ Bs /home with scale: 1 hr total

## 2022-07-13 NOTE — PLAN OF CARE
Problem: Chronic Conditions and Co-morbidities  Goal: Patient's chronic conditions and co-morbidity symptoms are monitored and maintained or improved  Outcome: Completed  Flowsheets (Taken 7/13/2022 0730)  Care Plan - Patient's Chronic Conditions and Co-Morbidity Symptoms are Monitored and Maintained or Improved: Monitor and assess patient's chronic conditions and comorbid symptoms for stability, deterioration, or improvement     Problem: Discharge Planning  Goal: Discharge to home or other facility with appropriate resources  Outcome: Completed  Flowsheets (Taken 7/13/2022 0730)  Discharge to home or other facility with appropriate resources: Identify barriers to discharge with patient and caregiver     Problem: Safety - Adult  Goal: Free from fall injury  Outcome: Completed  Flowsheets (Taken 7/13/2022 0827)  Free From Fall Injury: Instruct family/caregiver on patient safety     Problem: ABCDS Injury Assessment  Goal: Absence of physical injury  Outcome: Completed     Problem: Pain  Goal: Verbalizes/displays adequate comfort level or baseline comfort level  Outcome: Completed  Flowsheets (Taken 7/13/2022 0730)  Verbalizes/displays adequate comfort level or baseline comfort level: Encourage patient to monitor pain and request assistance     Problem: Discharge Planning  Goal: Discharge to home or other facility with appropriate resources  Outcome: Completed  Flowsheets (Taken 7/13/2022 0730)  Discharge to home or other facility with appropriate resources: Identify barriers to discharge with patient and caregiver     Problem: Safety - Adult  Goal: Free from fall injury  Outcome: Completed  Flowsheets (Taken 7/13/2022 0827)  Free From Fall Injury: Instruct family/caregiver on patient safety

## 2022-07-18 PROCEDURE — 75574 CT ANGIO HRT W/3D IMAGE: CPT | Performed by: INTERNAL MEDICINE

## 2022-08-03 ENCOUNTER — HOSPITAL ENCOUNTER (EMERGENCY)
Age: 72
Discharge: HOME | End: 2022-08-04
Attending: EMERGENCY MEDICINE
Payer: MEDICARE

## 2022-08-03 ENCOUNTER — HOSPITAL ENCOUNTER (EMERGENCY)
Dept: GENERAL RADIOLOGY | Age: 72
Discharge: HOME | End: 2022-08-06
Payer: MEDICARE

## 2022-08-03 DIAGNOSIS — Z95.810 PRESENCE OF EXTERNAL CARDIAC DEFIBRILLATOR: Primary | ICD-10-CM

## 2022-08-03 DIAGNOSIS — I50.9 CHRONIC CONGESTIVE HEART FAILURE, UNSPECIFIED HEART FAILURE TYPE (HCC): ICD-10-CM

## 2022-08-03 PROCEDURE — 80053 COMPREHEN METABOLIC PANEL: CPT

## 2022-08-03 PROCEDURE — 93005 ELECTROCARDIOGRAM TRACING: CPT | Performed by: EMERGENCY MEDICINE

## 2022-08-03 PROCEDURE — 99285 EMERGENCY DEPT VISIT HI MDM: CPT

## 2022-08-03 PROCEDURE — 85027 COMPLETE CBC AUTOMATED: CPT

## 2022-08-03 PROCEDURE — 83735 ASSAY OF MAGNESIUM: CPT

## 2022-08-03 PROCEDURE — 71046 X-RAY EXAM CHEST 2 VIEWS: CPT

## 2022-08-03 PROCEDURE — 84484 ASSAY OF TROPONIN QUANT: CPT

## 2022-08-03 ASSESSMENT — PAIN - FUNCTIONAL ASSESSMENT: PAIN_FUNCTIONAL_ASSESSMENT: NONE - DENIES PAIN

## 2022-08-04 ENCOUNTER — TELEPHONE (OUTPATIENT)
Dept: CARDIOLOGY CLINIC | Age: 72
End: 2022-08-04

## 2022-08-04 VITALS
TEMPERATURE: 98.6 F | OXYGEN SATURATION: 98 % | WEIGHT: 244 LBS | DIASTOLIC BLOOD PRESSURE: 109 MMHG | HEART RATE: 73 BPM | RESPIRATION RATE: 19 BRPM | BODY MASS INDEX: 36.14 KG/M2 | HEIGHT: 69 IN | SYSTOLIC BLOOD PRESSURE: 164 MMHG

## 2022-08-04 LAB
ALBUMIN SERPL-MCNC: 3.7 G/DL (ref 3.2–4.6)
ALBUMIN/GLOB SERPL: 1 {RATIO} (ref 1.2–3.5)
ALP SERPL-CCNC: 76 U/L (ref 50–136)
ALT SERPL-CCNC: 38 U/L (ref 12–65)
ANION GAP SERPL CALC-SCNC: 8 MMOL/L (ref 7–16)
AST SERPL-CCNC: 23 U/L (ref 15–37)
BILIRUB SERPL-MCNC: 0.7 MG/DL (ref 0.2–1.1)
BUN SERPL-MCNC: 15 MG/DL (ref 8–23)
CALCIUM SERPL-MCNC: 8.8 MG/DL (ref 8.3–10.4)
CHLORIDE SERPL-SCNC: 109 MMOL/L (ref 98–107)
CO2 SERPL-SCNC: 24 MMOL/L (ref 21–32)
CREAT SERPL-MCNC: 0.7 MG/DL (ref 0.8–1.5)
EKG ATRIAL RATE: 77 BPM
EKG DIAGNOSIS: NORMAL
EKG P AXIS: 37 DEGREES
EKG P-R INTERVAL: 148 MS
EKG Q-T INTERVAL: 446 MS
EKG QRS DURATION: 154 MS
EKG QTC CALCULATION (BAZETT): 504 MS
EKG R AXIS: 241 DEGREES
EKG T AXIS: 74 DEGREES
EKG VENTRICULAR RATE: 77 BPM
ERYTHROCYTE [DISTWIDTH] IN BLOOD BY AUTOMATED COUNT: 14.7 % (ref 11.9–14.6)
GLOBULIN SER CALC-MCNC: 3.8 G/DL (ref 2.3–3.5)
GLUCOSE SERPL-MCNC: 147 MG/DL (ref 65–100)
HCT VFR BLD AUTO: 46.5 % (ref 41.1–50.3)
HGB BLD-MCNC: 14.8 G/DL (ref 13.6–17.2)
MAGNESIUM SERPL-MCNC: 1.9 MG/DL (ref 1.8–2.4)
MCH RBC QN AUTO: 29.6 PG (ref 26.1–32.9)
MCHC RBC AUTO-ENTMCNC: 31.8 G/DL (ref 31.4–35)
MCV RBC AUTO: 93 FL (ref 79.6–97.8)
NRBC # BLD: 0 K/UL (ref 0–0.2)
PLATELET # BLD AUTO: 137 K/UL (ref 150–450)
PMV BLD AUTO: 11.1 FL (ref 9.4–12.3)
POTASSIUM SERPL-SCNC: 3.9 MMOL/L (ref 3.5–5.1)
PROT SERPL-MCNC: 7.5 G/DL (ref 6.3–8.2)
RBC # BLD AUTO: 5 M/UL (ref 4.23–5.6)
SODIUM SERPL-SCNC: 141 MMOL/L (ref 136–145)
TROPONIN I SERPL HS-MCNC: 23 PG/ML (ref 0–14)
WBC # BLD AUTO: 8.2 K/UL (ref 4.3–11.1)

## 2022-08-04 ASSESSMENT — ENCOUNTER SYMPTOMS
COUGH: 0
VOMITING: 0
NAUSEA: 0
ABDOMINAL PAIN: 0
SHORTNESS OF BREATH: 0

## 2022-08-04 NOTE — TELEPHONE ENCOUNTER
Patient states he has life vest. Patient went to the hospital last night because his defibrillator went off 3 times. Patient states the hospital couldn't find anything wrong or why it was going off. Patient was told in his after visit summary to call  so that is what he is doing. Please call and let patient know if he needs to see  or not. While being on the phone, patient was wondering if he went to the dentist, would any kind of pain killer affect his life vest or him?

## 2022-08-04 NOTE — ED PROVIDER NOTES
[facility] Emergency Department Provider Note                   PCP:                Tami Cedeño MD               Age: 81 y.o. Sex: male       ICD-10-CM    1. Presence of external cardiac defibrillator  Z95.810       2. Chronic congestive heart failure, unspecified heart failure type ([facility] [address]  I50.9           DISPOSITION Decision To Discharge 08/04/2022 01:05:44 AM       MDM  Number of Diagnoses or Management Options  Diagnosis management comments: We will place the patient on the cardiac monitor and monitor the rhythm. Labs were ordered in triage. Troponin is ordered but I do not think this is necessary more interested in seeing his electrolytes. We will see if the representative for the company can come evaluate the device for malfunction. 1:04 AM  Patient has been monitored in the emergency department without any arrhythmias. Labs are unremarkable and stable. No severe electrolyte disturbances. Patient did not bring all the equipment from home so the device is unable to be interrogated at this time. He can do it upon return home and receive instructions from the company at that point. He can also follow-up with [facility] cardiology in the morning. He does not have anybody that can bring those items from home to this ER while he remains here. I believe he is stable for discharge.        Amount and/or Complexity of Data Reviewed  Clinical lab tests: ordered and reviewed  Tests in the medicine section of CPT®: ordered and reviewed  Independent visualization of images, tracings, or specimens: yes    Risk of Complications, Morbidity, and/or Mortality  Presenting problems: low  Diagnostic procedures: low  Management options: low    Patient Progress  Patient progress: stable       Orders Placed This Encounter   Procedures    XR CHEST (2 VW)    CBC    Comprehensive Metabolic Panel    Troponin    Magnesium    Cardiac Monitor    Pulse Oximetry    CARDIAC/RESPIRATORY MONITORING    EKG 12 Lead    Saline Unspecified sleep apnea     no tx at this time. Past Surgical History:   Procedure Laterality Date    ABLATION OF DYSRHYTHMIC FOCUS      CARDIAC PROCEDURE N/A 7/9/2022    LEFT HEART CATH / CORONARY ANGIOGRAPHY performed by Shruthi Gaines MD at [facility] CATH LAB    CAROTID ENDARTERECTOMY Right 07/2013    EP DEVICE PROCEDURE N/A 7/11/2022    ABLATION A-FLUTTER performed by Judd Barrera MD at [facility] CATH LAB    [address]  6/27/2012    Xience to mid LAD        Family History   Problem Relation Age of Onset    Diabetes Mother     Cancer Father     Heart Disease Mother     Hypertension Mother         Social History     Socioeconomic History    Marital status:    Tobacco Use    Smoking status: Never    Smokeless tobacco: Never   Substance and Sexual Activity    Alcohol use: Yes    Drug use: No        Allergies: Patient has no known allergies. Previous Medications    APIXABAN (ELIQUIS) 5 MG TABS TABLET    Take 5 mg by mouth 2 times daily    ASPIRIN 81 MG CHEWABLE TABLET    Take 81 mg by mouth    ATORVASTATIN (LIPITOR) 80 MG TABLET    TAKE 1 TABLET BY MOUTH EVERY DAY    CARVEDILOL (COREG) 12.5 MG TABLET    Take 12.5 mg by mouth 2 times daily (with meals)    EMPAGLIFLOZIN (JARDIANCE) 10 MG TABLET    Take 1 tablet by mouth daily    METFORMIN (GLUCOPHAGE) 1000 MG TABLET    Take 1,000 mg by mouth 2 times daily (with meals)    NITROGLYCERIN (NITROSTAT) 0.4 MG SL TABLET    Place 1 tablet under the tongue every 5 minutes as needed for Chest pain up to max of 3 total doses. If no relief after 1 dose, call 911. SACUBITRIL-VALSARTAN (ENTRESTO) 24-26 MG PER TABLET    Take 1 tablet by mouth 2 times daily        Vitals signs and nursing note reviewed. Patient Vitals for the past 4 hrs:   Temp Pulse Resp BP SpO2   08/03/22 2246 98.6 °F (37 °C) 80 18 (!) 164/109 98 %          Physical Exam  Vitals and nursing note reviewed. Constitutional:       Appearance: Normal appearance. HENT:      Head: Normocephalic and atraumatic. Nose: Nose normal.      Mouth/Throat:      Mouth: Mucous membranes are moist.   Eyes:      Conjunctiva/sclera: Conjunctivae normal.      Pupils: Pupils are equal, round, and reactive to light. Cardiovascular:      Rate and Rhythm: Normal rate and regular rhythm. Pulses: Normal pulses. Heart sounds: Murmur heard. Pulmonary:      Effort: Pulmonary effort is normal.      Breath sounds: Normal breath sounds. Abdominal:      General: There is no distension. Palpations: Abdomen is soft. Tenderness: There is no abdominal tenderness. There is no guarding or rebound. Musculoskeletal:         General: Normal range of motion. Skin:     General: Skin is warm and dry. Neurological:      Mental Status: He is alert and oriented to person, place, and time. Psychiatric:         Behavior: Behavior normal.        Procedures    ED EKG Interpretation  EKG was interpreted in the absence of a cardiologist.    Rate: Rate: Normal  EKG Interpretation: EKG Interpretation: sinus rhythm  ST Segments: Nonspecific ST segments - NO STEMI  Right bundle branch block    Labs Reviewed   CBC - Abnormal; Notable for the following components:       Result Value    RDW 14.7 (*)     Platelets 421 (*)     All other components within normal limits   COMPREHENSIVE METABOLIC PANEL - Abnormal; Notable for the following components:    Chloride 109 (*)     Glucose 147 (*)     Creatinine 0.70 (*)     Globulin 3.8 (*)     Albumin/Globulin Ratio 1.0 (*)     All other components within normal limits   TROPONIN - Abnormal; Notable for the following components:    Troponin, High Sensitivity 23.0 (*)     All other components within normal limits   TROPONIN   MAGNESIUM        XR CHEST (2 VW)   Final Result   No evidence of an acute intrathoracic process. Voice dictation software was used during the making of this note.   This software is not perfect and

## 2022-08-04 NOTE — ED NOTES
I have reviewed discharge instructions with the patient. The patient verbalized understanding. Patient left ED via Discharge Method: ambulatory to Home with self. Opportunity for questions and clarification provided. Patient given 0 scripts. To continue your aftercare when you leave the hospital, you may receive an automated call from our care team to check in on how you are doing. This is a free service and part of our promise to provide the best care and service to meet your aftercare needs.  If you have questions, or wish to unsubscribe from this service please call [phone].   Thank you for Choosing our [facility] Emergency Department. ;tony Foster RN  08/04/22 0284

## 2022-08-04 NOTE — ED TRIAGE NOTES
Pt ambulatory to triage for reports of defibrillator alarm going off 3 times tonight. Pt denies any chest pain or shortness of breath. Pt states he was advised to come here to be safe. Pt a&ox4.

## 2022-08-04 NOTE — TELEPHONE ENCOUNTER
Spoke with [address] Mccabe from [place] [address]. Strips show double counting due to artifact. No therapies were delivered. Patient delayed therapy appropriately. [employer] represent to contact patient for refitting of lifevest to eliminate artifact. Patient had no true events.

## 2022-08-04 NOTE — DISCHARGE INSTRUCTIONS
Have the device interrogated by DEVON upon your return home and return here if there are any problems. Follow-up with [facility] cardiology in the morning. Call if call in the morning for questions concerns and possible follow-up appointment. Return if any new, worsening or concerning symptoms.

## 2022-08-26 ENCOUNTER — OFFICE VISIT (OUTPATIENT)
Dept: CARDIOLOGY CLINIC | Age: 72
End: 2022-08-26
Payer: MEDICARE

## 2022-08-26 VITALS
HEIGHT: 69 IN | BODY MASS INDEX: 36.43 KG/M2 | HEART RATE: 71 BPM | SYSTOLIC BLOOD PRESSURE: 138 MMHG | WEIGHT: 246 LBS | DIASTOLIC BLOOD PRESSURE: 76 MMHG

## 2022-08-26 DIAGNOSIS — I48.3 TYPICAL ATRIAL FLUTTER (HCC): Primary | ICD-10-CM

## 2022-08-26 DIAGNOSIS — I50.20 HFREF (HEART FAILURE WITH REDUCED EJECTION FRACTION) (HCC): ICD-10-CM

## 2022-08-26 DIAGNOSIS — I10 ESSENTIAL HYPERTENSION, BENIGN: ICD-10-CM

## 2022-08-26 PROCEDURE — 1036F TOBACCO NON-USER: CPT | Performed by: INTERNAL MEDICINE

## 2022-08-26 PROCEDURE — 99214 OFFICE O/P EST MOD 30 MIN: CPT | Performed by: INTERNAL MEDICINE

## 2022-08-26 PROCEDURE — 93000 ELECTROCARDIOGRAM COMPLETE: CPT | Performed by: INTERNAL MEDICINE

## 2022-08-26 PROCEDURE — 3017F COLORECTAL CA SCREEN DOC REV: CPT | Performed by: INTERNAL MEDICINE

## 2022-08-26 PROCEDURE — 1123F ACP DISCUSS/DSCN MKR DOCD: CPT | Performed by: INTERNAL MEDICINE

## 2022-08-26 PROCEDURE — G8417 CALC BMI ABV UP PARAM F/U: HCPCS | Performed by: INTERNAL MEDICINE

## 2022-08-26 PROCEDURE — G8427 DOCREV CUR MEDS BY ELIG CLIN: HCPCS | Performed by: INTERNAL MEDICINE

## 2022-08-26 NOTE — PROGRESS NOTES
[address], [address] [place] [address]  PHONE: [phone]  1950    SUBJECTIVE:   Mica Mcgarry is a 81 y.o. male seen for a follow up visit regarding the following:     Chief Complaint   Patient presents with    Follow-Up from [address] [place] [address] abl    Irregular Heart Beat       HPI:    Mica Mcgarry is a very pleasant 81 y.o. male with a past medical and cardiac history significant for HTN, CAD s/p remote PCI, CINDY s/p CEA, HLD, DM admitted with WCT, acutely decompensated CHF (EF 15%), and low flow low gradient severe AS and EP was consulted for [facility]. Pt is now s/p aflutter ablation, feeling well, no chest pain, SOB, syncope. Cardiac PMH: (Old records have been reviewed and summarized below)  TTE (7/8/22): EF 20%, severe AS  Cath (7/8/22):   1. Diffuse moderate nonobstructive coronary artery disease  2. Severely reduced systolic function EF 15 to 20%  3. At least moderate aortic stenosis  TTE (8/24/22): EF 20-25%    Past Medical History, Past Surgical History, Family history, Social History, and Medications were all reviewed with the patient today and updated as necessary. Current Outpatient Medications   Medication Sig Dispense Refill    empagliflozin (JARDIANCE) 10 MG tablet Take 1 tablet by mouth in the morning. 30 tablet 6    sacubitril-valsartan (ENTRESTO) 24-26 MG per tablet Take 1 tablet by mouth 2 times daily 60 tablet 3    nitroGLYCERIN (NITROSTAT) 0.4 MG SL tablet Place 1 tablet under the tongue every 5 minutes as needed for Chest pain up to max of 3 total doses.  If no relief after 1 dose, call 911. 25 tablet 3    apixaban (ELIQUIS) 5 MG TABS tablet Take 5 mg by mouth 2 times daily      aspirin 81 MG chewable tablet Take 81 mg by mouth      atorvastatin (LIPITOR) 80 MG tablet TAKE 1 TABLET BY MOUTH EVERY DAY      carvedilol (COREG) 12.5 MG tablet Take 12.5 mg by mouth 2 times daily (with meals)      metFORMIN (GLUCOPHAGE) 1000 MG tablet Take 1,000 mg by mouth 2 times daily (with meals)       No current facility-administered medications for this visit. No Known Allergies  [unfilled]  Past Medical History:   Diagnosis Date    Atrial fibrillation ([facility] [address] 9/15/2013    Benign neoplasm of pineal gland ([facility] 9/15/2013    CAD (coronary artery disease)     stent x 1     DM (diabetes mellitus) ([facility] 6/26/2012    Essential hypertension, benign 9/15/2013    HLD (hyperlipidemia)     managed with medication     HTN (hypertension)     managed with medication     Hypercholesterolemia     Hypertension 7/12/2013    Irregular heart beat     cardiac ablation corrected    Obesity     BMI 37.5    Other and unspecified hyperlipidemia 9/15/2013    Other psoriasis 9/15/2013    Peripheral vascular disease, unspecified ([facility] 9/15/2013    Psoriasis     Type 2 diabetes mellitus ([facility] 2012    insulin reliant/ Avg FBS 85-90/ no S/S of low BS     Unspecified sleep apnea     no tx at this time.       Past Surgical History:   Procedure Laterality Date    ABLATION OF DYSRHYTHMIC FOCUS      CARDIAC PROCEDURE N/A 7/9/2022    LEFT HEART CATH / CORONARY ANGIOGRAPHY performed by Shruthi Gaines MD at [facility] CATH LAB    CAROTID ENDARTERECTOMY Right 07/2013    EP DEVICE PROCEDURE N/A 7/11/2022    ABLATION A-FLUTTER performed by Judd Barrera MD at [facility] CATH LAB    [address]  6/27/2012    Xience to mid LAD     Family History   Problem Relation Age of Onset    Diabetes Mother     Cancer Father     Heart Disease Mother     Hypertension Mother      Social History     Tobacco Use    Smoking status: Never    Smokeless tobacco: Never   Substance Use Topics    Alcohol use: Yes       PHYSICAL EXAM:    /76   Pulse 71   Ht 5' 9\" (1.753 m)   Wt 246 lb (111.6 kg)   BMI 36.33 kg/m²      Wt Readings from Last 5 Encounters:   08/26/22 246 lb (111.6 kg)   08/24/22 244 lb (110.7 kg)   08/03/22 244 lb (110.7 kg)   07/13/22 247 lb 12.8 oz (112.4 kg)   07/08/22 LBBB pattern and consistent with SVT with aberrant conduction and not VT and Pt is s/p aflutter ablation. 2. Severe AS: being worked up for TAVR, will get back in with Dr. Helen ZELAYA     3. CVA protection: eliquis 5mg Q12H, may consider stopping pending clinical course     4. NICM, EF 20-25%, NYHA class II-III, LBBB QRS duration 164 msec: will repeat echo after 10/8, will likely require BiV ICD implant in the near future, cont OMT, cont life vest    Patient has been instructed and agrees to call our office with any issues or other concerns related to their cardiac condition(s) and/or complaint(s). Return in about 6 weeks (around 10/7/2022). Mariano Dorantes MD, MS  Cardiology/Electrophysiology  8/26/2022  8:31 AM

## 2022-09-02 ENCOUNTER — OFFICE VISIT (OUTPATIENT)
Dept: CARDIOLOGY CLINIC | Age: 72
End: 2022-09-02
Payer: MEDICARE

## 2022-09-02 VITALS
SYSTOLIC BLOOD PRESSURE: 138 MMHG | HEIGHT: 69 IN | WEIGHT: 245 LBS | BODY MASS INDEX: 36.29 KG/M2 | DIASTOLIC BLOOD PRESSURE: 72 MMHG | HEART RATE: 76 BPM

## 2022-09-02 DIAGNOSIS — I50.20 HFREF (HEART FAILURE WITH REDUCED EJECTION FRACTION) (HCC): Primary | ICD-10-CM

## 2022-09-02 DIAGNOSIS — I48.4 ATYPICAL ATRIAL FLUTTER (HCC): ICD-10-CM

## 2022-09-02 DIAGNOSIS — I44.7 LBBB (LEFT BUNDLE BRANCH BLOCK): ICD-10-CM

## 2022-09-02 DIAGNOSIS — I10 ESSENTIAL HYPERTENSION, BENIGN: ICD-10-CM

## 2022-09-02 PROCEDURE — 1036F TOBACCO NON-USER: CPT | Performed by: INTERNAL MEDICINE

## 2022-09-02 PROCEDURE — 3017F COLORECTAL CA SCREEN DOC REV: CPT | Performed by: INTERNAL MEDICINE

## 2022-09-02 PROCEDURE — 99214 OFFICE O/P EST MOD 30 MIN: CPT | Performed by: INTERNAL MEDICINE

## 2022-09-02 PROCEDURE — 1123F ACP DISCUSS/DSCN MKR DOCD: CPT | Performed by: INTERNAL MEDICINE

## 2022-09-02 PROCEDURE — G8428 CUR MEDS NOT DOCUMENT: HCPCS | Performed by: INTERNAL MEDICINE

## 2022-09-02 PROCEDURE — G8417 CALC BMI ABV UP PARAM F/U: HCPCS | Performed by: INTERNAL MEDICINE

## 2022-09-02 ASSESSMENT — ENCOUNTER SYMPTOMS
STRIDOR: 0
COUGH: 0
ABDOMINAL PAIN: 0
NAIL CHANGES: 0
APHONIA: 0
EYE PAIN: 0

## 2022-09-02 NOTE — PROGRESS NOTES
[address]  PHONE: [phone]    SUBJECTIVE:   Alejandra Pena is a 81 y.o. male 1950   seen for a follow up visit regarding the following:     Chief Complaint   Patient presents with    Congestive Heart Failure    Atrial Fibrillation     6 wk follow up         History of present illness: 81 y.o. male presented for follow-up 9/2/22 previously seen by Dr. Latia Geronimo history of wide-complex tachycardia decompensated CHF with EF of 15% low-flow low gradient aortic stenosis. He underwent flutter ablation    Interval Hx  The patient presented for consultation  02/15/2018. Patient with history of coronary artery disease and underwent cardiac catheterization and stenting last in 2012. The patient had additional history of atrial fibrillation and atrial flutter. He presented 02/15/2018 with no changes in symptoms, but has gained a considerable amount of weight over the past several years. He has been treated for this by Dr. John Paul Alejandra, who recommended additional weight loss measures. The patient is attempting to increase his exercise. He currently walks his dogs on a daily basis. He states this is not rigorous physical activity. Additionally, he has a history of right carotid endarterectomy performed by Dr. Sun. Cardiac History:   Cardiac catheterization 2012 - diminished left ventricular systolic function, EF 09%, high grade LAD disease, status post PCI and moderate diffuse disease involving the circumflex and right coronary artery. Atrial flutter ablation. Right sided atrial flutter. The patient is on chronic anticoagulation therapy for atrial fibrillation. History of right carotid endarterectomy.    2022 cath stable CAD   7/8/2022 echocardiogram ejection fraction 20 to 25% aortic valve with severe aortic stenosis mean gradient 44 mmHg     Assessment and Plan:   AS  TAVR plannned CT and cath done  CHF  GDMT indications for ICD following 90 day period if no recovery. Live vest now   Atrial flutter  Status post atrial flutter ablation  Carotid disease. Previous history of carotid endarterectomy. Will need serial imaging. Hypertension  Key CAD CHF Meds            sacubitril-valsartan (ENTRESTO) 24-26 MG per tablet (Taking)    Sig - Route: Take 1 tablet by mouth 2 times daily - Oral    apixaban (ELIQUIS) 5 MG TABS tablet (Taking)    Class: Historical Med    atorvastatin (LIPITOR) 80 MG tablet (Taking)    Class: Historical Med    carvedilol (COREG) 12.5 MG tablet (Taking)    Class: Historical Med              Atrial fibrillation. Current Outpatient Medications   Medication Sig    empagliflozin (JARDIANCE) 10 MG tablet Take 1 tablet by mouth in the morning. sacubitril-valsartan (ENTRESTO) 24-26 MG per tablet Take 1 tablet by mouth 2 times daily    nitroGLYCERIN (NITROSTAT) 0.4 MG SL tablet Place 1 tablet under the tongue every 5 minutes as needed for Chest pain up to max of 3 total doses. If no relief after 1 dose, call 911.    apixaban (ELIQUIS) 5 MG TABS tablet Take 5 mg by mouth 2 times daily    aspirin 81 MG chewable tablet Take 81 mg by mouth    atorvastatin (LIPITOR) 80 MG tablet TAKE 1 TABLET BY MOUTH EVERY DAY    carvedilol (COREG) 12.5 MG tablet Take 12.5 mg by mouth 2 times daily (with meals)    metFORMIN (GLUCOPHAGE) 1000 MG tablet Take 1,000 mg by mouth 2 times daily (with meals)     No current facility-administered medications for this visit. Past Medical History, Past Surgical History, Family history, Social History, and Medications were all reviewed with the patient today and updated as necessary.        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/2013    CAD (coronary artery disease)     stent x 1     DM (diabetes mellitus) ([facility] 6/26/2012    Essential hypertension, benign 9/15/2013    HLD (hyperlipidemia)     managed with medication     HTN (hypertension)     managed with medication     Hypercholesterolemia     Hypertension 7/12/2013    Irregular heart beat     cardiac ablation corrected    Obesity     BMI 37.5    Other and unspecified hyperlipidemia 9/15/2013    Other psoriasis 9/15/2013    Peripheral vascular disease, unspecified ([facility] 9/15/2013    Psoriasis     Type 2 diabetes mellitus ([facility] 2012    insulin reliant/ Avg FBS 85-90/ no S/S of low BS     Unspecified sleep apnea     no tx at this time. Past Surgical History:   Procedure Laterality Date    ABLATION OF DYSRHYTHMIC FOCUS      CARDIAC PROCEDURE N/A 7/9/2022    LEFT HEART CATH / CORONARY ANGIOGRAPHY performed by Ladonna Garza MD at [facility] CATH LAB    CAROTID ENDARTERECTOMY Right 07/2013    EP DEVICE PROCEDURE N/A 7/11/2022    ABLATION A-FLUTTER performed by Bong Campbell MD at [facility] CATH LAB    [address]  6/27/2012    Xience to mid LAD     Family History   Problem Relation Age of Onset    Diabetes Mother     Cancer Father     Heart Disease Mother     Hypertension Mother       Social History     Tobacco Use    Smoking status: Never    Smokeless tobacco: Never   Substance Use Topics    Alcohol use: Yes       ROS:    Review of Systems   Constitutional: Negative for fever. HENT:  Negative for stridor. Eyes:  Negative for pain. Cardiovascular:  Negative for chest pain. Respiratory:  Negative for cough. Endocrine: Negative for cold intolerance. Skin:  Negative for nail changes. Musculoskeletal:  Negative for arthritis. Gastrointestinal:  Negative for abdominal pain. Genitourinary:  Negative for dysuria. Neurological:  Negative for aphonia. Psychiatric/Behavioral:  Negative for altered mental status. Allergic/Immunologic: Negative for hives.          PHYSICAL EXAM:    /72   Pulse 76   Ht 5' 9\" (1.753 m)   Wt 245 lb (111.1 kg)   BMI 36.18 kg/m²        Wt Readings from Last 3 Encounters:   09/02/22 245 lb (111.1 kg)   08/26/22 246 lb (111.6 kg)   08/24/22 244 lb (110.7 kg)     BP Readings from Last 3 Encounters:   09/02/22 138/72   08/26/22 138/76   08/24/22 (!) 152/86         Physical Exam  Vitals reviewed. HENT:      Head: Normocephalic. Right Ear: External ear normal.      Left Ear: External ear normal.      Nose: Nose normal.   Eyes:      General: No scleral icterus. Cardiovascular:      Heart sounds: Murmur heard. Systolic murmur is present. Pulmonary:      Effort: Pulmonary effort is normal.   Abdominal:      General: There is no distension. Musculoskeletal:      Cervical back: Neck supple. Skin:     General: Skin is warm. Neurological:      Mental Status: He is alert. Mental status is at baseline. Medical problems and test results were reviewed with the patient today.            Recent Results (from the past 672 hour(s))   Transthoracic echocardiogram (TTE) complete with contrast, bubble, strain, and 3D PRN    Collection Time: 08/24/22 12:30 PM   Result Value Ref Range    IVSd 1.2 (A) 0.6 - 1.0 cm    LVIDd 6.0 (A) 4.2 - 5.9 cm    LVIDs 4.0 cm    LVOT Diameter 2.1 cm    LVPWd 1.2 (A) 0.6 - 1.0 cm    EF BP 27 (A) 55 - 100 %    LV Ejection Fraction A2C 28 %    LV Ejection Fraction A4C 26 %    LV EDV A2C 220 mL    LV EDV A4C 176 mL    LV EDV  (A) 67 - 155 mL    LV ESV A2C 159 mL    LV ESV A4C 130 mL    LV ESV  (A) 22 - 58 mL    LVOT Peak Gradient 5 mmHg    LVOT Mean Gradient 3 mmHg    LVOT SV 89.3 ml    LVOT Peak Velocity 1.1 m/s    LVOT VTI 25.8 cm    RVIDd 3.0 cm    LA Diameter 4.8 cm    LA Volume A/L 68 mL    LA Volume 2C 63 (A) 18 - 58 mL    LA Volume 4C 62 (A) 18 - 58 mL    LA Volume BP 63 (A) 18 - 58 mL    AV Area by Peak Velocity 1.0 cm2    AV Area by VTI 1.0 cm2    AR .3 millisecond    AR Max Velocity PISA 3.9 m/s    AV Peak Gradient 68 mmHg    AV Mean Gradient 44 mmHg    AV Peak Velocity 4.1 m/s    AV Mean Velocity 3.2 m/s AV VTI 92.1 cm    MV A Velocity 1.11 m/s    MV E Wave Deceleration Time 313.6 ms    MV E Velocity 0.85 m/s    LV E' Lateral Velocity 5 cm/s    LV E' Septal Velocity 4 cm/s    TAPSE 2.0 1.7 cm    Body Surface Area 2.32 m2    Fractional Shortening 2D 33 28 - 44 %    LV ESV Index BP 64 mL/m2    LV EDV Index BP 88 mL/m2    LV ESV Index A4C 58 mL/m2    LV EDV Index A4C 78 mL/m2    LV ESV Index A2C 71 mL/m2    LV EDV Index A2C 98 mL/m2    LVIDd Index 2.67 cm/m2    LVIDs Index 1.78 cm/m2    LV RWT Ratio 0.40     LV Mass 2D 314.0 (A) 88 - 224 g    LV Mass 2D Index 139.6 (A) 49 - 115 g/m2    MV E/A 0.77     E/E' Ratio (Averaged) 19.13     E/E' Lateral 17.00     E/E' Septal 21.25     LA Volume Index BP 28 16 - 34 ml/m2    LA Volume Index A/L 30 16 - 34 mL/m2    LVOT Stroke Volume Index 39.7 mL/m2    LVOT Area 3.5 cm2    LA Volume Index 2C 28 16 - 34 mL/m2    LA Volume Index 4C 28 16 - 34 mL/m2    LA Size Index 2.13 cm/m2    AV Velocity Ratio 0.27     LVOT:AV VTI Index 0.28     RAMOS/BSA VTI 0.4 cm2/m2    RAMOS/BSA Peak Velocity 0.4 cm2/m2    RV Basal Dimension 3.5 cm    RV Mid Dimension 3.1 cm     Lab Results   Component Value Date/Time    CHOL 99 07/09/2022 06:40 AM    HDL 29 07/09/2022 06:40 AM       No results found for any visits on 09/02/22. [facility] was seen today for congestive heart failure and atrial fibrillation. Diagnoses and all orders for this visit:    HFrEF (heart failure with reduced ejection fraction) ([facility])    LBBB (left bundle branch block)    Essential hypertension, benign    Atypical atrial flutter ([address]    Return in about 6 months (around 3/2/2023).        Rosa Damon MD  9/2/2022  1:54 PM

## 2022-09-08 ENCOUNTER — TELEPHONE (OUTPATIENT)
Dept: CARDIOLOGY CLINIC | Age: 72
End: 2022-09-08

## 2022-09-08 NOTE — TELEPHONE ENCOUNTER
----- Message from Naresh Hernandez MD sent at 9/2/2022  2:00 PM EDT -----  Please call patient and ask what palma of farxiga dose he was on. He is on Jardiance now but has 90-day supply [facility]. I told him that he could take them interchangeably.   Just wanted to verify he is not on a superhigh dose cingulotomy

## 2022-09-08 NOTE — TELEPHONE ENCOUNTER
Spoke with patient. Patient is currently taking Farxiga 10mg once daily. Patient states he just finished [place] and will use [place] for 90 days and then resume Jardiance. Thanked patient for the update.

## 2022-09-21 ENCOUNTER — OFFICE VISIT (OUTPATIENT)
Dept: CARDIOLOGY CLINIC | Age: 72
End: 2022-09-21
Payer: MEDICARE

## 2022-09-21 VITALS
HEART RATE: 76 BPM | OXYGEN SATURATION: 97 % | BODY MASS INDEX: 36.82 KG/M2 | DIASTOLIC BLOOD PRESSURE: 70 MMHG | HEIGHT: 69 IN | WEIGHT: 248.6 LBS | SYSTOLIC BLOOD PRESSURE: 128 MMHG

## 2022-09-21 DIAGNOSIS — I10 ESSENTIAL HYPERTENSION, BENIGN: ICD-10-CM

## 2022-09-21 DIAGNOSIS — I35.0 NONRHEUMATIC AORTIC VALVE STENOSIS: Chronic | ICD-10-CM

## 2022-09-21 DIAGNOSIS — I50.20 HFREF (HEART FAILURE WITH REDUCED EJECTION FRACTION) (HCC): Primary | ICD-10-CM

## 2022-09-21 DIAGNOSIS — I25.118 CORONARY ARTERY DISEASE INVOLVING NATIVE CORONARY ARTERY OF NATIVE HEART WITH OTHER FORM OF ANGINA PECTORIS (HCC): ICD-10-CM

## 2022-09-21 DIAGNOSIS — I48.3 TYPICAL ATRIAL FLUTTER (HCC): ICD-10-CM

## 2022-09-21 DIAGNOSIS — E78.5 DYSLIPIDEMIA: ICD-10-CM

## 2022-09-21 PROCEDURE — G8428 CUR MEDS NOT DOCUMENT: HCPCS | Performed by: INTERNAL MEDICINE

## 2022-09-21 PROCEDURE — 99214 OFFICE O/P EST MOD 30 MIN: CPT | Performed by: INTERNAL MEDICINE

## 2022-09-21 PROCEDURE — 3017F COLORECTAL CA SCREEN DOC REV: CPT | Performed by: INTERNAL MEDICINE

## 2022-09-21 PROCEDURE — G8417 CALC BMI ABV UP PARAM F/U: HCPCS | Performed by: INTERNAL MEDICINE

## 2022-09-21 PROCEDURE — 1036F TOBACCO NON-USER: CPT | Performed by: INTERNAL MEDICINE

## 2022-09-21 PROCEDURE — 1123F ACP DISCUSS/DSCN MKR DOCD: CPT | Performed by: INTERNAL MEDICINE

## 2022-09-21 ASSESSMENT — ENCOUNTER SYMPTOMS
ABDOMINAL PAIN: 0
BACK PAIN: 0
SHORTNESS OF BREATH: 1
COUGH: 0

## 2022-09-21 NOTE — PROGRESS NOTES
[facility] CARDIOLOGY  [address] [facility], [address]      22      NAME:  Santos Jeffers  : 1950  MRN: [id number]      SUBJECTIVE:   Santos Jeffers is a 81 y.o. male seen for a follow up visit regarding the following:     Chief Complaint   Patient presents with    Hypertension    Coronary Artery Disease       HPI:   81 y.o. male with recent diagnosis of heart failure with reduced ejection fraction, wide-complex tachycardia that was noted to be atrial flutter now status post ablation, hypertension, dyslipidemia, mild to moderate nonobstructive CAD and severe aortic stenosis. He presents today for TAVR consultation. He has completed TAVR work-up but is awaiting assessment for ICD implantation. He remains active with mild to moderate exertional dyspnea which has been stable. Past Medical History, Past Surgical History, Family history, Social History, and Medications were all reviewed with the patient today and updated as necessary. Current Outpatient Medications   Medication Sig Dispense Refill    empagliflozin (JARDIANCE) 10 MG tablet Take 1 tablet by mouth in the morning. 30 tablet 6    sacubitril-valsartan (ENTRESTO) 24-26 MG per tablet Take 1 tablet by mouth 2 times daily 60 tablet 3    nitroGLYCERIN (NITROSTAT) 0.4 MG SL tablet Place 1 tablet under the tongue every 5 minutes as needed for Chest pain up to max of 3 total doses. If no relief after 1 dose, call 911. 25 tablet 3    apixaban (ELIQUIS) 5 MG TABS tablet Take 5 mg by mouth 2 times daily      aspirin 81 MG chewable tablet Take 81 mg by mouth      atorvastatin (LIPITOR) 80 MG tablet TAKE 1 TABLET BY MOUTH EVERY DAY      carvedilol (COREG) 12.5 MG tablet Take 12.5 mg by mouth 2 times daily (with meals)      metFORMIN (GLUCOPHAGE) 1000 MG tablet Take 1,000 mg by mouth 2 times daily (with meals)       No current facility-administered medications for this visit.      Patient Active Problem List    Diagnosis Date Noted    Acute on chronic systolic (congestive) heart failure ([facility] 07/08/2022     Priority: High    Nonrheumatic aortic valve stenosis 09/21/2022     Priority: Medium    HFrEF (heart failure with reduced ejection fraction) ([address] 08/26/2022     Priority: Medium    Acute congestive heart failure ([address]      Priority: Medium    Other psoriasis 09/15/2013    Essential hypertension, benign 09/15/2013    Carotid artery stenosis 07/11/2013 7/11/13 (Dr. Jalen Tobar carotid endarterectomy with bovine pericardium   patch. Dyslipidemia 06/30/2012    Atrial flutter ([facility] [address] 06/30/2012    CAD (coronary artery disease) 06/30/2012    Psoriasis 06/26/2012      Family History   Problem Relation Age of Onset    Diabetes Mother     Cancer Father     Heart Disease Mother     Hypertension Mother      Social History     Tobacco Use    Smoking status: Never    Smokeless tobacco: Never   Substance Use Topics    Alcohol use: Yes       Review Of Symptoms    Review of Systems   Constitutional: Negative for fever and malaise/fatigue. HENT:  Negative for nosebleeds. Cardiovascular:  Negative for chest pain, dyspnea on exertion and palpitations. Respiratory:  Positive for shortness of breath. Negative for cough. Musculoskeletal:  Negative for back pain, muscle cramps, muscle weakness and myalgias. Gastrointestinal:  Negative for abdominal pain. Neurological:  Negative for dizziness. Physical Exam  Blood pressure 128/70, pulse 76, height 5' 9\" (1.753 m), weight 248 lb 9.6 oz (112.8 kg), SpO2 97 %. Physical Exam  HENT:      Head: Normocephalic and atraumatic. Eyes:      Extraocular Movements: Extraocular movements intact. Cardiovascular:      Rate and Rhythm: Normal rate and regular rhythm. Heart sounds: Murmur heard. Systolic murmur is present. Pulmonary:      Effort: Pulmonary effort is normal.      Breath sounds: Normal breath sounds. Abdominal:      Palpations: Abdomen is soft.    Musculoskeletal: General: Normal range of motion. Skin:     General: Skin is warm and dry. Neurological:      General: No focal deficit present. Mental Status: He is oriented to person, place, and time. Medical problems, medical history and test results were reviewed with the patient today.       Lab Results   Component Value Date    CHOL 99 07/09/2022    CHOL 150 03/09/2020     Lab Results   Component Value Date    TRIG 92 07/09/2022    TRIG 147 03/09/2020     Lab Results   Component Value Date    HDL 29 (L) 07/09/2022    HDL 28 (L) 03/09/2020     Lab Results   Component Value Date    LDLCALC 51.6 07/09/2022    LDLCALC 93 03/09/2020     Lab Results   Component Value Date    LABVLDL 18.4 07/09/2022    LABVLDL 29 03/09/2020     Lab Results   Component Value Date    CHOLHDLRATIO 3.4 07/09/2022        Lab Results   Component Value Date    LABA1C 6.7 (H) 07/09/2022     Lab Results   Component Value Date     07/09/2022        Lab Results   Component Value Date     08/03/2022    K 3.9 08/03/2022     (H) 08/03/2022    CO2 24 08/03/2022    BUN 15 08/03/2022    CREATININE 0.70 (L) 08/03/2022    GLUCOSE 147 (H) 08/03/2022    CALCIUM 8.8 08/03/2022    PROT 7.5 08/03/2022    LABALBU 3.7 08/03/2022    BILITOT 0.7 08/03/2022    ALKPHOS 76 08/03/2022    AST 23 08/03/2022    ALT 38 08/03/2022    LABGLOM >60 08/03/2022    GFRAA >60 08/03/2022    AGRATIO 1.7 03/09/2020    GLOB 3.8 (H) 08/03/2022       Lab Results   Component Value Date/Time     08/03/2022 11:06 PM    K 3.9 08/03/2022 11:06 PM     08/03/2022 11:06 PM    CO2 24 08/03/2022 11:06 PM    BUN 15 08/03/2022 11:06 PM    CREATININE 0.70 08/03/2022 11:06 PM    GLUCOSE 147 08/03/2022 11:06 PM    CALCIUM 8.8 08/03/2022 11:06 PM        Lab Results   Component Value Date    WBC 8.2 08/03/2022    HGB 14.8 08/03/2022    HCT 46.5 08/03/2022    MCV 93.0 08/03/2022     (L) 08/03/2022             ASSESSMENT:    Taurus Bishop was seen today for hypertension and coronary artery disease. Diagnoses and all orders for this visit:    HFrEF (heart failure with reduced ejection fraction) ([address] -this is stable. Continue Entresto, carvedilol, Jardiance. Patient wearing LifeVest.  He is awaiting LVEF reassessment. If LVEF remains reduced will need ICD prior to TAVR. -     Transthoracic echocardiogram (TTE) complete with contrast, bubble, strain, and 3D PRN; Future    Nonrheumatic aortic valve stenosis -mean aortic valve gradient 44 mmHg with ejection fraction 20-25%. Repeat echocardiogram now to reassess LVEF and aortic stenosis. Patient has completed work-up for TAVR. -     Transthoracic echocardiogram (TTE) complete with contrast, bubble, strain, and 3D PRN; Future    Coronary artery disease involving native coronary artery of native heart with other form of angina pectoris ([address] -all to moderate by cardiac catheterization 07/2022. Continue aspirin and atorvastatin    Essential hypertension, benign -well-controlled on medications above. Dyslipidemia -continue atorvastatin    Typical atrial flutter ([address] -patient is stable status post ablation. Currently anticoagulated with Eliquis. Problem List Items Addressed This Visit          Circulatory    Nonrheumatic aortic valve stenosis (Chronic)    Relevant Orders    Transthoracic echocardiogram (TTE) complete with contrast, bubble, strain, and 3D PRN    HFrEF (heart failure with reduced ejection fraction) (HCC) - Primary    Relevant Orders    Transthoracic echocardiogram (TTE) complete with contrast, bubble, strain, and 3D PRN    Essential hypertension, benign    Atrial flutter (HCC)    CAD (coronary artery disease)       Other    Dyslipidemia       There are no discontinued medications. Patient has been instructed and agrees to call our office with any issues or other concerns related to their cardiac condition(s) and/or complaint(s).       Return for Return after testing per Amie Lopez Adore Rivera.        Daniel Cunningham MD  9/21/2022

## 2022-10-14 ENCOUNTER — OFFICE VISIT (OUTPATIENT)
Dept: CARDIOLOGY CLINIC | Age: 72
End: 2022-10-14
Payer: MEDICARE

## 2022-10-14 VITALS
HEIGHT: 69 IN | HEART RATE: 63 BPM | BODY MASS INDEX: 36.91 KG/M2 | SYSTOLIC BLOOD PRESSURE: 110 MMHG | WEIGHT: 249.19 LBS | DIASTOLIC BLOOD PRESSURE: 88 MMHG

## 2022-10-14 DIAGNOSIS — I48.3 TYPICAL ATRIAL FLUTTER (HCC): ICD-10-CM

## 2022-10-14 DIAGNOSIS — I50.20 HFREF (HEART FAILURE WITH REDUCED EJECTION FRACTION) (HCC): Primary | ICD-10-CM

## 2022-10-14 DIAGNOSIS — I35.0 NONRHEUMATIC AORTIC VALVE STENOSIS: ICD-10-CM

## 2022-10-14 PROCEDURE — 1036F TOBACCO NON-USER: CPT | Performed by: INTERNAL MEDICINE

## 2022-10-14 PROCEDURE — 1123F ACP DISCUSS/DSCN MKR DOCD: CPT | Performed by: INTERNAL MEDICINE

## 2022-10-14 PROCEDURE — 93000 ELECTROCARDIOGRAM COMPLETE: CPT | Performed by: INTERNAL MEDICINE

## 2022-10-14 PROCEDURE — 3017F COLORECTAL CA SCREEN DOC REV: CPT | Performed by: INTERNAL MEDICINE

## 2022-10-14 PROCEDURE — G8428 CUR MEDS NOT DOCUMENT: HCPCS | Performed by: INTERNAL MEDICINE

## 2022-10-14 PROCEDURE — G8417 CALC BMI ABV UP PARAM F/U: HCPCS | Performed by: INTERNAL MEDICINE

## 2022-10-14 PROCEDURE — 99214 OFFICE O/P EST MOD 30 MIN: CPT | Performed by: INTERNAL MEDICINE

## 2022-10-14 PROCEDURE — G8484 FLU IMMUNIZE NO ADMIN: HCPCS | Performed by: INTERNAL MEDICINE

## 2022-10-14 NOTE — PROGRESS NOTES
[address], [address] [place] [address]  PHONE: [phone]  1950    SUBJECTIVE:   Rasheeda Barnes is a 81 y.o. male seen for a follow up visit regarding the following:     Chief Complaint   Patient presents with    Irregular Heart Beat    Congestive Heart Failure    Hypertension       HPI:    Rasheeda Barnes is a very pleasant 81 y.o. male with a past medical and cardiac history significant for HTN, CAD s/p remote PCI, CINDY s/p CEA, HLD, DM admitted with WCT, acutely decompensated CHF (EF 15%), and low flow low gradient severe AS and EP was consulted for [facility]. Pt is now s/p aflutter ablation, feeling well, no chest pain, SOB, syncope. He presents back to review echo revealing normal EF. He feels well, no cardiac symptoms. Cardiac PMH: (Old records have been reviewed and summarized below)  TTE (7/8/22): EF 20%, severe AS  Cath (7/8/22):   1. Diffuse moderate nonobstructive coronary artery disease  2. Severely reduced systolic function EF 15 to 20%  3. At least moderate aortic stenosis  TTE (8/24/22): EF 20-25%    Reviewed office note Dr. Lars Chaney 9/21/22    Past Medical History, Past Surgical History, Family history, Social History, and Medications were all reviewed with the patient today and updated as necessary. Current Outpatient Medications   Medication Sig Dispense Refill    empagliflozin (JARDIANCE) 10 MG tablet Take 1 tablet by mouth in the morning. 30 tablet 6    sacubitril-valsartan (ENTRESTO) 24-26 MG per tablet Take 1 tablet by mouth 2 times daily 60 tablet 3    nitroGLYCERIN (NITROSTAT) 0.4 MG SL tablet Place 1 tablet under the tongue every 5 minutes as needed for Chest pain up to max of 3 total doses.  If no relief after 1 dose, call 911. 25 tablet 3    apixaban (ELIQUIS) 5 MG TABS tablet Take 5 mg by mouth 2 times daily      aspirin 81 MG chewable tablet Take 81 mg by mouth      atorvastatin (LIPITOR) 80 MG tablet TAKE 1 TABLET BY MOUTH EVERY DAY carvedilol (COREG) 12.5 MG tablet Take 12.5 mg by mouth 2 times daily (with meals)      metFORMIN (GLUCOPHAGE) 1000 MG tablet Take 1,000 mg by mouth 2 times daily (with meals)       No current facility-administered medications for this visit. No Known Allergies  [unfilled]  Past Medical History:   Diagnosis Date    Atrial fibrillation ([facility] 9/15/2013    Benign ne[facility] of pineal gland [facility] 9/15/2013    CAD (coronary artery disease)     stent x 1     DM (diabetes mellitus) ([facility] 6/26/2012    Essential hypertension, benign 9/15/2013    HLD (hyperlipidemia)     managed with medication     HTN (hypertension)     managed with medication     Hypercholesterolemia     Hypertension 7/12/2013    Irregular heart beat     cardiac ablation corrected    Obesity     BMI 37.5    Other and unspecified hyperlipidemia 9/15/2013    Other psoriasis 9/15/2013    Peripheral vascular disease, unspecified ([facility] 9/15/2013    Psoriasis     Type 2 diabetes mellitus ([facility] 2012    insulin reliant/ Avg FBS 85-90/ no S/S of low BS     Unspecified sleep apnea     no tx at this time.       Past Surgical History:   Procedure Laterality Date    ABLATION OF DYSRHYTHMIC FOCUS      CARDIAC PROCEDURE N/A 7/9/2022    LEFT HEART CATH / CORONARY ANGIOGRAPHY performed by Daniel Fall MD at [facility] CATH LAB    CAROTID ENDARTERECTOMY Right 07/2013    EP DEVICE PROCEDURE N/A 7/11/2022    ABLATION A-FLUTTER performed by Ángel Nicholas MD at [address] [place] [address] CATH LAB    [address]  6/27/2012    Xience to mid LAD     Family History   Problem Relation Age of Onset    Diabetes Mother     Cancer Father     Heart Disease Mother     Hypertension Mother      Social History     Tobacco Use    Smoking status: Never    Smokeless tobacco: Never   Substance Use Topics    Alcohol use: Yes       PHYSICAL EXAM:    Ht 5' 9\" (1.753 m)   Wt 249 lb 3 oz (113 kg)   BMI 36.80 kg/m²      Wt Readings from Last 5 Encounters:   10/14/22 249 lb 3 oz (113 kg)   10/10/22 248 lb (112.5 kg)   09/21/22 248 lb 9.6 oz (112.8 kg)   09/02/22 245 lb (111.1 kg)   08/26/22 246 lb (111.6 kg)       BP Readings from Last 5 Encounters:   10/10/22 (!) 162/96   09/21/22 128/70   09/02/22 138/72   08/26/22 138/76   08/24/22 (!) 152/86       General appearance: Alert, well appearing, and in no distress   CV: RRR, no M/R/G, no JVD, normal distal pulses, no lower extremity edema  Pulm: Clear to auscultation, no wheezes, no crackles, no accessory muscle use  GI: Soft, nontender, nondistended  Neuro: Alert and oriented    Medical problems and test results were reviewed with the patient today. Lab Results   Component Value Date/Time    K 3.9 08/03/2022 11:06 PM     Creatinine   Date Value Ref Range Status   08/03/2022 0.70 (L) 0.8 - 1.5 MG/DL Final     Lab Results   Component Value Date/Time    HGBPOC 14.5 03/09/2020 10:58 AM    HGB 14.8 08/03/2022 11:06 PM     No results found for: INR, PTMR, PT1    Lab Results   Component Value Date    WBC 8.2 08/03/2022    HGB 14.8 08/03/2022    HCT 46.5 08/03/2022    MCV 93.0 08/03/2022     (L) 08/03/2022      Lab Results   Component Value Date/Time     08/03/2022 11:06 PM    K 3.9 08/03/2022 11:06 PM     08/03/2022 11:06 PM    CO2 24 08/03/2022 11:06 PM    BUN 15 08/03/2022 11:06 PM    CREATININE 0.70 08/03/2022 11:06 PM    GLUCOSE 147 08/03/2022 11:06 PM    CALCIUM 8.8 08/03/2022 11:06 PM         EKG: (EKG has been independently visualized by me with interpretation below)  Sinus  Rhythm  - occasional PAC     # PACs = 1.  -Left bundle branch block and left axis. QRS duration 164 msec    Bharati Frazier was seen today for irregular heart beat, congestive heart failure and hypertension. Diagnoses and all orders for this visit:    HFrEF (heart failure with reduced ejection fraction) ([facility])  -     EKG 12 lead    Nonrheumatic aortic valve stenosis  -     EKG 12 lead    Atrial flutter ([facility])  -     EKG 12 lead      ASSESSMENT and PLAN  1. WCT, atypical atrial flutter: 70yo with underlying LBBB and EKGs consistent with atypical atrial flutter and episodes of very rapid HR with consistent LBBB pattern and consistent with SVT with aberrant conduction and not VT and Pt is s/p aflutter ablation. 2. Severe AS: being worked up for TAVR, will get back in with Dr. Tati Powell ASAP     3. CVA protection: eliquis 5mg Q12H     4. NICM, EF 20-25%, NYHA class II-III, LBBB QRS duration 164 msec: repeat echo now with normal EF, cont OMT, recommend TAVR as being worked up by Dr. Tati Powell, Pt is definitely at higher risk for requiring PPM post TAVR given wide LBBB with QRS duration > 160 msec with associated LAD    Patient has been instructed and agrees to call our office with any issues or other concerns related to their cardiac condition(s) and/or complaint(s). No follow-ups on file. Brendon Cranker Lovely Reamer MD, MS  Cardiology/Electrophysiology  10/14/2022  9:17 AM

## 2022-10-25 ENCOUNTER — TELEPHONE (OUTPATIENT)
Dept: CASE MANAGEMENT | Age: 72
End: 2022-10-25

## 2022-10-25 NOTE — TELEPHONE ENCOUNTER
Spoke to pt/family regarding plans for upcoming pre admission testing and TAVR date, vu.    Preadmission Testing for TAVR  Name: Long   When: November 7, 2022  Time: Arrive no later than 7:30 am  Where: ([facility] [address]  Instructions:    Hold Eliquis prior to TAVR with last dose on 11/5 pm     Hold Jardiance and Metformin on 11/8 am, hold metformin after 11/7 am      Eat breakfast before arrival on 11/7      Take your morning medications before you arrive. Do not take any vitamins or supplements. Bring your medications in the bottles or a list with the dosages so we can verify them      Bring someone with you to the visit if available      Bring your COVID vaccine card, insurance cards, 's license, Health Care Power [facility] or Living Will. Plan for a 2hour visit  At this appointment you will talk to anesthesia, you will have non fasting labs drawn, chest X-ray completed, EKG, and a breathing test (pulmonary function test-if not completed at the CT appt) completed. Transcatheter Aortic Valve Replacement (TAVR)   When: November 8  Arrival time: Someone from preop will call you on Monday, the day prior to your procedure, around lunch to let you know exactly what time to be at the hospital on Tuesday. Where: [address]   On Tuesday, you will come to the hospital ([address] at the front entrance (the statue of [place] [address]), check in at registration on the left just inside the doors, and they will direct you where to go. Plan to stay in the hospital at least 1 night, maybe 2.    Call Marshall Richardson for any questions [phone]

## 2022-10-31 ENCOUNTER — PREP FOR PROCEDURE (OUTPATIENT)
Dept: CASE MANAGEMENT | Age: 72
End: 2022-10-31

## 2022-10-31 DIAGNOSIS — I35.0 NONRHEUMATIC AORTIC VALVE STENOSIS: Primary | ICD-10-CM

## 2022-11-02 ENCOUNTER — PREP FOR PROCEDURE (OUTPATIENT)
Dept: CARDIOTHORACIC SURGERY | Age: 72
End: 2022-11-02

## 2022-11-02 DIAGNOSIS — I35.0 SEVERE AORTIC STENOSIS: Primary | ICD-10-CM

## 2022-11-02 DIAGNOSIS — Z01.818 PRE-OP TESTING: ICD-10-CM

## 2022-11-07 ENCOUNTER — HOSPITAL ENCOUNTER (OUTPATIENT)
Dept: PREADMISSION TESTING | Age: 72
Discharge: HOME | DRG: 267 | End: 2022-11-10
Payer: MEDICARE

## 2022-11-07 ENCOUNTER — HOSPITAL ENCOUNTER (OUTPATIENT)
Dept: GENERAL RADIOLOGY | Age: 72
Discharge: HOME | DRG: 267 | End: 2022-11-10
Payer: MEDICARE

## 2022-11-07 ENCOUNTER — ANESTHESIA EVENT (OUTPATIENT)
Dept: SURGERY | Age: 72
DRG: 267 | End: 2022-11-07
Payer: MEDICARE

## 2022-11-07 VITALS
TEMPERATURE: 97.1 F | DIASTOLIC BLOOD PRESSURE: 82 MMHG | OXYGEN SATURATION: 94 % | HEART RATE: 72 BPM | RESPIRATION RATE: 16 BRPM | WEIGHT: 249.1 LBS | SYSTOLIC BLOOD PRESSURE: 158 MMHG | BODY MASS INDEX: 36.89 KG/M2 | HEIGHT: 69 IN

## 2022-11-07 DIAGNOSIS — Z01.818 PRE-OP TESTING: ICD-10-CM

## 2022-11-07 DIAGNOSIS — I35.0 SEVERE AORTIC STENOSIS: ICD-10-CM

## 2022-11-07 LAB
ALBUMIN SERPL-MCNC: 3.7 G/DL (ref 3.2–4.6)
ALBUMIN/GLOB SERPL: 0.9 {RATIO} (ref 0.4–1.6)
ALP SERPL-CCNC: 74 U/L (ref 50–136)
ALT SERPL-CCNC: 27 U/L (ref 12–65)
ANION GAP SERPL CALC-SCNC: 5 MMOL/L (ref 2–11)
APPEARANCE UR: CLEAR
AST SERPL-CCNC: 19 U/L (ref 15–37)
BACTERIA SPEC CULT: NORMAL
BACTERIA URNS QL MICRO: NEGATIVE /HPF
BILIRUB SERPL-MCNC: 0.8 MG/DL (ref 0.2–1.1)
BILIRUB UR QL: NEGATIVE
BUN SERPL-MCNC: 15 MG/DL (ref 8–23)
CALCIUM SERPL-MCNC: 9.8 MG/DL (ref 8.3–10.4)
CASTS URNS QL MICRO: ABNORMAL /LPF
CHLORIDE SERPL-SCNC: 108 MMOL/L (ref 101–110)
CO2 SERPL-SCNC: 27 MMOL/L (ref 21–32)
COLOR UR: ABNORMAL
CREAT SERPL-MCNC: 0.66 MG/DL (ref 0.8–1.5)
EKG ATRIAL RATE: 67 BPM
EKG DIAGNOSIS: NORMAL
EKG P AXIS: 34 DEGREES
EKG P-R INTERVAL: 194 MS
EKG Q-T INTERVAL: 470 MS
EKG QRS DURATION: 160 MS
EKG QTC CALCULATION (BAZETT): 496 MS
EKG R AXIS: -52 DEGREES
EKG T AXIS: 102 DEGREES
EKG VENTRICULAR RATE: 67 BPM
EPI CELLS #/AREA URNS HPF: ABNORMAL /HPF
ERYTHROCYTE [DISTWIDTH] IN BLOOD BY AUTOMATED COUNT: 15.3 % (ref 11.9–14.6)
EST. AVERAGE GLUCOSE BLD GHB EST-MCNC: 137 MG/DL
GLOBULIN SER CALC-MCNC: 4.2 G/DL (ref 2.8–4.5)
GLUCOSE BLD STRIP.AUTO-MCNC: 101 MG/DL (ref 65–100)
GLUCOSE SERPL-MCNC: 105 MG/DL (ref 65–100)
GLUCOSE UR STRIP.AUTO-MCNC: >1000 MG/DL
HBA1C MFR BLD: 6.4 % (ref 4.8–5.6)
HCT VFR BLD AUTO: 46.4 % (ref 41.1–50.3)
HGB BLD-MCNC: 15.1 G/DL (ref 13.6–17.2)
HGB UR QL STRIP: NEGATIVE
HISTORY CHECK: NORMAL
INR PPP: 1.1
KETONES UR QL STRIP.AUTO: NEGATIVE MG/DL
LEUKOCYTE ESTERASE UR QL STRIP.AUTO: NEGATIVE
MAGNESIUM SERPL-MCNC: 2.1 MG/DL (ref 1.8–2.4)
MCH RBC QN AUTO: 29 PG (ref 26.1–32.9)
MCHC RBC AUTO-ENTMCNC: 32.5 G/DL (ref 31.4–35)
MCV RBC AUTO: 89.1 FL (ref 82–102)
NITRITE UR QL STRIP.AUTO: NEGATIVE
NRBC # BLD: 0 K/UL (ref 0–0.2)
PH UR STRIP: 5 [PH] (ref 5–9)
PLATELET # BLD AUTO: 172 K/UL (ref 150–450)
PMV BLD AUTO: 10.7 FL (ref 9.4–12.3)
POTASSIUM SERPL-SCNC: 4.3 MMOL/L (ref 3.5–5.1)
PROT SERPL-MCNC: 7.9 G/DL (ref 6.3–8.2)
PROT UR STRIP-MCNC: ABNORMAL MG/DL
PROTHROMBIN TIME: 14.1 SEC (ref 12.6–14.3)
RBC # BLD AUTO: 5.21 M/UL (ref 4.23–5.6)
RBC #/AREA URNS HPF: ABNORMAL /HPF
SERVICE CMNT-IMP: ABNORMAL
SERVICE CMNT-IMP: NORMAL
SODIUM SERPL-SCNC: 140 MMOL/L (ref 133–143)
SP GR UR REFRACTOMETRY: 1.03 (ref 1–1.02)
UROBILINOGEN UR QL STRIP.AUTO: 0.2 EU/DL (ref 0.2–1)
WBC # BLD AUTO: 8.4 K/UL (ref 4.3–11.1)
WBC URNS QL MICRO: ABNORMAL /HPF

## 2022-11-07 PROCEDURE — 85027 COMPLETE CBC AUTOMATED: CPT

## 2022-11-07 PROCEDURE — 87641 MR-STAPH DNA AMP PROBE: CPT

## 2022-11-07 PROCEDURE — 83735 ASSAY OF MAGNESIUM: CPT

## 2022-11-07 PROCEDURE — 86923 COMPATIBILITY TEST ELECTRIC: CPT

## 2022-11-07 PROCEDURE — 93005 ELECTROCARDIOGRAM TRACING: CPT

## 2022-11-07 PROCEDURE — 86901 BLOOD TYPING SEROLOGIC RH(D): CPT

## 2022-11-07 PROCEDURE — 71046 X-RAY EXAM CHEST 2 VIEWS: CPT

## 2022-11-07 PROCEDURE — 94010 BREATHING CAPACITY TEST: CPT

## 2022-11-07 PROCEDURE — 82962 GLUCOSE BLOOD TEST: CPT

## 2022-11-07 PROCEDURE — 81003 URINALYSIS AUTO W/O SCOPE: CPT

## 2022-11-07 PROCEDURE — 83036 HEMOGLOBIN GLYCOSYLATED A1C: CPT

## 2022-11-07 PROCEDURE — 87086 URINE CULTURE/COLONY COUNT: CPT

## 2022-11-07 PROCEDURE — 80053 COMPREHEN METABOLIC PANEL: CPT

## 2022-11-07 PROCEDURE — 85610 PROTHROMBIN TIME: CPT

## 2022-11-07 NOTE — PERIOP NOTE
Latest Reference Range & Units 11/7/22 08:39   Sodium 133 - 143 mmol/L 140   Potassium 3.5 - 5.1 mmol/L 4.3   Chloride 101 - 110 mmol/L 108   CO2 21 - 32 mmol/L 27   BUN,BUNPL 8 - 23 MG/DL 15   Creatinine 0.8 - 1.5 MG/DL 0.66 (L)   Anion Gap 2 - 11 mmol/L 5   Est, Glom Filt Rate >60 ml/min/1.73m2 >60   Magnesium 1.8 - 2.4 mg/dL 2.1   Glucose, Random 65 - 100 mg/dL 105 (H)   CALCIUM, SERUM, [id number] 8.3 - 10.4 MG/DL 9.8   ALBUMIN/GLOBULIN RATIO 0.4 - 1.6   0.9   Total Protein 6.3 - 8.2 g/dL 7.9   Albumin 3.2 - 4.6 g/dL 3.7   Globulin 2.8 - 4.5 g/dL 4.2   Alk Phosphatase 50 - 136 U/L 74   ALT 12 - 65 U/L 27   AST 15 - 37 U/L 19   Bilirubin 0.2 - 1.1 MG/DL 0.8   Hemoglobin A1C 4.8 - 5.6 % 6.4 (H)   eAG (mg/dL) mg/dL 137   WBC 4.3 - 11.1 K/uL 8.4   RBC 4.23 - 5.6 M/uL 5.21   Hemoglobin Quant 13.6 - 17.2 g/dL 15.1   Hematocrit 41.1 - 50.3 % 46.4   MCV 82.0 - 102.0 FL 89.1   MCH 26.1 - 32.9 PG 29.0   MCHC 31.4 - 35.0 g/dL 32.5   MPV 9.4 - 12.3 FL 10.7   RDW 11.9 - 14.6 % 15.3 (H)   Platelet Count 023 - 450 K/uL 172   Nucleated Red Blood Cells 0.0 - 0.2 K/uL 0.00   Prothrombin Time 12.6 - 14.3 sec 14.1   INR -   1.1   ABO Rh  A NEGATIVE   Antibody Screen  NEG   Crossmatch expiration date  11/10/2022,2359   MSSA/MRSA SCREEN BY PCR  Rpt   Special Requests -   NO SPECIAL REQUESTS      Latest Reference Range & Units 11/7/22 08:37   CULTURE, URINE  Rpt   Color, UA -   YELLOW/STRAW   Glucose, UA mg/dL >1000   Bilirubin, Urine NEG   Negative   Ketones, Urine NEG mg/dL Negative   Specific Gravity, UA 1.001 - 1.023   1.033 (H)   Blood, Urine NEG   Negative   Protein, UA NEG mg/dL TRACE !    Urobilinogen, Urine 0.2 - 1.0 EU/dL 0.2   Nitrite, Urine NEG   Negative   Leukocyte Esterase, Urine NEG   Negative   Appearance -   CLEAR   pH, Urine 5.0 - 9.0   5.0   Casts 0 /lpf 0-2   WBC, UA 0 /hpf 0-4   RBC, UA 0 /hpf 0-5   Epithelial Cells, UA 0 /hpf 0-5   Bacteria, UA 0 /hpf Negative   (H): Data is abnormally high  !: Data is abnormal  Rpt: View report in Results Review for more information                PA LATERAL CHEST  11/7/2022 10:12 AM        HISTORY:  Nonrheumatic aortic (valve) stenosis  ;       COMPARISON: August 3, 2022       FINDINGS:  The heart size is enlarged. There is no lobar consolidation, pleural   effusions or pulmonary edema. Impression   No consolidation.

## 2022-11-07 NOTE — PERIOP NOTE
Labs reviewed. Hgb A1c: 6.4, UA with trace of protein, CXR negative, and MRSA/MSSA negative. All labs routed to surgeons office. Urine culture with pending results- noted in preop huddle.

## 2022-11-07 NOTE — PERIOP NOTE
Directly informed patient and or family member of pre op arrival time 0700 on 11/8. All questions answered. Pre op instructions reviewed. Left contact information for any additional questions or needs.

## 2022-11-07 NOTE — PERIOP NOTE
PLEASE CONTINUE TAKING ALL PRESCRIPTION MEDICATIONS UP TO THE DAY OF SURGERY UNLESS OTHERWISE DIRECTED BELOW. DISCONTINUE all vitamins and supplements 7 days prior to surgery. DISCONTINUE Non-Steriodal Anti-Inflammatory (NSAIDS) such as Advil and Aleve 5 days prior to surgery. Home Medications to take  the day of surgery      Aspirin     Carvedilol (Coreg)     Sacubitril-Valsartan (Entresto)     Home Medications   to Hold     Apixaban (Eliquis)        Comments     ADELAIDA-HEX shower the night before surgery and the morning of surgery. Please do not bring home medications with you on the day of surgery unless otherwise directed by your nurse. If you are instructed to bring home medications, please give them to your nurse as they will be administered by the nursing staff. If you have any questions, please call [address] [place] [address] [phone] or [address] [place] [address] [phone]. A copy of this note was provided to the patient for reference. How to Use Your Incentive Spirometer       About Your Incentive Spirometer  An incentive spirometer is a device that will expand your lungs by helping you to breathe more deeply and fully. The parts of your incentive spirometer are labeled in Figure 1. Using your incentive spirometer  When you're using your incentive spirometer, make sure to breathe through your mouth. If you breathe through your nose, the incentive spirometer won't work properly. You can hold your nose if you have trouble. DO NOT BLOW INTO THE DEVICE. If you feel dizzy at any time, stop and rest. Try again at a later time. Sit upright in a chair or in bed. Hold the incentive spirometer at eye level. Put the mouthpiece in your mouth and close your lips tightly around it. Slowly breathe out (exhale) completely. Breathe in (inhale) slowly through your mouth as deeply as you can. As you take the breath, you will see the piston rise inside the large column.  While the piston rises, the indicator on the right should move upwards. It should stay in between the 2 arrows (see Figure 1). Try to get the piston as high as you can, while keeping the indicator between the arrows. If the indicator doesn't stay between the arrows, you're breathing either too fast or too slow. When you get it as high as you can, hold your breath for 10 seconds, or as long as possible. While you're holding your breath, the piston will slowly fall to the base of the spirometer. Once the piston reaches the bottom of the spirometer, breathe out slowly through your mouth. Rest for a few seconds. Repeat 10 times. Try to get the piston to the same level with each breath. After each set of 10 breaths, try to cough as coughing will help loosen or clear any mucus in your lungs. Put the marker at the level the piston reached on your incentive spirometer. This will be your goal next time. Repeat these steps every hour that you're awake.   Cover the mouthpiece of the incentive spirometer when you aren't using it

## 2022-11-07 NOTE — PERIOP NOTE
Patient verified name and . Patient provided medical/health information and PTA medications to the best of their ability. TYPE  CASE: 3  Order for consent not  found in EHR and  patient verifies procedure. Labs per surgeon: CBC,CMP, Hgb A1c,Mg, PT, UA, UA CX, EKG, MRSA, CXR, MRSA, CXR, T/C; results pending. Labs per anesthesia protocol: POC glucose; results   EKG:  EKG 11.7.22    MRSA/MSSA swab collected; pharmacy to review and dose antibiotic as appropriate. Patient provided with and instructed on educational handouts including Heart Guide to Surgery, blood transfusions, pain management, central line infection prevention, being on a ventilator and hand hygiene for the family and community, and [facility] brochure. Instructed that family must be present in building at all times. Incentive spirometry completed. FEV1 completed as ordered. Instructed on chest-xray procedure. Instructed on type and cross match procedure and not to remove Blue blood bank bracelet- [id number]. Surgical skin cleanser provided and instructions given per hospital policy. Original medication prescription bottles were not visualized during patient appointment. Patient teach back successful and patient demonstrates knowledge of instruction.

## 2022-11-08 ENCOUNTER — HOSPITAL ENCOUNTER (INPATIENT)
Age: 72
LOS: 1 days | Discharge: HOME | DRG: 267 | End: 2022-11-09
Attending: INTERNAL MEDICINE | Admitting: INTERNAL MEDICINE
Payer: MEDICARE

## 2022-11-08 ENCOUNTER — APPOINTMENT (OUTPATIENT)
Dept: NON INVASIVE DIAGNOSTICS | Age: 72
DRG: 267 | End: 2022-11-08
Attending: INTERNAL MEDICINE
Payer: MEDICARE

## 2022-11-08 ENCOUNTER — ANESTHESIA (OUTPATIENT)
Dept: SURGERY | Age: 72
DRG: 267 | End: 2022-11-08
Payer: MEDICARE

## 2022-11-08 DIAGNOSIS — I35.0 AS (AORTIC STENOSIS): ICD-10-CM

## 2022-11-08 DIAGNOSIS — Z95.2 S/P TAVR (TRANSCATHETER AORTIC VALVE REPLACEMENT): Primary | Chronic | ICD-10-CM

## 2022-11-08 DIAGNOSIS — I35.0 AORTIC VALVE STENOSIS, ETIOLOGY OF CARDIAC VALVE DISEASE UNSPECIFIED: ICD-10-CM

## 2022-11-08 LAB
ACT AVERAGE - AAVG: 288 SEC
BASE DEFICIT BLD-SCNC: 0.5 MMOL/L
BASE EXCESS BLD CALC-SCNC: 0.1 MMOL/L
BASE EXCESS BLD CALC-SCNC: 0.3 MMOL/L
BASELINE ACT: 168 SEC
CA-I BLD-MCNC: 1.18 MMOL/L (ref 1.12–1.32)
CA-I BLD-MCNC: 1.18 MMOL/L (ref 1.12–1.32)
CA-I BLD-MCNC: 1.24 MMOL/L (ref 1.12–1.32)
CO2 BLD-SCNC: 26 MMOL/L (ref 13–23)
CO2 BLD-SCNC: 27 MMOL/L (ref 13–23)
CO2 BLD-SCNC: 27 MMOL/L (ref 13–23)
ECHO AO ROOT DIAM: 3.4 CM
ECHO AO ROOT INDEX: 1.5 CM/M2
ECHO AV ACCELERATION TIME: 74.22 MS
ECHO AV AREA PEAK VELOCITY: 1.7 CM2
ECHO AV AREA VTI: 1.8 CM2
ECHO AV AREA/BSA PEAK VELOCITY: 0.8 CM2/M2
ECHO AV AREA/BSA VTI: 0.8 CM2/M2
ECHO AV MEAN GRADIENT: 9 MMHG
ECHO AV MEAN VELOCITY: 1.4 M/S
ECHO AV PEAK GRADIENT: 19 MMHG
ECHO AV PEAK VELOCITY: 2.2 M/S
ECHO AV VELOCITY RATIO: 0.45
ECHO AV VTI: 47.6 CM
ECHO BSA: 2.33 M2
ECHO LV EDV A2C: 84 ML
ECHO LV EDV A4C: 94 ML
ECHO LV EDV BP: 90 ML (ref 67–155)
ECHO LV EDV INDEX A4C: 42 ML/M2
ECHO LV EDV INDEX BP: 40 ML/M2
ECHO LV EDV NDEX A2C: 37 ML/M2
ECHO LV EJECTION FRACTION A2C: 65 %
ECHO LV EJECTION FRACTION A4C: 54 %
ECHO LV EJECTION FRACTION BIPLANE: 61 % (ref 55–100)
ECHO LV ESV A2C: 29 ML
ECHO LV ESV A4C: 43 ML
ECHO LV ESV BP: 35 ML (ref 22–58)
ECHO LV ESV INDEX A2C: 13 ML/M2
ECHO LV ESV INDEX A4C: 19 ML/M2
ECHO LV ESV INDEX BP: 15 ML/M2
ECHO LV FRACTIONAL SHORTENING: 30 % (ref 28–44)
ECHO LV INTERNAL DIMENSION DIASTOLE INDEX: 2.39 CM/M2
ECHO LV INTERNAL DIMENSION DIASTOLIC: 5.4 CM (ref 4.2–5.9)
ECHO LV INTERNAL DIMENSION SYSTOLIC INDEX: 1.68 CM/M2
ECHO LV INTERNAL DIMENSION SYSTOLIC: 3.8 CM
ECHO LV IVSD: 1.5 CM (ref 0.6–1)
ECHO LV MASS 2D: 328.3 G (ref 88–224)
ECHO LV MASS INDEX 2D: 145.3 G/M2 (ref 49–115)
ECHO LV POSTERIOR WALL DIASTOLIC: 1.3 CM (ref 0.6–1)
ECHO LV RELATIVE WALL THICKNESS RATIO: 0.48
ECHO LVOT AREA: 3.5 CM2
ECHO LVOT AV VTI INDEX: 0.49
ECHO LVOT DIAM: 2.1 CM
ECHO LVOT MEAN GRADIENT: 2 MMHG
ECHO LVOT PEAK GRADIENT: 4 MMHG
ECHO LVOT PEAK VELOCITY: 1 M/S
ECHO LVOT STROKE VOLUME INDEX: 35.8 ML/M2
ECHO LVOT SV: 81 ML
ECHO LVOT VTI: 23.4 CM
ECHO RV INTERNAL DIMENSION: 3.8 CM
GLUCOSE BLD STRIP.AUTO-MCNC: 138 MG/DL (ref 65–100)
GLUCOSE BLD STRIP.AUTO-MCNC: 138 MG/DL (ref 65–100)
GLUCOSE BLD STRIP.AUTO-MCNC: 150 MG/DL (ref 65–100)
GLUCOSE BLD STRIP.AUTO-MCNC: 155 MG/DL (ref 65–100)
GLUCOSE BLD STRIP.AUTO-MCNC: 161 MG/DL (ref 65–100)
GLUCOSE BLD STRIP.AUTO-MCNC: 226 MG/DL (ref 65–100)
HCO3 BLD-SCNC: 25.2 MMOL/L (ref 22–26)
HCO3 BLD-SCNC: 26.2 MMOL/L (ref 22–26)
HCO3 BLD-SCNC: 26.8 MMOL/L (ref 22–26)
HEPARIN BOLUS-PATIENT: NORMAL UNITS
HEPARIN BOLUS-PUMP: 0 UNITS
HEPARIN BOLUS-TOTAL: NORMAL UNITS
LV EF: 63 %
LVEF MODALITY: ABNORMAL
NT PRO BNP: 225 PG/ML (ref 5–125)
PCO2 BLD: 44 MMHG (ref 35–45)
PCO2 BLD: 47 MMHG (ref 35–45)
PCO2 BLD: 48.6 MMHG (ref 35–45)
PH BLD: 7.35 [PH] (ref 7.35–7.45)
PH BLD: 7.36 [PH] (ref 7.35–7.45)
PH BLD: 7.37 [PH] (ref 7.35–7.45)
PO2 BLD: 170 MMHG (ref 75–100)
PO2 BLD: 468 MMHG (ref 75–100)
PO2 BLD: 68 MMHG (ref 75–100)
POTASSIUM BLD-SCNC: 4.3 MMOL/L (ref 3.5–5.1)
POTASSIUM BLD-SCNC: 4.3 MMOL/L (ref 3.5–5.1)
POTASSIUM BLD-SCNC: 4.6 MMOL/L (ref 3.5–5.1)
PROJECTED HEPARIN CONCENTATION: 1.6 MG/KG
SAO2 % BLD: 100 %
SAO2 % BLD: 93 %
SAO2 % BLD: 99 %
SERVICE CMNT-IMP: ABNORMAL
SLOPE: 140
SODIUM BLD-SCNC: 142 MMOL/L (ref 136–145)
SPECIMEN SITE: ABNORMAL

## 2022-11-08 PROCEDURE — 2580000003 HC RX 258: Performed by: INTERNAL MEDICINE

## 2022-11-08 PROCEDURE — C1769 GUIDE WIRE: HCPCS | Performed by: INTERNAL MEDICINE

## 2022-11-08 PROCEDURE — 85347 COAGULATION TIME ACTIVATED: CPT

## 2022-11-08 PROCEDURE — 83880 ASSAY OF NATRIURETIC PEPTIDE: CPT

## 2022-11-08 PROCEDURE — 93321 DOPPLER ECHO F-UP/LMTD STD: CPT

## 2022-11-08 PROCEDURE — 84295 ASSAY OF SERUM SODIUM: CPT

## 2022-11-08 PROCEDURE — 33370 TCAT PLMT&RMVL CEPD PERQ: CPT | Performed by: INTERNAL MEDICINE

## 2022-11-08 PROCEDURE — C1889 IMPLANT/INSERT DEVICE, NOC: HCPCS | Performed by: INTERNAL MEDICINE

## 2022-11-08 PROCEDURE — C1760 CLOSURE DEV, VASC: HCPCS | Performed by: INTERNAL MEDICINE

## 2022-11-08 PROCEDURE — 82803 BLOOD GASES ANY COMBINATION: CPT

## 2022-11-08 PROCEDURE — 2580000003 HC RX 258: Performed by: REGISTERED NURSE

## 2022-11-08 PROCEDURE — 2500000003 HC RX 250 WO HCPCS: Performed by: REGISTERED NURSE

## 2022-11-08 PROCEDURE — 3600000007 HC SURGERY HYBRID BASE: Performed by: INTERNAL MEDICINE

## 2022-11-08 PROCEDURE — 33361 REPLACE AORTIC VALVE PERQ: CPT | Performed by: INTERNAL MEDICINE

## 2022-11-08 PROCEDURE — 3700000000 HC ANESTHESIA ATTENDED CARE: Performed by: INTERNAL MEDICINE

## 2022-11-08 PROCEDURE — 2500000003 HC RX 250 WO HCPCS: Performed by: STUDENT IN AN ORGANIZED HEALTH CARE EDUCATION/TRAINING PROGRAM

## 2022-11-08 PROCEDURE — 7100000000 HC PACU RECOVERY - FIRST 15 MIN: Performed by: INTERNAL MEDICINE

## 2022-11-08 PROCEDURE — 2100000000 HC CCU R&B

## 2022-11-08 PROCEDURE — 3700000001 HC ADD 15 MINUTES (ANESTHESIA): Performed by: INTERNAL MEDICINE

## 2022-11-08 PROCEDURE — 85520 HEPARIN ASSAY: CPT

## 2022-11-08 PROCEDURE — 6360000002 HC RX W HCPCS: Performed by: REGISTERED NURSE

## 2022-11-08 PROCEDURE — 82962 GLUCOSE BLOOD TEST: CPT

## 2022-11-08 PROCEDURE — C1887 CATHETER, GUIDING: HCPCS | Performed by: INTERNAL MEDICINE

## 2022-11-08 PROCEDURE — 93454 CORONARY ARTERY ANGIO S&I: CPT | Performed by: INTERNAL MEDICINE

## 2022-11-08 PROCEDURE — 6360000002 HC RX W HCPCS: Performed by: INTERNAL MEDICINE

## 2022-11-08 PROCEDURE — 2580000003 HC RX 258: Performed by: STUDENT IN AN ORGANIZED HEALTH CARE EDUCATION/TRAINING PROGRAM

## 2022-11-08 PROCEDURE — 6360000002 HC RX W HCPCS: Performed by: PHYSICIAN ASSISTANT

## 2022-11-08 PROCEDURE — 6370000000 HC RX 637 (ALT 250 FOR IP): Performed by: PHYSICIAN ASSISTANT

## 2022-11-08 PROCEDURE — C1894 INTRO/SHEATH, NON-LASER: HCPCS | Performed by: INTERNAL MEDICINE

## 2022-11-08 PROCEDURE — 7100000001 HC PACU RECOVERY - ADDTL 15 MIN: Performed by: INTERNAL MEDICINE

## 2022-11-08 PROCEDURE — 02RF38Z REPLACEMENT OF AORTIC VALVE WITH ZOOPLASTIC TISSUE, PERCUTANEOUS APPROACH: ICD-10-PCS | Performed by: THORACIC SURGERY (CARDIOTHORACIC VASCULAR SURGERY)

## 2022-11-08 PROCEDURE — 6360000004 HC RX CONTRAST MEDICATION: Performed by: INTERNAL MEDICINE

## 2022-11-08 PROCEDURE — 2500000003 HC RX 250 WO HCPCS: Performed by: INTERNAL MEDICINE

## 2022-11-08 PROCEDURE — 2709999900 HC NON-CHARGEABLE SUPPLY: Performed by: INTERNAL MEDICINE

## 2022-11-08 PROCEDURE — C1884 EMBOLIZATION PROTECT SYST: HCPCS | Performed by: INTERNAL MEDICINE

## 2022-11-08 PROCEDURE — 2720000010 HC SURG SUPPLY STERILE: Performed by: INTERNAL MEDICINE

## 2022-11-08 PROCEDURE — 84132 ASSAY OF SERUM POTASSIUM: CPT

## 2022-11-08 PROCEDURE — 3600000017 HC SURGERY HYBRID ADDL 15MIN: Performed by: INTERNAL MEDICINE

## 2022-11-08 PROCEDURE — 2700000000 HC OXYGEN THERAPY PER DAY

## 2022-11-08 DEVICE — ANGIO-SEAL VIP VASCULAR CLOSURE DEVICE
Type: IMPLANTABLE DEVICE | Status: FUNCTIONAL
Brand: ANGIO-SEAL

## 2022-11-08 DEVICE — VLV EVPROPLUS-29 COMM US
Type: IMPLANTABLE DEVICE | Site: HEART | Status: FUNCTIONAL
Brand: EVOLUT™ PRO+

## 2022-11-08 RX ORDER — SODIUM CHLORIDE 0.9 % (FLUSH) 0.9 %
5-40 SYRINGE (ML) INJECTION PRN
Status: DISCONTINUED | OUTPATIENT
Start: 2022-11-08 | End: 2022-11-08 | Stop reason: HOSPADM

## 2022-11-08 RX ORDER — MORPHINE SULFATE 4 MG/ML
4 INJECTION INTRAVENOUS
Status: DISCONTINUED | OUTPATIENT
Start: 2022-11-08 | End: 2022-11-09 | Stop reason: HOSPADM

## 2022-11-08 RX ORDER — SODIUM CHLORIDE 9 MG/ML
INJECTION, SOLUTION INTRAVENOUS PRN
Status: DISCONTINUED | OUTPATIENT
Start: 2022-11-08 | End: 2022-11-08 | Stop reason: HOSPADM

## 2022-11-08 RX ORDER — SODIUM CHLORIDE 0.9 % (FLUSH) 0.9 %
5-40 SYRINGE (ML) INJECTION PRN
Status: DISCONTINUED | OUTPATIENT
Start: 2022-11-08 | End: 2022-11-09 | Stop reason: HOSPADM

## 2022-11-08 RX ORDER — HEPARIN SODIUM 1000 [USP'U]/ML
INJECTION, SOLUTION INTRAVENOUS; SUBCUTANEOUS PRN
Status: DISCONTINUED | OUTPATIENT
Start: 2022-11-08 | End: 2022-11-08 | Stop reason: SDUPTHER

## 2022-11-08 RX ORDER — NITROGLYCERIN 0.4 MG/1
0.4 TABLET SUBLINGUAL EVERY 5 MIN PRN
Status: DISCONTINUED | OUTPATIENT
Start: 2022-11-08 | End: 2022-11-08 | Stop reason: SDUPTHER

## 2022-11-08 RX ORDER — ACETAMINOPHEN 325 MG/1
650 TABLET ORAL EVERY 6 HOURS PRN
Status: DISCONTINUED | OUTPATIENT
Start: 2022-11-08 | End: 2022-11-09 | Stop reason: HOSPADM

## 2022-11-08 RX ORDER — DEXMEDETOMIDINE HYDROCHLORIDE 4 UG/ML
INJECTION, SOLUTION INTRAVENOUS CONTINUOUS PRN
Status: DISCONTINUED | OUTPATIENT
Start: 2022-11-08 | End: 2022-11-08 | Stop reason: SDUPTHER

## 2022-11-08 RX ORDER — IPRATROPIUM BROMIDE AND ALBUTEROL SULFATE 2.5; .5 MG/3ML; MG/3ML
1 SOLUTION RESPIRATORY (INHALATION)
Status: DISCONTINUED | OUTPATIENT
Start: 2022-11-08 | End: 2022-11-08 | Stop reason: HOSPADM

## 2022-11-08 RX ORDER — HYDROCODONE BITARTRATE AND ACETAMINOPHEN 5; 325 MG/1; MG/1
1 TABLET ORAL EVERY 4 HOURS PRN
Status: DISCONTINUED | OUTPATIENT
Start: 2022-11-08 | End: 2022-11-09 | Stop reason: HOSPADM

## 2022-11-08 RX ORDER — OXYCODONE HYDROCHLORIDE 5 MG/1
5 TABLET ORAL PRN
Status: DISCONTINUED | OUTPATIENT
Start: 2022-11-08 | End: 2022-11-08 | Stop reason: HOSPADM

## 2022-11-08 RX ORDER — MORPHINE SULFATE 4 MG/ML
2 INJECTION INTRAVENOUS
Status: DISCONTINUED | OUTPATIENT
Start: 2022-11-08 | End: 2022-11-09 | Stop reason: HOSPADM

## 2022-11-08 RX ORDER — SODIUM CHLORIDE, SODIUM LACTATE, POTASSIUM CHLORIDE, CALCIUM CHLORIDE 600; 310; 30; 20 MG/100ML; MG/100ML; MG/100ML; MG/100ML
INJECTION, SOLUTION INTRAVENOUS CONTINUOUS
Status: DISCONTINUED | OUTPATIENT
Start: 2022-11-08 | End: 2022-11-08 | Stop reason: HOSPADM

## 2022-11-08 RX ORDER — ATORVASTATIN CALCIUM 80 MG/1
80 TABLET, FILM COATED ORAL NIGHTLY
Status: DISCONTINUED | OUTPATIENT
Start: 2022-11-08 | End: 2022-11-09 | Stop reason: HOSPADM

## 2022-11-08 RX ORDER — ONDANSETRON 2 MG/ML
4 INJECTION INTRAMUSCULAR; INTRAVENOUS EVERY 6 HOURS PRN
Status: DISCONTINUED | OUTPATIENT
Start: 2022-11-08 | End: 2022-11-08 | Stop reason: SDUPTHER

## 2022-11-08 RX ORDER — PROCHLORPERAZINE EDISYLATE 5 MG/ML
5 INJECTION INTRAMUSCULAR; INTRAVENOUS
Status: DISCONTINUED | OUTPATIENT
Start: 2022-11-08 | End: 2022-11-08 | Stop reason: HOSPADM

## 2022-11-08 RX ORDER — FENTANYL CITRATE 50 UG/ML
25 INJECTION, SOLUTION INTRAMUSCULAR; INTRAVENOUS
Status: DISCONTINUED | OUTPATIENT
Start: 2022-11-08 | End: 2022-11-08 | Stop reason: HOSPADM

## 2022-11-08 RX ORDER — HYDRALAZINE HYDROCHLORIDE 20 MG/ML
10 INJECTION INTRAMUSCULAR; INTRAVENOUS
Status: DISCONTINUED | OUTPATIENT
Start: 2022-11-08 | End: 2022-11-08 | Stop reason: HOSPADM

## 2022-11-08 RX ORDER — DEXAMETHASONE SODIUM PHOSPHATE 4 MG/ML
INJECTION, SOLUTION INTRA-ARTICULAR; INTRALESIONAL; INTRAMUSCULAR; INTRAVENOUS; SOFT TISSUE PRN
Status: DISCONTINUED | OUTPATIENT
Start: 2022-11-08 | End: 2022-11-08 | Stop reason: SDUPTHER

## 2022-11-08 RX ORDER — HEPARIN SODIUM 200 [USP'U]/100ML
INJECTION, SOLUTION INTRAVENOUS CONTINUOUS PRN
Status: DISCONTINUED | OUTPATIENT
Start: 2022-11-08 | End: 2022-11-08 | Stop reason: HOSPADM

## 2022-11-08 RX ORDER — INSULIN LISPRO 100 [IU]/ML
0-8 INJECTION, SOLUTION INTRAVENOUS; SUBCUTANEOUS
Status: DISCONTINUED | OUTPATIENT
Start: 2022-11-08 | End: 2022-11-09 | Stop reason: HOSPADM

## 2022-11-08 RX ORDER — ASPIRIN 81 MG/1
81 TABLET, CHEWABLE ORAL DAILY
Status: DISCONTINUED | OUTPATIENT
Start: 2022-11-08 | End: 2022-11-08 | Stop reason: SDUPTHER

## 2022-11-08 RX ORDER — ONDANSETRON 2 MG/ML
4 INJECTION INTRAMUSCULAR; INTRAVENOUS EVERY 6 HOURS PRN
Status: DISCONTINUED | OUTPATIENT
Start: 2022-11-08 | End: 2022-11-09 | Stop reason: HOSPADM

## 2022-11-08 RX ORDER — VASOPRESSIN 20 U/ML
INJECTION PARENTERAL PRN
Status: DISCONTINUED | OUTPATIENT
Start: 2022-11-08 | End: 2022-11-08 | Stop reason: SDUPTHER

## 2022-11-08 RX ORDER — ACETAMINOPHEN 325 MG/1
650 TABLET ORAL EVERY 4 HOURS PRN
Status: DISCONTINUED | OUTPATIENT
Start: 2022-11-08 | End: 2022-11-08 | Stop reason: SDUPTHER

## 2022-11-08 RX ORDER — SODIUM CHLORIDE 0.9 % (FLUSH) 0.9 %
5-40 SYRINGE (ML) INJECTION EVERY 12 HOURS SCHEDULED
Status: DISCONTINUED | OUTPATIENT
Start: 2022-11-08 | End: 2022-11-08 | Stop reason: HOSPADM

## 2022-11-08 RX ORDER — HYDROCODONE BITARTRATE AND ACETAMINOPHEN 5; 325 MG/1; MG/1
2 TABLET ORAL EVERY 4 HOURS PRN
Status: DISCONTINUED | OUTPATIENT
Start: 2022-11-08 | End: 2022-11-09 | Stop reason: HOSPADM

## 2022-11-08 RX ORDER — MIDAZOLAM HYDROCHLORIDE 1 MG/ML
INJECTION INTRAMUSCULAR; INTRAVENOUS PRN
Status: DISCONTINUED | OUTPATIENT
Start: 2022-11-08 | End: 2022-11-08 | Stop reason: SDUPTHER

## 2022-11-08 RX ORDER — POLYETHYLENE GLYCOL 3350 17 G/17G
17 POWDER, FOR SOLUTION ORAL DAILY PRN
Status: DISCONTINUED | OUTPATIENT
Start: 2022-11-08 | End: 2022-11-09 | Stop reason: HOSPADM

## 2022-11-08 RX ORDER — NITROGLYCERIN 0.4 MG/1
0.4 TABLET SUBLINGUAL EVERY 5 MIN PRN
Status: DISCONTINUED | OUTPATIENT
Start: 2022-11-08 | End: 2022-11-09 | Stop reason: HOSPADM

## 2022-11-08 RX ORDER — HYDROMORPHONE HYDROCHLORIDE 2 MG/ML
0.5 INJECTION, SOLUTION INTRAMUSCULAR; INTRAVENOUS; SUBCUTANEOUS EVERY 5 MIN PRN
Status: DISCONTINUED | OUTPATIENT
Start: 2022-11-08 | End: 2022-11-08 | Stop reason: HOSPADM

## 2022-11-08 RX ORDER — OXYCODONE HYDROCHLORIDE 5 MG/1
10 TABLET ORAL PRN
Status: DISCONTINUED | OUTPATIENT
Start: 2022-11-08 | End: 2022-11-08 | Stop reason: HOSPADM

## 2022-11-08 RX ORDER — SODIUM CHLORIDE, SODIUM LACTATE, POTASSIUM CHLORIDE, CALCIUM CHLORIDE 600; 310; 30; 20 MG/100ML; MG/100ML; MG/100ML; MG/100ML
INJECTION, SOLUTION INTRAVENOUS CONTINUOUS PRN
Status: DISCONTINUED | OUTPATIENT
Start: 2022-11-08 | End: 2022-11-08 | Stop reason: SDUPTHER

## 2022-11-08 RX ORDER — DEXMEDETOMIDINE HYDROCHLORIDE 100 UG/ML
INJECTION, SOLUTION INTRAVENOUS PRN
Status: DISCONTINUED | OUTPATIENT
Start: 2022-11-08 | End: 2022-11-08 | Stop reason: SDUPTHER

## 2022-11-08 RX ORDER — SODIUM CHLORIDE 9 MG/ML
INJECTION, SOLUTION INTRAVENOUS PRN
Status: ACTIVE | OUTPATIENT
Start: 2022-11-08 | End: 2022-11-08

## 2022-11-08 RX ORDER — INSULIN LISPRO 100 [IU]/ML
0-4 INJECTION, SOLUTION INTRAVENOUS; SUBCUTANEOUS NIGHTLY
Status: DISCONTINUED | OUTPATIENT
Start: 2022-11-08 | End: 2022-11-09 | Stop reason: HOSPADM

## 2022-11-08 RX ORDER — LIDOCAINE HYDROCHLORIDE 10 MG/ML
INJECTION, SOLUTION INFILTRATION; PERINEURAL PRN
Status: DISCONTINUED | OUTPATIENT
Start: 2022-11-08 | End: 2022-11-08 | Stop reason: HOSPADM

## 2022-11-08 RX ORDER — FENTANYL CITRATE 50 UG/ML
100 INJECTION, SOLUTION INTRAMUSCULAR; INTRAVENOUS
Status: DISCONTINUED | OUTPATIENT
Start: 2022-11-08 | End: 2022-11-08 | Stop reason: HOSPADM

## 2022-11-08 RX ORDER — DIPHENHYDRAMINE HYDROCHLORIDE 50 MG/ML
12.5 INJECTION INTRAMUSCULAR; INTRAVENOUS
Status: DISCONTINUED | OUTPATIENT
Start: 2022-11-08 | End: 2022-11-08 | Stop reason: HOSPADM

## 2022-11-08 RX ORDER — CARVEDILOL 12.5 MG/1
12.5 TABLET ORAL 2 TIMES DAILY WITH MEALS
Status: DISCONTINUED | OUTPATIENT
Start: 2022-11-08 | End: 2022-11-09 | Stop reason: HOSPADM

## 2022-11-08 RX ORDER — ASPIRIN 81 MG/1
81 TABLET, CHEWABLE ORAL DAILY
Status: DISCONTINUED | OUTPATIENT
Start: 2022-11-08 | End: 2022-11-09 | Stop reason: HOSPADM

## 2022-11-08 RX ORDER — PROPOFOL 10 MG/ML
INJECTION, EMULSION INTRAVENOUS PRN
Status: DISCONTINUED | OUTPATIENT
Start: 2022-11-08 | End: 2022-11-08 | Stop reason: SDUPTHER

## 2022-11-08 RX ORDER — NICARDIPINE HYDROCHLORIDE 0.1 MG/ML
INJECTION INTRAVENOUS PRN
Status: DISCONTINUED | OUTPATIENT
Start: 2022-11-08 | End: 2022-11-08 | Stop reason: SDUPTHER

## 2022-11-08 RX ORDER — SODIUM CHLORIDE 0.9 % (FLUSH) 0.9 %
5-40 SYRINGE (ML) INJECTION EVERY 12 HOURS SCHEDULED
Status: DISCONTINUED | OUTPATIENT
Start: 2022-11-08 | End: 2022-11-09 | Stop reason: HOSPADM

## 2022-11-08 RX ORDER — PROTAMINE SULFATE 10 MG/ML
INJECTION, SOLUTION INTRAVENOUS PRN
Status: DISCONTINUED | OUTPATIENT
Start: 2022-11-08 | End: 2022-11-08 | Stop reason: SDUPTHER

## 2022-11-08 RX ORDER — LIDOCAINE HYDROCHLORIDE 10 MG/ML
1 INJECTION, SOLUTION INFILTRATION; PERINEURAL
Status: COMPLETED | OUTPATIENT
Start: 2022-11-08 | End: 2022-11-08

## 2022-11-08 RX ORDER — LABETALOL HYDROCHLORIDE 5 MG/ML
10 INJECTION, SOLUTION INTRAVENOUS
Status: DISCONTINUED | OUTPATIENT
Start: 2022-11-08 | End: 2022-11-08 | Stop reason: HOSPADM

## 2022-11-08 RX ORDER — HALOPERIDOL 5 MG/ML
1 INJECTION INTRAMUSCULAR
Status: DISCONTINUED | OUTPATIENT
Start: 2022-11-08 | End: 2022-11-08 | Stop reason: HOSPADM

## 2022-11-08 RX ORDER — FUROSEMIDE 10 MG/ML
INJECTION INTRAMUSCULAR; INTRAVENOUS PRN
Status: DISCONTINUED | OUTPATIENT
Start: 2022-11-08 | End: 2022-11-08 | Stop reason: SDUPTHER

## 2022-11-08 RX ADMIN — DEXMEDETOMIDINE 4 MCG: 100 INJECTION, SOLUTION, CONCENTRATE INTRAVENOUS at 10:49

## 2022-11-08 RX ADMIN — VASOPRESSIN 1 UNITS: 20 INJECTION PARENTERAL at 09:55

## 2022-11-08 RX ADMIN — VASOPRESSIN 1 UNITS: 20 INJECTION PARENTERAL at 11:11

## 2022-11-08 RX ADMIN — PHENYLEPHRINE HYDROCHLORIDE 200 MCG: 10 INJECTION INTRAVENOUS at 09:45

## 2022-11-08 RX ADMIN — SODIUM CHLORIDE 0.8 MCG/KG/HR: 9 INJECTION, SOLUTION INTRAVENOUS at 09:14

## 2022-11-08 RX ADMIN — VASOPRESSIN 1 UNITS: 20 INJECTION PARENTERAL at 10:03

## 2022-11-08 RX ADMIN — Medication 3 AMPULE: at 07:35

## 2022-11-08 RX ADMIN — VASOPRESSIN 1 UNITS: 20 INJECTION PARENTERAL at 09:53

## 2022-11-08 RX ADMIN — DEXMEDETOMIDINE 4 MCG: 100 INJECTION, SOLUTION, CONCENTRATE INTRAVENOUS at 10:47

## 2022-11-08 RX ADMIN — FUROSEMIDE 40 MG: 10 INJECTION, SOLUTION INTRAMUSCULAR; INTRAVENOUS at 09:55

## 2022-11-08 RX ADMIN — Medication 10 MCG: at 09:43

## 2022-11-08 RX ADMIN — PROPOFOL 25 MG: 10 INJECTION, EMULSION INTRAVENOUS at 09:16

## 2022-11-08 RX ADMIN — ATORVASTATIN CALCIUM 80 MG: 80 TABLET, FILM COATED ORAL at 21:10

## 2022-11-08 RX ADMIN — Medication 2000 MG: at 09:36

## 2022-11-08 RX ADMIN — SODIUM CHLORIDE, POTASSIUM CHLORIDE, SODIUM LACTATE AND CALCIUM CHLORIDE: 600; 310; 30; 20 INJECTION, SOLUTION INTRAVENOUS at 07:33

## 2022-11-08 RX ADMIN — PHENYLEPHRINE HYDROCHLORIDE 45 MCG/MIN: 10 INJECTION INTRAVENOUS at 09:50

## 2022-11-08 RX ADMIN — PHENYLEPHRINE HYDROCHLORIDE 200 MCG: 10 INJECTION INTRAVENOUS at 11:12

## 2022-11-08 RX ADMIN — CEFAZOLIN SODIUM 2000 MG: 100 INJECTION, POWDER, LYOPHILIZED, FOR SOLUTION INTRAVENOUS at 16:51

## 2022-11-08 RX ADMIN — PHENYLEPHRINE HYDROCHLORIDE 200 MCG: 10 INJECTION INTRAVENOUS at 10:02

## 2022-11-08 RX ADMIN — NOREPINEPHRINE BITARTRATE 8 MCG: 1 SOLUTION INTRAVENOUS at 09:44

## 2022-11-08 RX ADMIN — DEXMEDETOMIDINE 80 MCG: 100 INJECTION, SOLUTION, CONCENTRATE INTRAVENOUS at 09:10

## 2022-11-08 RX ADMIN — MIDAZOLAM 1 MG: 1 INJECTION INTRAMUSCULAR; INTRAVENOUS at 10:33

## 2022-11-08 RX ADMIN — PROPOFOL 50 MCG/KG/MIN: 10 INJECTION, EMULSION INTRAVENOUS at 09:17

## 2022-11-08 RX ADMIN — SODIUM CHLORIDE, PRESERVATIVE FREE 10 ML: 5 INJECTION INTRAVENOUS at 21:10

## 2022-11-08 RX ADMIN — PHENYLEPHRINE HYDROCHLORIDE 200 MCG: 10 INJECTION INTRAVENOUS at 11:36

## 2022-11-08 RX ADMIN — MIDAZOLAM 1 MG: 1 INJECTION INTRAMUSCULAR; INTRAVENOUS at 10:12

## 2022-11-08 RX ADMIN — HEPARIN SODIUM 15000 UNITS: 1000 INJECTION, SOLUTION INTRAVENOUS; SUBCUTANEOUS at 10:28

## 2022-11-08 RX ADMIN — HEPARIN SODIUM 3000 UNITS: 1000 INJECTION, SOLUTION INTRAVENOUS; SUBCUTANEOUS at 10:41

## 2022-11-08 RX ADMIN — DEXAMETHASONE SODIUM PHOSPHATE 10 MG: 4 INJECTION, SOLUTION INTRAMUSCULAR; INTRAVENOUS at 09:52

## 2022-11-08 RX ADMIN — PHENYLEPHRINE HYDROCHLORIDE 200 MCG: 10 INJECTION INTRAVENOUS at 09:43

## 2022-11-08 RX ADMIN — CARVEDILOL 12.5 MG: 12.5 TABLET, FILM COATED ORAL at 16:16

## 2022-11-08 RX ADMIN — PROPOFOL 20 MG: 10 INJECTION, EMULSION INTRAVENOUS at 09:30

## 2022-11-08 RX ADMIN — NICARDIPINE HYDROCHLORIDE 0.1 MG: 0.1 INJECTION INTRAVENOUS at 10:53

## 2022-11-08 RX ADMIN — DEXMEDETOMIDINE 8 MCG: 100 INJECTION, SOLUTION, CONCENTRATE INTRAVENOUS at 10:46

## 2022-11-08 RX ADMIN — NOREPINEPHRINE BITARTRATE 8 MCG: 1 SOLUTION INTRAVENOUS at 09:46

## 2022-11-08 RX ADMIN — SODIUM CHLORIDE, SODIUM LACTATE, POTASSIUM CHLORIDE, AND CALCIUM CHLORIDE: 600; 310; 30; 20 INJECTION, SOLUTION INTRAVENOUS at 09:10

## 2022-11-08 RX ADMIN — NOREPINEPHRINE BITARTRATE 8 MCG: 1 SOLUTION INTRAVENOUS at 09:47

## 2022-11-08 RX ADMIN — PROPOFOL 20 MG: 10 INJECTION, EMULSION INTRAVENOUS at 09:34

## 2022-11-08 RX ADMIN — NOREPINEPHRINE BITARTRATE 8 MCG: 1 SOLUTION INTRAVENOUS at 09:48

## 2022-11-08 RX ADMIN — PHENYLEPHRINE HYDROCHLORIDE 200 MCG: 10 INJECTION INTRAVENOUS at 10:18

## 2022-11-08 RX ADMIN — PROTAMINE SULFATE 100 MG: 10 INJECTION, SOLUTION INTRAVENOUS at 11:01

## 2022-11-08 ASSESSMENT — KANSAS CITY CARDIOMYOPATHY QUESTIONNAIRE (KCCQ12)
7. IF YOU HAD TO SPEND THE REST OF YOUR LIFE WITH YOUR HEART FAILURE THE WAY IT IS RIGHT NOW, HOW WOULD YOU FEEL ABOUT THIS?: 4
8A. OVER THE PAST 2 WEEKS, ON AVERAGE, HOW HAS HEART FAILURE LIMITED YOU ABILITY TO DO HOBBIES OR RECREATIONAL ACTIVITIES: 5
1C. OVER THE PAST 2 WEEKS, HOW MUCH WERE YOU LIMITED BY HEART FAILURE SYMPTOMS (SHORTNESS OF BREATH OR FATIGUE) WHEN HURRYING OR JOGGING (AS IF TO CATCH A BUS): 3
1B. OVER THE PAST 2 WEEKS, HOW MUCH WERE YOU LIMITED BY HEART FAILURE SYMPTOMS (SHORTNESS OF BREATH OR FATIGUE) WHEN WALKING 1 BLOCK ON LEVEL GROUND: 4
5. OVER THE PAST 2 WEEKS, ON AVERAGE, HOW MANY TIMES HAVE YOU BEEN FORCED TO SLEEP SITTING UP IN A CHAIR OR WITH AT LEAST 3 PILLOWS TO PROP YOU UP BECAUSE OF SHORTNESS OF BREATH?: 5
6. OVER THE PAST 2 WEEKS, HOW MUCH HAS YOUR HEART FAILURE LIMITED YOUR ENJOYMENT OF LIFE: 4
2. OVER THE PAST 2 WEEKS, HOW MANY TIMES DID YOU HAVE SWELLING IN YOUR FEET, ANKLES OR LEGS WHEN YOU WOKE UP IN THE MORNING: 5
4. OVER THE PAST 2 WEEKS, ON AVERAGE, HOW MANY TIMES HAS SHORTNESS OF BREATH LIMITED YOUR ABILITY TO DO WHAT YOU WANTED: 6
1A. OVER THE PAST 2 WEEKS, HOW MUCH WERE YOU LIMITED BY HEART FAILURE SYMPTOMS (SHORTNESS OF BREATH OR FATIGUE) WHEN SHOWERING OR BATHING: 5
8B. OVER THE PAST 2 WEEKS, ON AVERAGE, HOW HAS HEART FAILURE LIMITED YOU ABILITY TO WORK OR DO HOUSEHOLD CHORES: 5
3. OVER THE PAST 2 WEEKS, ON AVERAGE, HOW MANY TIMES HAS FATIGUE LIMITED YOUR ABILITY TO DO WHAT YOU WANTED: 6
8C. OVER THE PAST 2 WEEKS, ON AVERAGE, HOW HAS HEART FAILURE LIMITED YOU ABILITY TO VISIT FAMILY AND FRIENDS OUR OF YOUR HOME: 5

## 2022-11-08 ASSESSMENT — PAIN - FUNCTIONAL ASSESSMENT: PAIN_FUNCTIONAL_ASSESSMENT: NONE - DENIES PAIN

## 2022-11-08 ASSESSMENT — PAIN SCALES - GENERAL: PAINLEVEL_OUTOF10: 0

## 2022-11-08 NOTE — CARE COORDINATION
Chart screened by  for discharge planning. No needs identified at this time. Please consult  if any new issues arise.          11/08/22 2363   Condition of Participation: Discharge Planning   The Plan for Transition of Care is related to the following treatment goals: Pending clinical progress

## 2022-11-08 NOTE — PERIOP NOTE
TRANSFER - OUT REPORT:    Verbal report given to receiving RN on [facility]  being transferred to [employer] [facility] [employer] for routine post-op       Report consisted of patients Situation, Background, Assessment and   Recommendations(SBAR). Information from the following report(s) Adult Overview, Surgery Report, MAR, Cardiac Rhythm NSR, and Neuro Assessment was reviewed with the receiving nurse. Lines:   Peripheral IV 11/08/22 Right Hand (Active)   Site Assessment Clean, dry & intact 11/08/22 1238   Line Status Normal saline locked 11/08/22 1238   Line Care Connections checked and tightened 11/08/22 0732   Phlebitis Assessment No symptoms 11/08/22 1238   Infiltration Assessment 0 11/08/22 1238   Alcohol Cap Used No 11/08/22 0732   Dressing Status Clean, dry & intact 11/08/22 1238   Dressing Type Transparent 11/08/22 1238       Peripheral IV 11/08/22 Left Hand (Active)   Site Assessment Clean, dry & intact 11/08/22 1238   Line Status Infusing 11/08/22 [place] Connections checked and tightened 11/08/22 0806   Phlebitis Assessment No symptoms 11/08/22 1238   Infiltration Assessment 0 11/08/22 1238   Alcohol Cap Used Yes 11/08/22 0806   Dressing Status Clean, dry & intact 11/08/22 1238   Dressing Type Transparent 11/08/22 1238        Opportunity for questions and clarification was provided. Patient transported with:   Monitor  O2 @ 3 liters  Registered Nurse    VTE prophylaxis orders have been written for [address] [place] [address]. Patient and family given floor number and nurses name. Family updated re: pt status after security code verified.

## 2022-11-08 NOTE — ANESTHESIA PROCEDURE NOTES
Arterial Line:    An arterial line was placed using surface landmarks, in the OR for the following indication(s): continuous blood pressure monitoring and blood sampling needed. A 20 gauge (size) (length), Arrow (type) catheter was placed, Seldinger technique used, into the left radial artery, secured by tape. Anesthesia type: Local  Local infiltration: Injection    Events:  patient tolerated procedure well with no complications and EBL < 5mL. 11/8/2022 9:17 AM11/8/2022 9:23 AM  Resident/CRNA: LANDON Brown CRNA  Performed: Resident/CRNA   Preanesthetic Checklist  Completed: patient identified, IV checked, site marked, risks and benefits discussed, surgical/procedural consents, equipment checked, pre-op evaluation, timeout performed, anesthesia consent given, oxygen available, monitors applied/VS acknowledged, fire risk safety assessment completed and verbalized and blood product R/B/A discussed and consented

## 2022-11-08 NOTE — ANESTHESIA PRE PROCEDURE
HAMMAD Mejias        [START ON 11/9/2022] sacubitril-valsartan (ENTRESTO) 24-26 MG per tablet 1 tablet  1 tablet Oral BID HAMMAD Mejias        ondansetron [facility]) injection 4 mg  4 mg IntraVENous Q6H PRN HAMMAD Day        acetaminophen (TYLENOL) tablet 650 mg  650 mg Oral Q6H PRN HAMMAD Mejias        polyethylene glycol (GLYCOLAX) packet 17 g  17 g Oral Daily PRN HAMMAD Day        nitroGLYCERIN (NITROSTAT) SL tablet 0.4 mg  0.4 mg SubLINGual Q5 Min PRN HAMMAD Day        aspirin chewable tablet 81 mg  81 mg Oral Daily HAMMAD Flanagan        insulin lispro (HUMALOG) injection vial 0-8 Units  0-8 Units SubCUTAneous TID WC HAMMAD Flanagan        insulin lispro (HUMALOG) injection vial 0-4 Units  0-4 Units SubCUTAneous Nightly HAMMAD Flanagan        lidocaine 1 % injection 1 mL  1 mL IntraDERmal Once PRN Adam Sanches MD        fentaNYL (SUBLIMAZE) injection 25 mcg  25 mcg IntraVENous Once PRN Adam Sanches MD        Or    fentaNYL (SUBLIMAZE) injection 100 mcg  100 mcg IntraVENous Once PRN Adam Sanches MD        lactated ringers infusion   IntraVENous Continuous Adam Sanches  mL/hr at 11/08/22 0733 New Bag at 11/08/22 0733    sodium chloride flush 0.9 % injection 5-40 mL  5-40 mL IntraVENous 2 times per day Adam Sanches MD        sodium chloride flush 0.9 % injection 5-40 mL  5-40 mL IntraVENous PRN Adam Sanches MD        0.9 % sodium chloride infusion   IntraVENous PRN Adam Sanches MD        ceFAZolin (ANCEF) 2000 mg in sterile water 20 mL IV syringe  2,000 mg IntraVENous On Call to [address] [place] [address]           Allergies:  No Known Allergies    Problem List:    Patient Active Problem List   Diagnosis Code    Psoriasis L40.9    Other psoriasis L40.8    Dyslipidemia E78.5    Carotid artery stenosis I65.29    Essential hypertension, benign I10    Atrial flutter (HCC) I48.92    CAD (coronary artery disease) I25.10    Acute on chronic systolic (congestive) heart failure (HCC) I50.23    Acute congestive heart failure (HCC) I50.9    HFrEF (heart failure with reduced ejection fraction) ([facility]) I50.20    Nonrheumatic aortic valve stenosis I35.0       Past Medical History:        Diagnosis Date    Atrial fibrillation ([facility] 9/15/2013    Benign neoplasm of pineal gland ([facility] 9/15/2013    CAD (coronary artery disease)     stent x 1     CHF (congestive heart failure) ([facility])     Current use of long term anticoagulation     Eliquis and Aspirin    Essential hypertension, benign 9/15/2013    H/O heart artery stent     X1    History of basal cell carcinoma     multiple areas removed    History of CEA (carotid endarterectomy)     right    HLD (hyperlipidemia)     managed with medication     HTN (hypertension)     managed with medication     Hypercholesterolemia     Hypertension 7/12/2013    Irregular heart beat     cardiac ablation corrected    LBBB (left bundle branch block)     Obesity     BMI: 36.7    Peripheral vascular disease, unspecified ([facility] 9/15/2013    Psoriasis     S/P ablation of atrial flutter     Severe aortic stenosis     Type 2 diabetes mellitus ([facility] 2012    oral agent only/ AVG / no S/S of low BS/ Last A1c:    Unspecified sleep apnea     no tx at this time- never tested for WOJCIECH       Past Surgical History:        Procedure Laterality Date    ABLATION OF DYSRHYTHMIC FOCUS      CARDIAC PROCEDURE N/A 7/9/2022    LEFT HEART CATH / CORONARY ANGIOGRAPHY performed by Philip Yost MD at [address] [place] [address] CATH LAB    CAROTID ENDARTERECTOMY Right 07/2013    EP DEVICE PROCEDURE N/A 7/11/2022    ABLATION A-FLUTTER performed by Naeem Santiago MD at [address] [place] [address] CATH LAB   [address]  6/27/2012    Xience to mid LAD       Social History:    Social History     Tobacco Use    Smoking status: Former     Types: Cigars    Smokeless tobacco: Never Substance Use Topics    Alcohol use: Yes     Comment: rarely                                Counseling given: Not Answered      Vital Signs (Current): There were no vitals filed for this visit.                                            BP Readings from Last 3 Encounters:   11/08/22 (!) 126/57   11/07/22 (!) 158/82   10/14/22 110/88       NPO Status:                                                                                 BMI:   Wt Readings from Last 3 Encounters:   11/08/22 246 lb (111.6 kg)   11/07/22 249 lb 1.6 oz (113 kg)   10/14/22 249 lb 3 oz (113 kg)     There is no height or weight on file to calculate BMI.    CBC:   Lab Results   Component Value Date/Time    WBC 8.4 11/07/2022 08:39 AM    RBC 5.21 11/07/2022 08:39 AM    HGB 15.1 11/07/2022 08:39 AM    HCT 46.4 11/07/2022 08:39 AM    MCV 89.1 11/07/2022 08:39 AM    MCV 88.7 03/09/2020 10:58 AM    RDW 15.3 11/07/2022 08:39 AM     11/07/2022 08:39 AM       CMP:   Lab Results   Component Value Date/Time     11/07/2022 08:39 AM    K 4.3 11/07/2022 08:39 AM     11/07/2022 08:39 AM    CO2 27 11/07/2022 08:39 AM    BUN 15 11/07/2022 08:39 AM    CREATININE 0.66 11/07/2022 08:39 AM    GFRAA >60 08/03/2022 11:06 PM    AGRATIO 1.7 03/09/2020 11:24 AM    LABGLOM >60 11/07/2022 08:39 AM    GLUCOSE 105 11/07/2022 08:39 AM    PROT 7.9 11/07/2022 08:39 AM    CALCIUM 9.8 11/07/2022 08:39 AM    BILITOT 0.8 11/07/2022 08:39 AM    ALKPHOS 74 11/07/2022 08:39 AM    ALKPHOS 75 03/09/2020 11:24 AM    AST 19 11/07/2022 08:39 AM    ALT 27 11/07/2022 08:39 AM       POC Tests:   Recent Labs     11/08/22  0728   POCGLU 138*       Coags:   Lab Results   Component Value Date/Time    PROTIME 14.1 11/07/2022 08:39 AM    INR 1.1 11/07/2022 08:39 AM       HCG (If Applicable): No results found for: PREGTESTUR, PREGSERUM, HCG, HCGQUANT     ABGs: No results found for: PHART, PO2ART, PSB4SHR, QHQ7CVV, BEART, P3FDVVNZ     Type & Screen (If Applicable):  No results found for: [facility]    Drug/Infectious Status (If Applicable):  No results found for: HIV, HEPCAB    COVID-19 Screening (If Applicable): No results found for: COVID19        Anesthesia Evaluation  Patient summary reviewed and Nursing notes reviewed no history of anesthetic complications:   Airway: Mallampati: III  TM distance: >3 FB   Neck ROM: full  Comment: Short neck  Mouth opening: > = 3 FB   Dental: normal exam         Pulmonary:normal exam  breath sounds clear to auscultation  (+) sleep apnea: on noncompliant,                             Cardiovascular:  Exercise tolerance: poor (<4 METS), Until recently was able to mow lawn and throw mulch. (+) hypertension:, valvular problems/murmurs (severe AS, mod MR): MR and AS, CAD (s/p PCI/stent 2012):, dysrhythmias (s/p ablation 2012/2022): atrial flutter, CHF: systolic, hyperlipidemia        Rhythm: regular  Rate: normal                 ROS comment: TTE 10/2022:   Left Ventricle: Normal left ventricular systolic function with a visually estimated EF of 55 - 60%. Left ventricle size is normal. Mildly increased wall thickness. Normal wall motion. Abnormal diastolic function.   Aortic Valve: Moderately calcified cusp. Mild regurgitation. Severe stenosis of the aortic valve. AV mean gradient is 41 mmHg. AV peak gradient is 67 mmHg. AV peak velocity is 4.1 m/s. AV AT is 95.15 ms. LVOT:AV VTI Index is 0.24. LVOT diameter is 2.1 cm. AV area by continuity VTI is 0.8 cm2. AV Stroke Volume index is 36.2 mL/m2. Neuro/Psych:               GI/Hepatic/Renal:   (+) morbid obesity          Endo/Other:    (+) DiabetesType II DM, , .                 Abdominal:             Vascular:   + PVD, aortic or cerebral, . Other Findings:             Anesthesia Plan      TIVA     ASA 4       Induction: intravenous. arterial line    Anesthetic plan and risks discussed with patient (family). Use of blood products discussed with patient whom.                      Christopher Young Janet Vieira MD   11/8/2022

## 2022-11-08 NOTE — OP NOTE
Operative Note  Operative Report    Patient: Evelyne Baumgarten MRN: [id number]  SSN: [id number]    YOB: 1950  Age: 81 y.o. Sex: male       Date of Surgery: 11/8/2022     Preoperative Diagnosis: Aortic valve stenosis, etiology of cardiac valve disease unspecified [I35.0]     Postoperative Diagnosis: * No post-op diagnosis entered *     Primary Cardiothoracic Surgeon: Carol Kapadia MD     Primary Interventional Cardiologist: Rivka Jiang MD    Anesthesia: Monitor Anesthesia Care     Procedure: Procedure(s):  Transcatheter aortic valve replacement  TRANSCATHETER AORTIC VALVE REPLACEMENT   Transcatheter aortic valve replacement (29 mm MDT transcatheter aortic valve via right common femoral artery percutaneous approach). Findings:  Successful deployment of a 29 mm MDT transcatheter aortic valve via right common femoral artery percutaneous approach. good LV function with trace perivalvular regurgitation. The patient was in unchanged from previous tracings at the conclusion of the procedure. Indication for Procedure: The patient is a 81 y.o. male with symptomatic severe aortic stenosis. He was not felt to be a candidate for surgical aortic valve replacement and after discussion at the multidisciplinary valve board transcatheter aortic valve replacement was recommended. After a thorough discussion of all the risks and proposed benefits, the patient agreed to proceed. Procedure in Detail:   Patient premedicated and brought to the operating suite. Time-out performed. Standard monitoring lines placed, and the patient was intubated and placed under general anesthesia without event. Transesophageal echocardiogram was performed, confirming the above valve pathology. Intravenous cefazolin was administered. Patient prepped and draped in standard, meticulous surgical fashion. Ioban drapes were used.      Access to the left common femoral artery and vein was obtained under ultrasound guidance, and 6-Zimbabwean sheaths were placed. A temporary transvenous right ventricular pacing wire was positioned via the femoral vein. Next, a micropuncture kit was used to gain access to the right common femoral artery and a 6-[place]an sheath was placed. This was upsized and the 16-[place]an Romano transcatheter heart valve sheath was placed after deploying Perclose devices in a pre-close fashion. Next, a balloon valvuloplasty was performed. The patient remained hemodynamically stable. The 29mm MDT  valve was then prepped and mounted onto the delivery system in the correct orientation. This was then positioned in the ascending aorta, and then advanced across the aortic valve and deployed successfully. The details of this are dictated under a separate cover in Dr. Emory Gaspar report. Following deployment, an aortogram and transesophageal echocardiogram were performed, which demonstrated a trace aortic regurgitation and a well-seated, well-functioning prosthetic valve in the aortic position. The patient remained hemodynamically stable and in unchanged from previous tracings. At this point, the delivery system and the sheath were removed from the right common femoral artery, and hemostasis was obtained using the Perclose sutures. Sheaths were also removed from the left common femoral artery and vein. At the conclusion of the procedure, the patient was hemodynamically stable and hemostasis was good. The patient was then transported to telemetry in stable condition. At the end of the case, all sharp, sponge, and instrument counts were reported as being correct. Estimated Blood Loss:  minimal    Tourniquet Time: * No tourniquets in log *      Implants:   Implant Name Type Inv.  Item Serial No.  Lot No. LRB No. Used Action   VALVE AORT 29MM ANNULUS [id number] SUP ANNULAR SELF EXP - [id number] Aortic valves VALVE AORT 29MM ANNULUS [id number] SUP ANNULAR SELF EXP [id number] [employer] [place] [employer]-  N/A 1 Implanted Specimens: * No specimens in log *        Drains: None                Complications: None    Counts: Sponge and needle counts were correct times two.     Signed By:  Aleja Landeros MD     November 8, 2022

## 2022-11-08 NOTE — PROGRESS NOTES
TRANSFER - IN REPORT:    Verbal report received from hospitals Abdullahi, [address] on [address] [facility]  being received from PACU for routine progression of patient care      Report consisted of patient's Situation, Background, Assessment and   Recommendations(SBAR). Information from the following report(s) Nurse Handoff Report, Index, Intake/Output, MAR, Recent Results, Med Rec Status, and Cardiac Rhythm NSR  was reviewed with the receiving nurse. Opportunity for questions and clarification was provided. Assessment completed upon patient's arrival to unit and care assumed.

## 2022-11-08 NOTE — H&P
[facility] CARDIOLOGY  [address], [address]          2022     NAME:  Inder Tolentino  : 1950  MRN: [id number]        SUBJECTIVE:   Inder Tolentino is a 81 y.o. male seen for a follow up visit regarding the following:          Chief Complaint   Patient presents with    Hypertension    Coronary Artery Disease         HPI:   81 y.o. male with recent diagnosis of heart failure with reduced ejection fraction, wide-complex tachycardia that was noted to be atrial flutter now status post ablation, hypertension, dyslipidemia, mild to moderate nonobstructive CAD and severe aortic stenosis. He presents today for TAVR consultation. He has completed TAVR work-up  He remains active with mild to moderate exertional dyspnea which has been stable. Repeat echo after atrial flutter ablation demonstrates normalization of LVEF with severe aortic stenosis. Result status: Edited Result - FINAL       Left Ventricle: Normal left ventricular systolic function with a visually estimated EF of 55 - 60%. Left ventricle size is normal. Mildly increased wall thickness. Normal wall motion. Abnormal diastolic function. Left Atrium: Left atrium is mildly dilated. LA Vol Index A/L is 35 mL/m2. Aortic Valve: Moderately calcified cusp. Mild regurgitation. Severe stenosis of the aortic valve. AV mean gradient is 41 mmHg. AV peak gradient is 67 mmHg. AV peak velocity is 4.1 m/s. AV AT is 95.15 ms. LVOT:AV VTI Index is 0.24. LVOT diameter is 2.1 cm. AV area by continuity VTI is 0.8 cm2. AV Stroke Volume index is 36.2 mL/m2. Mitral Valve: Mild annular calcification of the mitral valve. Mild regurgitation. Past Medical History, Past Surgical History, Family history, Social History, and Medications were all reviewed with the patient today and updated as necessary.       Current Facility-Administered Medications          Current Outpatient Medications   Medication Sig Dispense Refill    empagliflozin (JARDIANCE) 10 MG tablet Take 1 tablet by mouth in the morning. 30 tablet 6    sacubitril-valsartan (ENTRESTO) 24-26 MG per tablet Take 1 tablet by mouth 2 times daily 60 tablet 3    nitroGLYCERIN (NITROSTAT) 0.4 MG SL tablet Place 1 tablet under the tongue every 5 minutes as needed for Chest pain up to max of 3 total doses. If no relief after 1 dose, call 911. 25 tablet 3    apixaban (ELIQUIS) 5 MG TABS tablet Take 5 mg by mouth 2 times daily        aspirin 81 MG chewable tablet Take 81 mg by mouth        atorvastatin (LIPITOR) 80 MG tablet TAKE 1 TABLET BY MOUTH EVERY DAY        carvedilol (COREG) 12.5 MG tablet Take 12.5 mg by mouth 2 times daily (with meals)        metFORMIN (GLUCOPHAGE) 1000 MG tablet Take 1,000 mg by mouth 2 times daily (with meals)          No current facility-administered medications for this visit. Patient Active Problem List     Diagnosis Date Noted    Acute on chronic systolic (congestive) heart failure ([facility] 07/08/2022       Priority: High    Nonrheumatic aortic valve stenosis 09/21/2022       Priority: Medium    HFrEF (heart failure with reduced ejection fraction) ([address] 08/26/2022       Priority: Medium    Acute congestive heart failure ([address]         Priority: Medium    Other psoriasis 09/15/2013    Essential hypertension, benign 09/15/2013    Carotid artery stenosis 07/11/2013 7/11/13 (Dr. Yuniel Flores carotid endarterectomy with bovine pericardium   patch.           Dyslipidemia 06/30/2012    Atrial flutter ([address] 06/30/2012    CAD (coronary artery disease) 06/30/2012    Psoriasis 06/26/2012      Family History         Family History   Problem Relation Age of Onset    Diabetes Mother      Cancer Father      Heart Disease Mother      Hypertension Mother           Social History           Tobacco Use    Smoking status: Never    Smokeless tobacco: Never   Substance Use Topics    Alcohol use: Yes         Review Of Symptoms     Review of Systems   Constitutional: Negative for fever and malaise/fatigue. HENT:  Negative for nosebleeds. Cardiovascular:  Negative for chest pain, dyspnea on exertion and palpitations. Respiratory:  Positive for shortness of breath. Negative for cough. Musculoskeletal:  Negative for back pain, muscle cramps, muscle weakness and myalgias. Gastrointestinal:  Negative for abdominal pain. Neurological:  Negative for dizziness. Physical Exam  Blood pressure 128/70, pulse 76, height 5' 9\" (1.753 m), weight 248 lb 9.6 oz (112.8 kg), SpO2 97 %. Physical Exam  HENT:      Head: Normocephalic and atraumatic. Eyes:      Extraocular Movements: Extraocular movements intact. Cardiovascular:      Rate and Rhythm: Normal rate and regular rhythm. Heart sounds: Murmur heard. Systolic murmur is present. Pulmonary:      Effort: Pulmonary effort is normal.      Breath sounds: Normal breath sounds. Abdominal:      Palpations: Abdomen is soft. Musculoskeletal:         General: Normal range of motion. Skin:     General: Skin is warm and dry. Neurological:      General: No focal deficit present. Mental Status: He is oriented to person, place, and time. Medical problems, medical history and test results were reviewed with the patient today.               Lab Results   Component Value Date     CHOL 99 07/09/2022     CHOL 150 03/09/2020            Lab Results   Component Value Date     TRIG 92 07/09/2022     TRIG 147 03/09/2020            Lab Results   Component Value Date     HDL 29 (L) 07/09/2022     HDL 28 (L) 03/09/2020            Lab Results   Component Value Date     LDLCALC 51.6 07/09/2022     LDLCALC 93 03/09/2020            Lab Results   Component Value Date     LABVLDL 18.4 07/09/2022     LABVLDL 29 03/09/2020            Lab Results   Component Value Date     CHOLHDLRATIO 3.4 07/09/2022               Lab Results   Component Value Date     LABA1C 6.7 (H) 07/09/2022            Lab Results   Component Value Date  07/09/2022               Lab Results   Component Value Date      08/03/2022     K 3.9 08/03/2022      (H) 08/03/2022     CO2 24 08/03/2022     BUN 15 08/03/2022     CREATININE 0.70 (L) 08/03/2022     GLUCOSE 147 (H) 08/03/2022     CALCIUM 8.8 08/03/2022     PROT 7.5 08/03/2022     LABALBU 3.7 08/03/2022     BILITOT 0.7 08/03/2022     ALKPHOS 76 08/03/2022     AST 23 08/03/2022     ALT 38 08/03/2022     LABGLOM >60 08/03/2022     GFRAA >60 08/03/2022     AGRATIO 1.7 03/09/2020     GLOB 3.8 (H) 08/03/2022               Lab Results   Component Value Date/Time      08/03/2022 11:06 PM     K 3.9 08/03/2022 11:06 PM      08/03/2022 11:06 PM     CO2 24 08/03/2022 11:06 PM     BUN 15 08/03/2022 11:06 PM     CREATININE 0.70 08/03/2022 11:06 PM     GLUCOSE 147 08/03/2022 11:06 PM     CALCIUM 8.8 08/03/2022 11:06 PM               Lab Results   Component Value Date     WBC 8.2 08/03/2022     HGB 14.8 08/03/2022     HCT 46.5 08/03/2022     MCV 93.0 08/03/2022      (L) 08/03/2022                  ASSESSMENT:     Gloria Crowder was seen today for hypertension and coronary artery disease. Diagnoses and all orders for this visit:     HFrEF (heart failure with reduced ejection fraction) ([facility]) -LVEF normalized post flutter ablation. Nonrheumatic aortic valve stenosis -repeat echo 10/2022 with LVEF 55-60%. Mean aortic valve gradient is 41 mmHg. Patient has now completed TAVR work-up. He has mild to moderate diffuse pattern coronary artery disease. Risk benefits of TAVR were discussed with the patient. He has been evaluated by CT surgery and felt moderate to high risk for open surgical procedure. Patient is aware of risk for pacemaker, CVA, MI, vascular injury, bleeding, death. He is willing to proceed. Coronary artery disease involving native coronary artery of native heart with other form of angina pectoris ([facility] -all to moderate by cardiac catheterization 07/2022.   Continue aspirin and atorvastatin     Essential hypertension, benign -well-controlled on medications above. Dyslipidemia -continue atorvastatin     Typical atrial flutter ([address] -patient is stable status post ablation. Currently anticoagulated with Eliquis. Patient has been holding this 72 hours prior to procedure.     Sherry Sharma MD

## 2022-11-08 NOTE — BRIEF OP NOTE
Brief Postoperative Note      Patient: Sebastien Rizvi  YOB: 1950  MRN: [id number]    Date of Procedure: 11/8/2022    Pre-Op Diagnosis: Aortic valve stenosis, etiology of cardiac valve disease unspecified [I35.0]    Post-Op Diagnosis: Same       Procedure(s):  Transcatheter aortic valve replacement  TRANSCATHETER AORTIC VALVE REPLACEMENT    Surgeon(s):  MD Dora Claros MD Zena Backer, MD    Assistant:  * No surgical staff found *    Anesthesia: Monitor Anesthesia Care    Estimated Blood Loss (mL): Minimal    Complications: None    Specimens:   * No specimens in log *    Implants:  Implant Name Type Inv.  Item Serial No.  Lot No. LRB No. Used Action   VALVE AORT 29MM ANNULUS [id number] SUP ANNULAR SELF EXP - [id number] Aortic valves VALVE AORT 29MM ANNULUS [id number] SUP ANNULAR SELF EXP [id number] [employer] [place] [employer]WD  N/A 1 Implanted         Drains: * No LDAs found *    Findings: as    Electronically signed by Manisha Sargent MD on 11/8/2022 at 10:54 AM

## 2022-11-09 ENCOUNTER — APPOINTMENT (OUTPATIENT)
Dept: NON INVASIVE DIAGNOSTICS | Age: 72
DRG: 267 | End: 2022-11-09
Attending: INTERNAL MEDICINE
Payer: MEDICARE

## 2022-11-09 VITALS
BODY MASS INDEX: 34.71 KG/M2 | WEIGHT: 234.35 LBS | OXYGEN SATURATION: 95 % | SYSTOLIC BLOOD PRESSURE: 147 MMHG | RESPIRATION RATE: 18 BRPM | DIASTOLIC BLOOD PRESSURE: 66 MMHG | HEIGHT: 69 IN | TEMPERATURE: 97.8 F | HEART RATE: 62 BPM

## 2022-11-09 LAB
ANION GAP SERPL CALC-SCNC: 6 MMOL/L (ref 2–11)
ANION GAP SERPL CALC-SCNC: ABNORMAL MMOL/L (ref 2–11)
BACTERIA SPEC CULT: NORMAL
BUN SERPL-MCNC: 22 MG/DL (ref 8–23)
BUN SERPL-MCNC: 25 MG/DL (ref 8–23)
CALCIUM SERPL-MCNC: 8.9 MG/DL (ref 8.3–10.4)
CALCIUM SERPL-MCNC: 9.2 MG/DL (ref 8.3–10.4)
CHLORIDE SERPL-SCNC: 106 MMOL/L (ref 101–110)
CHLORIDE SERPL-SCNC: 88 MMOL/L (ref 101–110)
CO2 SERPL-SCNC: 25 MMOL/L (ref 21–32)
CO2 SERPL-SCNC: 26 MMOL/L (ref 21–32)
CREAT SERPL-MCNC: 0.7 MG/DL (ref 0.8–1.5)
CREAT SERPL-MCNC: 0.9 MG/DL (ref 0.8–1.5)
ECHO AO ASC DIAM: 3.2 CM
ECHO AO ASCENDING AORTA INDEX: 1.45 CM/M2
ECHO AO ROOT DIAM: 3.1 CM
ECHO AO ROOT INDEX: 1.4 CM/M2
ECHO AV ACCELERATION TIME: 94 MS
ECHO AV AREA PEAK VELOCITY: 1.1 CM2
ECHO AV AREA VTI: 1.2 CM2
ECHO AV AREA/BSA PEAK VELOCITY: 0.5 CM2/M2
ECHO AV AREA/BSA VTI: 0.5 CM2/M2
ECHO AV MEAN GRADIENT: 18 MMHG
ECHO AV MEAN VELOCITY: 2 M/S
ECHO AV PEAK GRADIENT: 36 MMHG
ECHO AV PEAK VELOCITY: 3 M/S
ECHO AV VELOCITY RATIO: 0.47
ECHO AV VTI: 60.4 CM
ECHO BSA: 2.33 M2
ECHO EST RA PRESSURE: 3 MMHG
ECHO IVC PROX: 2 CM
ECHO LA AREA 2C: 29.5 CM2
ECHO LA AREA 4C: 22 CM2
ECHO LA DIAMETER INDEX: 2.26 CM/M2
ECHO LA DIAMETER: 5 CM
ECHO LA MAJOR AXIS: 7 CM
ECHO LA MINOR AXIS: 7.4 CM
ECHO LA TO AORTIC ROOT RATIO: 1.61
ECHO LA VOL 2C: 95 ML (ref 18–58)
ECHO LA VOL 4C: 57 ML (ref 18–58)
ECHO LA VOL BP: 75 ML (ref 18–58)
ECHO LA VOL/BSA BIPLANE: 34 ML/M2 (ref 16–34)
ECHO LA VOLUME INDEX A2C: 43 ML/M2 (ref 16–34)
ECHO LA VOLUME INDEX A4C: 26 ML/M2 (ref 16–34)
ECHO LV E' LATERAL VELOCITY: 8 CM/S
ECHO LV E' SEPTAL VELOCITY: 6 CM/S
ECHO LV EDV A2C: 123 ML
ECHO LV EDV A4C: 106 ML
ECHO LV EDV INDEX A4C: 48 ML/M2
ECHO LV EDV NDEX A2C: 56 ML/M2
ECHO LV EJECTION FRACTION A2C: 57 %
ECHO LV EJECTION FRACTION A4C: 58 %
ECHO LV EJECTION FRACTION BIPLANE: 57 % (ref 55–100)
ECHO LV ESV A2C: 53 ML
ECHO LV ESV A4C: 45 ML
ECHO LV ESV INDEX A2C: 24 ML/M2
ECHO LV ESV INDEX A4C: 20 ML/M2
ECHO LV FRACTIONAL SHORTENING: 35 % (ref 28–44)
ECHO LV INTERNAL DIMENSION DIASTOLE INDEX: 2.44 CM/M2
ECHO LV INTERNAL DIMENSION DIASTOLIC: 5.4 CM (ref 4.2–5.9)
ECHO LV INTERNAL DIMENSION SYSTOLIC INDEX: 1.58 CM/M2
ECHO LV INTERNAL DIMENSION SYSTOLIC: 3.5 CM
ECHO LV IVSD: 1.1 CM (ref 0.6–1)
ECHO LV MASS 2D: 249.4 G (ref 88–224)
ECHO LV MASS INDEX 2D: 112.9 G/M2 (ref 49–115)
ECHO LV POSTERIOR WALL DIASTOLIC: 1.2 CM (ref 0.6–1)
ECHO LV RELATIVE WALL THICKNESS RATIO: 0.44
ECHO LVOT AREA: 2.3 CM2
ECHO LVOT AV VTI INDEX: 0.54
ECHO LVOT DIAM: 1.7 CM
ECHO LVOT MEAN GRADIENT: 4 MMHG
ECHO LVOT PEAK GRADIENT: 8 MMHG
ECHO LVOT PEAK VELOCITY: 1.4 M/S
ECHO LVOT STROKE VOLUME INDEX: 33.6 ML/M2
ECHO LVOT SV: 74.2 ML
ECHO LVOT VTI: 32.7 CM
ECHO MV A VELOCITY: 1.5 M/S
ECHO MV AREA VTI: 1.6 CM2
ECHO MV E DECELERATION TIME (DT): 272 MS
ECHO MV E VELOCITY: 1.06 M/S
ECHO MV E/A RATIO: 0.71
ECHO MV E/E' LATERAL: 13.25
ECHO MV E/E' RATIO (AVERAGED): 15.46
ECHO MV E/E' SEPTAL: 17.67
ECHO MV LVOT VTI INDEX: 1.45
ECHO MV MAX VELOCITY: 1.5 M/S
ECHO MV MEAN GRADIENT: 4 MMHG
ECHO MV MEAN VELOCITY: 0.9 M/S
ECHO MV PEAK GRADIENT: 9 MMHG
ECHO MV VTI: 47.4 CM
ECHO PV ACCELERATION TIME (AT): 74 MS
ECHO PV MAX VELOCITY: 1.5 M/S
ECHO PV PEAK GRADIENT: 9 MMHG
ECHO RV BASAL DIMENSION: 3.6 CM
ECHO RV FREE WALL PEAK S': 12 CM/S
ECHO RV INTERNAL DIMENSION: 3.2 CM
ECHO RV TAPSE: 2.9 CM (ref 1.7–?)
ERYTHROCYTE [DISTWIDTH] IN BLOOD BY AUTOMATED COUNT: 15.2 % (ref 11.9–14.6)
GLUCOSE SERPL-MCNC: 137 MG/DL (ref 65–100)
GLUCOSE SERPL-MCNC: 227 MG/DL (ref 65–100)
HCT VFR BLD AUTO: 40.6 % (ref 41.1–50.3)
HGB BLD-MCNC: 13.4 G/DL (ref 13.6–17.2)
LV EF: 58 %
LVEF MODALITY: ABNORMAL
MCH RBC QN AUTO: 29.5 PG (ref 26.1–32.9)
MCHC RBC AUTO-ENTMCNC: 33 G/DL (ref 31.4–35)
MCV RBC AUTO: 89.2 FL (ref 82–102)
NRBC # BLD: 0 K/UL (ref 0–0.2)
PLATELET # BLD AUTO: 145 K/UL (ref 150–450)
PMV BLD AUTO: 10.9 FL (ref 9.4–12.3)
POTASSIUM SERPL-SCNC: 3.8 MMOL/L (ref 3.5–5.1)
POTASSIUM SERPL-SCNC: 3.8 MMOL/L (ref 3.5–5.1)
RBC # BLD AUTO: 4.55 M/UL (ref 4.23–5.6)
SERVICE CMNT-IMP: NORMAL
SODIUM SERPL-SCNC: 137 MMOL/L (ref 133–143)
SODIUM SERPL-SCNC: 83 MMOL/L (ref 133–143)
WBC # BLD AUTO: 11.4 K/UL (ref 4.3–11.1)

## 2022-11-09 PROCEDURE — 36415 COLL VENOUS BLD VENIPUNCTURE: CPT

## 2022-11-09 PROCEDURE — 85027 COMPLETE CBC AUTOMATED: CPT

## 2022-11-09 PROCEDURE — 93321 DOPPLER ECHO F-UP/LMTD STD: CPT | Performed by: INTERNAL MEDICINE

## 2022-11-09 PROCEDURE — 80048 BASIC METABOLIC PNL TOTAL CA: CPT

## 2022-11-09 PROCEDURE — 2500000003 HC RX 250 WO HCPCS: Performed by: INTERNAL MEDICINE

## 2022-11-09 PROCEDURE — 99238 HOSP IP/OBS DSCHRG MGMT 30/<: CPT | Performed by: INTERNAL MEDICINE

## 2022-11-09 PROCEDURE — 93306 TTE W/DOPPLER COMPLETE: CPT

## 2022-11-09 PROCEDURE — 93308 TTE F-UP OR LMTD: CPT | Performed by: INTERNAL MEDICINE

## 2022-11-09 PROCEDURE — 93325 DOPPLER ECHO COLOR FLOW MAPG: CPT | Performed by: INTERNAL MEDICINE

## 2022-11-09 PROCEDURE — 6370000000 HC RX 637 (ALT 250 FOR IP): Performed by: INTERNAL MEDICINE

## 2022-11-09 PROCEDURE — 6360000002 HC RX W HCPCS: Performed by: INTERNAL MEDICINE

## 2022-11-09 PROCEDURE — 2580000003 HC RX 258: Performed by: INTERNAL MEDICINE

## 2022-11-09 PROCEDURE — 93306 TTE W/DOPPLER COMPLETE: CPT | Performed by: INTERNAL MEDICINE

## 2022-11-09 PROCEDURE — 6370000000 HC RX 637 (ALT 250 FOR IP): Performed by: PHYSICIAN ASSISTANT

## 2022-11-09 RX ADMIN — SACUBITRIL AND VALSARTAN 1 TABLET: 24; 26 TABLET, FILM COATED ORAL at 08:55

## 2022-11-09 RX ADMIN — APIXABAN 5 MG: 5 TABLET, FILM COATED ORAL at 08:55

## 2022-11-09 RX ADMIN — ASPIRIN 81 MG: 81 TABLET, CHEWABLE ORAL at 08:54

## 2022-11-09 RX ADMIN — CEFAZOLIN SODIUM 2000 MG: 100 INJECTION, POWDER, LYOPHILIZED, FOR SOLUTION INTRAVENOUS at 01:36

## 2022-11-09 RX ADMIN — Medication 1 AMPULE: at 08:56

## 2022-11-09 RX ADMIN — SODIUM CHLORIDE, PRESERVATIVE FREE 10 ML: 5 INJECTION INTRAVENOUS at 08:57

## 2022-11-09 RX ADMIN — CARVEDILOL 12.5 MG: 12.5 TABLET, FILM COATED ORAL at 08:55

## 2022-11-09 ASSESSMENT — PAIN SCALES - GENERAL: PAINLEVEL_OUTOF10: 0

## 2022-11-09 NOTE — ANESTHESIA POSTPROCEDURE EVALUATION
Department of Anesthesiology  Postprocedure Note    Patient: Coral Mohr  MRN: [id number]  YOB: 1950  Date of evaluation: 11/8/2022      Procedure Summary     Date: 11/08/22 Room / Location: [facility] MAIN OR  / [facility] MAIN OR    Anesthesia Start: 0910 Anesthesia Stop: 1129    Procedures:       Transcatheter aortic valve replacement ([place])      Coronary angiography Diagnosis:       Aortic valve stenosis, etiology of cardiac valve disease unspecified      (Aortic valve stenosis, etiology of cardiac valve disease unspecified [I35.0])    Providers: Wanda High MD Responsible Provider: Lin Singh MD    Anesthesia Type: General ASA Status: 4          Anesthesia Type: General    Davian Phase I: Davian Score: 9    Davian Phase II:        Anesthesia Post Evaluation    Patient location during evaluation: bedside  Patient participation: complete - patient participated  Level of consciousness: awake and alert  Pain score: 1  Airway patency: patent  Nausea & Vomiting: no vomiting  Complications: no  Cardiovascular status: vasoactive/inotropes and hemodynamically stable  Respiratory status: acceptable  Hydration status: euvolemic

## 2022-11-09 NOTE — PROGRESS NOTES
[facility] CARDIOLOGY PROGRESS NOTE           11/9/2022 7:12 AM    Admit Date: 11/8/2022      Subjective:   Hemodynamics have been stable overnight. Bilateral groin access sites have healed well. Patient is without complaints. ROS:  Cardiovascular:  As noted above    Objective:      Vitals:    11/09/22 0200 11/09/22 0215 11/09/22 0230 11/09/22 0545   BP: (!) 122/56      Pulse: 63 62 61    Resp: 13 15 14    Temp:       TempSrc:       SpO2: 90% 92% 92%    Weight:    234 lb 5.6 oz (106.3 kg)   Height:           Physical Exam:  General-No Acute Distress  Neck- supple, no JVD  CV- regular rate and rhythm no MRG  Lung- clear bilaterally  Abd- soft, nontender, nondistended  Ext- no edema bilaterally. Skin- warm and dry    Data Review:   Recent Labs     11/07/22  0839 11/09/22  0548     --    K 4.3  --    MG 2.1  --    BUN 15  --    WBC 8.4 11.4*   HGB 15.1 13.4*   HCT 46.4 40.6*    145*   INR 1.1  --        Assessment/Plan:     Principal Problem:    S/P TAVR (transcatheter aortic valve replacement)  Plan: Patient is clinically stable overnight. Continue aspirin 81 mg daily and start Eliquis 5 mg twice daily. Echocardiogram scheduled for today. If patient remains clinically stable and echocardiogram demonstrates normal valve function and no complications patient will be stable for discharge later today. Active Problems:    Dyslipidemia  Plan: Continue atorvastatin 80 mg daily    Essential hypertension, benign  Plan: Patient with hypotension surrounding procedure. This is resolved. We will continue carvedilol and Entresto as hemodynamics are stable currently. Atrial flutter ([address]  Plan: Patient is sinus rhythm and resume Eliquis    CAD (coronary artery disease)  Plan: Patient with very diffuse pattern moderate CAD. Needs aggressive risk reduction. Continue high intensity statin therapy. Hemodynamics have been stable. Patient will need to focus on diet and weight loss. Patient will focus on diabetic management.   He will referred to cardiac rehab post TAVR        Dana Mckeon MD  11/9/2022 7:12 AM

## 2022-11-09 NOTE — PROGRESS NOTES
Cardiac Rehab: Spoke with patient regarding referral to cardiac rehab. Patient meets admission criteria based on TAVR (11/8/22). Written information about Cardiac Rehab given and reviewed with patient. Discussed lifestyle modifications to promote cardiac wellness. Patient indicated that he wants to participate in the cardiac rehab program and his orientation has been scheduled.  His Cardiologist is Dr. Baljinder Owusu.      Thank you,  Windsor Canavan, BSN, RN  Cardiopulmonary Rehabilitation Nurse Liaison  [facility]

## 2022-11-09 NOTE — DISCHARGE SUMMARY
Patient seen and examined by me. Agree with above note by physician extender. Key findings are: Patient status post TAVR. Hemodynamics are stable. Echocardiogram with normal TAVR function. Bilateral access sites have healed well. Vitals:    11/09/22 0740 11/09/22 0855 11/09/22 0904 11/09/22 1003   BP: (!) 150/67 (!) 163/75 (!) 171/72 (!) 147/66   Pulse: 66 69 67 62   Resp: 17 21 18   Temp: 97.8 °F (36.6 °C)      TempSrc: Oral      SpO2: 95%  96% 95%   Weight:       Height:            General: Patient is alert and oriented, no apparent distress  HEENT: Pupils equal reactive, oropharynx clear  Chest: Clear to auscultation bilaterally  Cardiovascular: S1-S2 regular with no murmur  Abdomen: Soft positive bowel sounds  Extremities: Soft no edema with intact distal pulses    Principal Problem:    S/P TAVR (transcatheter aortic valve replacement)  Plan: Patient stable on aspirin and Eliquis. Echocardiogram with normal TAVR function. Patient stable discharge home with  7 follow-up. Active Problems: Aortic valve stenosis  Plan: Status post TAVR    Dyslipidemia  Plan: Continue high intensity statin therapy    Essential hypertension, benign  Plan: Hemodynamics are stable continue home medical management. Atrial flutter ([address]  Plan: In sinus rhythm and on Eliquis. CAD (coronary artery disease)  Plan: Moderate diffuse pattern CAD which would be managed medically. Milagros Mark MD                    [facility] Cardiology Discharge Summary     Patient ID:  Sosa Murillo  [id number]  20 y.o.  1950    Admit date: 11/8/2022    Discharge date:  11/9/2022    Admitting Physician: Lina Garcias MD     Discharge Physician: Lazaro Mcdaniel PA-C/Dr. Gabi William    Admission Diagnoses:  Aortic stenosis, severe [I35.0]  S/P TAVR (transcatheter aortic valve replacement) [Z95.2]    Discharge Diagnoses:   Patient Active Problem List    Diagnosis Date Noted    Acute on chronic systolic (congestive) heart failure ([facility] 07/08/2022    Aortic stenosis, severe 11/08/2022    S/P TAVR (transcatheter aortic valve replacement) 09/21/2022    HFrEF (heart failure with reduced ejection fraction) ([facility] 08/26/2022    Acute congestive heart failure ([facility]     Other psoriasis 09/15/2013    Essential hypertension, benign 09/15/2013    Carotid artery stenosis 07/11/2013    Dyslipidemia 06/30/2012    Atrial flutter ([facility] 06/30/2012    CAD (coronary artery disease) 06/30/2012    Psoriasis 06/26/2012       Cardiology Procedures this admission:  Transcatheter aortic valve replacement, echo  Consults: none    Hospital Course: Patient is a 81 y.o. obese WM with h/o HFrEF, WCT, atrial flutter s/p ablation, HTN, HLD, mild to moderate nonobstructive CAD and severe aortic stenosis. He was seen for TAVR consultation. Pt had mild to moderate exertional dyspnea. Repeat echo after atrial flutter ablation demonstrates normalization of LVEF with severe aortic stenosis with mean aortic valve gradient is 41 mmHg. Patient completed TAVR work-up. Risks and benefits of TAVR were discussed with the patient. He was evaluated by CT surgery and felt moderate to high risk for open surgical procedure. He was scheduled for TAVR on 11/8 and admitted for planned TAVR. The patient underwent successful balloon valvuloplasty and transcatheter aortic valve replacement with a 29 mm MDT transcatheter aortic valve via right common femoral artery percutaneous approach. The patient tolerated the procedure well and was taken to the telemetry floor for recovery. The morning of 11/9/22, patient was up feeling well without any complaints of chest pain or shortness of breath. Patient's labs were stable. Echocardiogram showed normal function of bioprosthesis. Left Ventricle: Normal left ventricular systolic function with a visually estimated EF of 55 - 60%. Left ventricle size is normal. Mildly increased wall thickness. Normal wall motion. Abnormal diastolic function. Aortic Valve: 29 mm Medtronic evolute pro plus No paravalvular regurgitation. AV mean gradient is 18 mmHg. AV peak gradient is 36 mmHg. LVOT:AV VTI Index is 0.54. Mitral Valve: Moderate annular calcification of the mitral valve. Mild regurgitation. Left Atrium: Left atrium is moderately dilated. Right Atrium: Right atrium is moderately dilated. Technical qualifiers: Color flow Doppler was performed and pulse wave and/or continuous wave Doppler was performed. Patient was seen and examined by Dr. Dave Weaver and determined stable and ready for discharge. Pt will continue aspirin 81 mg daily and start Eliquis 5 mg twice daily. Patient was instructed on the importance of medication compliance. The patient will have transitional care follow up with [facility] Dr. Dave Weaver for a TC 7 on Wed 11/16 at [address] [place] [address] office   A repeat echocardiogram will be obtained in 1 month on 12/7 at 8:30 [address] [place] [address]       DISPOSITION: The patient is being discharged home in stable condition on a low saturated fat, low cholesterol and low salt diet. The patient is instructed to advance activities as tolerated to the limit of fatigue or shortness of breath. The patient is instructed to avoid lifting anything heavier than 10 lbs for 2 weeks. The patient is instructed to avoid any straining, stooping or squatting for 2 weeks. The patient is instructed not to drive for 1 week. The patient is instructed to watch the groin site for bleeding/oozing; if seen, the patient is instructed to apply firm pressure with a clean cloth and call [facility] at [phone]. The patient is instructed to watch for signs of infection which include: increasing area of redness, fever/hot to touch or purulent drainage at the groin site. The patient is instructed not to soak in a bathtub for 7-10 days, but is cleared to shower.    The patient is instructed to return to the ER immediately for any severe pain, color change, or temperature change in leg. The patient is informed that prophylaxis for bacterial endocarditis is recommended for high-risk procedures such as all dental procedures that involve manipulation of gingival tissue, the periapical region of teeth, or perforation of the oral mucosa. Discharge Exam: BP (!) 171/72   Pulse 67   Temp 97.8 °F (36.6 °C) (Oral)   Resp 21   Ht 5' 9\" (1.753 m)   Wt 234 lb 5.6 oz (106.3 kg)   SpO2 96%   BMI 34.61 kg/m²   Patient has been seen by Dr. Sae Hickman: see his progress note for exam details.     Recent Results (from the past 24 hour(s))   POCT Blood Gas & Electrolytes    Collection Time: 11/08/22  9:59 AM   Result Value Ref Range    pH, Arterial, POC 7.37 7.35 - 7.45      pCO2, Arterial, POC 44.0 35 - 45 MMHG    pO2, Arterial, POC 68 (L) 75 - 100 MMHG    POC Sodium 142 136 - 145 MMOL/L    POC Potassium 4.6 3.5 - 5.1 MMOL/L    POC Ionized Calcium 1.18 1.12 - 1.32 mmol/L    POC Glucose 161 (H) 65 - 100 MG/DL    BASE DEFICIT (POC) 0.5 mmol/L    HCO3, Mixed 25.2 22 - 26 MMOL/L    POC TCO2 26 (H) 13 - 23 MMOL/L    POC O2 SAT 93 %    Source ARTERIAL      Performed by: Alberto ([facility])    POC Heparin Assay    Collection Time: 11/08/22 10:35 AM   Result Value Ref Range    [address]      Projected heparin concentration 1.6 mg/kg    Baseline  sec    Heparin Bolus-Patient 18,475 units    Heparin Bolus-Pump 0 units    Heparin Bolus-Total 18,475 units   POCT Blood Gas & Electrolytes    Collection Time: 11/08/22 10:36 AM   Result Value Ref Range    pH, Arterial, POC 7.35 7.35 - 7.45      pCO2, Arterial, POC 48.6 (H) 35 - 45 MMHG    pO2, Arterial,  (H) 75 - 100 MMHG    POC Sodium 142 136 - 145 MMOL/L    POC Potassium 4.3 3.5 - 5.1 MMOL/L    POC Ionized Calcium 1.18 1.12 - 1.32 mmol/L    POC Glucose 155 (H) 65 - 100 MG/DL    Base Excess 0.3 mmol/L    HCO3, Mixed 26.8 (H) 22 - 26 MMOL/L    POC TCO2 27 (H) 13 - 23 MMOL/L    POC O2  %    Source ARTERIAL Performed by: DanielleMarysvilleLizet    Transthoracic echocardiogram (TTE) limited with contrast, bubble, strain, and 3D PRN    Collection Time: 11/08/22 11:06 AM   Result Value Ref Range    IVSd 1.5 (A) 0.6 - 1.0 cm    LVIDd 5.4 4.2 - 5.9 cm    LVIDs 3.8 cm    LVOT Diameter 2.1 cm    LVPWd 1.3 (A) 0.6 - 1.0 cm    EF BP 61 55 - 100 %    LV Ejection Fraction A2C 65 %    LV Ejection Fraction A4C 54 %    LV EDV A2C 84 mL    LV EDV A4C 94 mL    LV EDV BP 90 67 - 155 mL    LV ESV A2C 29 mL    LV ESV A4C 43 mL    LV ESV BP 35 22 - 58 mL    LVOT Peak Gradient 4 mmHg    LVOT Mean Gradient 2 mmHg    LVOT SV 81.0 ml    LVOT Peak Velocity 1.0 m/s    LVOT VTI 23.4 cm    RVIDd 3.8 cm    AV Area by Peak Velocity 1.7 cm2    AV Area by VTI 1.8 cm2    AV AT 74.22 ms    AV Peak Gradient 19 mmHg    AV Mean Gradient 9 mmHg    AV Peak Velocity 2.2 m/s    AV Mean Velocity 1.4 m/s    AV VTI 47.6 cm    Aortic Root 3.4 cm    Body Surface Area 2.33 m2    Fractional Shortening 2D 30 28 - 44 %    LV ESV Index BP 15 mL/m2    LV EDV Index BP 40 mL/m2    LV ESV Index A4C 19 mL/m2    LV EDV Index A4C 42 mL/m2    LV ESV Index A2C 13 mL/m2    LV EDV Index A2C 37 mL/m2    LVIDd Index 2.39 cm/m2    LVIDs Index 1.68 cm/m2    LV RWT Ratio 0.48     LV Mass 2D 328.3 (A) 88 - 224 g    LV Mass 2D Index 145.3 (A) 49 - 115 g/m2    LVOT Stroke Volume Index 35.8 mL/m2    LVOT Area 3.5 cm2    Ao Root Index 1.50 cm/m2    AV Velocity Ratio 0.45     LVOT:AV VTI Index 0.49     RAMOS/BSA VTI 0.8 cm2/m2    RAMOS/BSA Peak Velocity 0.8 cm2/m2   POC Coagulation Time, Activated    Collection Time: 11/08/22 11:42 AM   Result Value Ref Range    ACT AVERAGE - AAVG 288 sec   POCT Glucose    Collection Time: 11/08/22  4:50 PM   Result Value Ref Range    POC Glucose 150 (H) 65 - 100 mg/dL    Performed by:  Alverto    POCT Glucose    Collection Time: 11/08/22  9:06 PM   Result Value Ref Range    POC Glucose 226 (H) 65 - 100 mg/dL    Performed by: Ovidio Linton Metabolic Panel w/ Reflex to MG    Collection Time: 11/09/22  5:48 AM   Result Value Ref Range    Sodium 83 (LL) 133 - 143 mmol/L    Potassium 3.8 3.5 - 5.1 mmol/L    Chloride 88 (L) 101 - 110 mmol/L    CO2 26 21 - 32 mmol/L    Anion Gap Cannot be calculated 2 - 11 mmol/L    Glucose 137 (H) 65 - 100 mg/dL    BUN 25 (H) 8 - 23 MG/DL    Creatinine 0.70 (L) 0.8 - 1.5 MG/DL    Est, Glom Filt Rate >60 >60 ml/min/1.73m2    Calcium 9.2 8.3 - 10.4 MG/DL   CBC    Collection Time: 11/09/22  5:48 AM   Result Value Ref Range    WBC 11.4 (H) 4.3 - 11.1 K/uL    RBC 4.55 4.23 - 5.6 M/uL    Hemoglobin 13.4 (L) 13.6 - 17.2 g/dL    Hematocrit 40.6 (L) 41.1 - 50.3 %    MCV 89.2 82 - 102 FL    MCH 29.5 26.1 - 32.9 PG    MCHC 33.0 31.4 - 35.0 g/dL    RDW 15.2 (H) 11.9 - 14.6 %    Platelets 840 (L) 490 - 450 K/uL    MPV 10.9 9.4 - 12.3 FL    nRBC 0.00 0.0 - 0.2 K/uL   Transthoracic echocardiogram (TTE) complete with contrast, bubble, strain, and 3D PRN    Collection Time: 11/09/22  8:17 AM   Result Value Ref Range    LV EDV A2C 123 mL    LV EDV A4C 106 mL    LV ESV A2C 53 mL    LV ESV A4C 45 mL    IVSd 1.1 (A) 0.6 - 1.0 cm    LVIDd 5.4 4.2 - 5.9 cm    LVIDs 3.5 cm    LVOT Diameter 1.7 cm    LVOT Mean Gradient 4 mmHg    LVOT VTI 32.7 cm    LVOT Peak Velocity 1.4 m/s    LVOT Peak Gradient 8 mmHg    LVPWd 1.2 (A) 0.6 - 1.0 cm    LV E' Lateral Velocity 8 cm/s    LV E' Septal Velocity 6 cm/s    LV Ejection Fraction A2C 57 %    LV Ejection Fraction A4C 58 %    EF BP 57 55 - 100 %    LVOT Area 2.3 cm2    LVOT SV 74.2 ml    LA Minor Axis 7.4 cm    LA Major Ismay 7.0 cm    LA Area 2C 29.5 cm2    LA Area 4C 22.0 cm2    LA Volume 2C 95 (A) 18 - 58 mL    LA Volume 4C 57 18 - 58 mL    LA Volume BP 75 (A) 18 - 58 mL    LA Diameter 5.0 cm    AV AT 94.00 ms    AV Mean Velocity 2.0 m/s    AV Mean Gradient 18 mmHg    AV Mean Gradient 18 mmHg    AV VTI 60.4 cm    AV Peak Velocity 3.0 m/s    AV Peak Gradient 36 mmHg    AV Area by VTI 1.2 cm2    AV Area by Peak Velocity 1.1 cm2    Aortic Root 3.1 cm    Ascending Aorta 3.2 cm    IVC Proxmal 2.0 cm    MV E Wave Deceleration Time 272.0 ms    MV A Velocity 1.50 m/s    MV E Velocity 1.06 m/s    MV Mean Gradient 4 mmHg    MV VTI 47.4 cm    MV Mean Velocity 0.9 m/s    MV Max Velocity 1.5 m/s    MV Peak Gradient 9 mmHg    MV Area by VTI 1.6 cm2    PV AT 74.0 ms    PV Max Velocity 1.5 m/s    PV Peak Gradient 9 mmHg    Est. RA Pressure 3 mmHg    RVIDd 3.2 cm    RV Basal Dimension 3.6 cm    RV Free Wall Peak S' 12 cm/s    TAPSE 2.9 1.7 cm    Body Surface Area 2.33 m2    Fractional Shortening 2D 35 28 - 44 %    LV ESV Index A4C 20 mL/m2    LV EDV Index A4C 48 mL/m2    LV ESV Index A2C 24 mL/m2    LV EDV Index A2C 56 mL/m2    LVIDd Index 2.44 cm/m2    LVIDs Index 1.58 cm/m2    LV RWT Ratio 0.44     LV Mass 2D 249.4 (A) 88 - 224 g    LV Mass 2D Index 112.9 49 - 115 g/m2    MV E/A 0.71     E/E' Ratio (Averaged) 15.46     E/E' Lateral 13.25     E/E' Septal 17.67     LA Volume Index BP 34 16 - 34 ml/m2    LVOT Stroke Volume Index 33.6 mL/m2    LA Volume Index 2C 43 (A) 16 - 34 mL/m2    LA Volume Index 4C 26 16 - 34 mL/m2    LA Size Index 2.26 cm/m2    LA/AO Root Ratio 1.61     Ao Root Index 1.40 cm/m2    Ascending Aorta Index 1.45 cm/m2    AV Velocity Ratio 0.47     LVOT:AV VTI Index 0.54     RAMOS/BSA VTI 0.5 cm2/m2    RAMOS/BSA Peak Velocity 0.5 cm2/m2    MV:LVOT VTI Index 1.45          Patient Instructions:   Current Discharge Medication List     CONTINUE these medications which have NOT CHANGED    Details   dapagliflozin (FARXIGA) 10 MG tablet Take 10 mg by mouth every morning      sacubitril-valsartan (ENTRESTO) 24-26 MG per tablet Take 1 tablet by mouth 2 times daily  Qty: 60 tablet, Refills: 3      nitroGLYCERIN (NITROSTAT) 0.4 MG SL tablet Place 1 tablet under the tongue every 5 minutes as needed for Chest pain up to max of 3 total doses. If no relief after 1 dose, call 911.   Qty: 25 tablet, Refills: 3      apixaban (ELIQUIS) 5 MG TABS tablet Take 5 mg by mouth 2 times daily      aspirin 81 MG chewable tablet Take 81 mg by mouth daily      atorvastatin (LIPITOR) 80 MG tablet Take 80 mg by mouth at bedtime TAKE 1 TABLET BY MOUTH EVERY DAY      carvedilol (COREG) 12.5 MG tablet Take 12.5 mg by mouth 2 times daily (with meals)      metFORMIN (GLUCOPHAGE) 1000 MG tablet Take 1,000 mg by mouth 2 times daily (with meals)           Dr. Esa Lepe, PA-C  11/9/2022  9:32 AM

## 2022-11-09 NOTE — PROGRESS NOTES
Discharge instructions and follow up appt information given to and reviewed with patient. Pt stable at time of d/c. Opportunity for questions provided, pt voiced understanding. All pt belongings taken with pt from room. Pt transported to discharge area via wheelchair, family member driving pt home.

## 2022-11-09 NOTE — CARE COORDINATION
Pt with discharge orders this day. Chart screened by  for discharge planning. No CM needs identified at time of discharge. Milestones met.           11/08/22 0482   Condition of Participation: Discharge Planning   The Plan for Transition of Care is related to the following treatment goals: Home

## 2022-11-09 NOTE — DISCHARGE INSTRUCTIONS
HOME INSTRUCTION FOLLOWING TRANSCATHETER AORTIC VALVE REPLACEMENT (TAVR)      What is my main problem? You have just had your diseased aortic valve replaced with an artificial valve. Below is what you need to do when you go home. What do I need to do? ACTIVITY:  · Weigh yourself every day in the morning after using the bathroom. · Get up and get dressed every day. Do not stay in bed. · Set a daily routine. Have someone stay with you for the first 5 days, do not be alone for long periods of time  · NO DRIVING For 5 days or until you are no longer sore. You may ride in a car. · Please let your doctor know if you need to leave town before your first follow-up visit. · Do NOT lift more than ten pounds, No Straining, Stooping, or Squatting for one week. · You may walk up and down a few steps but don't do a lot of stairs  · Walk as much as you can. When you are tired, rest.  · Cough and deep breath and use your incentive spirometer 10 times each hour when you are awake. We encourage Cardiac rehab, this usually STARTS a couple weeks after your TAVR      DIET:  · We recommend the Cardiac diet. Your nurse can provide a printed handout to you on this diet. INCISION CARE:  · Shower every day. You may shower after you go home  No tub baths for the first week you are at home. · Cleanse wounds with mild soap and water, pat dry. Keep wounds dry. If you need to add a Band-Aid you can but no ointments, powders, neosporin, or lotions. · A small amount of bloody or clear drainage is normal.  · Watch for signs of infection: redness, incision hot to the touch, fever greater than 101 degrees, swelling at the groin incision site, or discolored drainage from your incision. MEDICATIONS:  · DO NOT STOP TAKING YOUR MEDICINES WITHOUT SPEAKING WITH YOUR CARDIOLOGIST! · Take your pills as ordered at the time of discharge. · Do  not stop taking any medicine without first discussing it with your doctor.   · Avoid all over the counter medications, herbal, and natural supplements unless you have discussed them with your doctor. · It is important to follow your doctor's orders, especially if blood thinning drugs are prescribed. Your doctor will monitor your medicine and advise you when or if you can stop taking it. Why is it important for me to do this? · Regular check-ups by your cardiologist are very important. It is easier for patients with a heart valve replacement to get infections, which could lead to future heart damage. · Before any dental work or surgery is done, tell your dentist or surgeon about your heart valve replacement. Wait for 3-4 months to have any dental work completed. You may need to take antibiotics before and after some medical procedures to reduce the risk of infection. · Always tell the doctor (or medical technician) that you have an implanted heart valve. · Before an MRI (magnetic resonance imaging) procedure, always notify the doctor (or medical technician) that you have an implanted heart valve. What can I expect? HEALTHY HABITS: To maintain a healthy physical condition, we have the following suggestions:  · No smoking!!! If you need help to stop smoking, please contact your doctor. · Attempt to achieve and maintain your ideal body weight. · Walking is great exercise for the body and the mind! We suggest that you walk as much as you can. When to call the doctor or speak with the nurse? · Weight gain of 3 pounds in one day or 5 pounds in one week. Weight gain is NOT normal.  · Swelling of the legs, ankles, or feet  · Increasing shortness of breath  · Change in the color or temperature of your lower legs and feet. · Develop abdominal pain or unrelieved nausea and/or vomiting. · Develop any numbness, tingling, or limited movement of your arms or legs.   · Signs of infection: redness, incision hot to the touch, fevers greater than 101 degrees, or colored drainage from your incision. · Seroma is an accumulation of fluid in the tissues at the groin surgical site. Symptoms may include: a lump near the groin surgical site, clear fluid draining from the site, redness, warmth, or swelling. ADDITIONAL INFORMATION:  · Your follow-up echocardiogram has been scheduled along with your 30-day TAVR follow up visit. On this visit you will also have nonfasting lab work drawn at the office. This visit is very important for you, so be sure to attend this visit. We are here for you! If you have any questions, please feel free to call us.  Office numbers are:    Dr. Healy Counter Dr. Fatuma Blanchard  [phone]                   Valve Coordinator- Airam Lewis - [phone]

## 2022-11-09 NOTE — NURSE NAVIGATOR
Reviewed DC instructions and FU appt with pt. Contact information given for pt to call if any concerns after discharge.  Pt miguel Garvin

## 2022-11-10 ENCOUNTER — TELEPHONE (OUTPATIENT)
Dept: CARDIOLOGY CLINIC | Age: 72
End: 2022-11-10

## 2022-11-10 NOTE — TELEPHONE ENCOUNTER
Transitional Care fu after DC from [facility] 11/9/22 s/p TAVR. I spoke w/pt. he is feeling great. No signs of infection. Has no new prescriptions. I went over DC summary including diet,activity,post TAVR care,dental prophylaxis and fu appt. 11/16/MA. All questions answered and pt.advised to call PRN and vu.

## 2022-11-11 DIAGNOSIS — Z95.2 S/P TAVR (TRANSCATHETER AORTIC VALVE REPLACEMENT): Primary | ICD-10-CM

## 2022-11-11 LAB
ABO + RH BLD: NORMAL
BLD PROD TYP BPU: NORMAL
BLOOD BANK DISPENSE STATUS: NORMAL
BLOOD GROUP ANTIBODIES SERPL: NORMAL
BPU ID: NORMAL
CROSSMATCH RESULT: NORMAL
SPECIMEN EXP DATE BLD: NORMAL
UNIT DIVISION: 0

## 2022-11-15 ENCOUNTER — TELEPHONE (OUTPATIENT)
Dept: CASE MANAGEMENT | Age: 72
End: 2022-11-15

## 2022-11-15 NOTE — TELEPHONE ENCOUNTER
Spoke to pt for follow up after discharging home s/p TAVR. Pt states groin sites are without redness, pain, or warm to touch. Dressing to groin sites bilaterally have been removed, no bleeding or ecchymosis currently. Encouraged to contact me or the office with any pain, redness, or s/sx of infection so arrangements can be made for pt to be seen in the office. Discussed upcoming appts for discharge follow up, 30 day echo and follow up, as well as labs requested prior to appts. Orders for labs are in the computer, no appt required, and non fasting. Contact information provided, no further complaints/concerns voiced and questions answered.     Jeanne Subramanian, Structural Heart Navigator

## 2022-11-16 ENCOUNTER — OFFICE VISIT (OUTPATIENT)
Dept: CARDIOLOGY CLINIC | Age: 72
End: 2022-11-16

## 2022-11-16 VITALS
WEIGHT: 246 LBS | SYSTOLIC BLOOD PRESSURE: 138 MMHG | DIASTOLIC BLOOD PRESSURE: 72 MMHG | HEIGHT: 69 IN | BODY MASS INDEX: 36.43 KG/M2 | HEART RATE: 62 BPM

## 2022-11-16 DIAGNOSIS — I50.20 HFREF (HEART FAILURE WITH REDUCED EJECTION FRACTION) (HCC): ICD-10-CM

## 2022-11-16 DIAGNOSIS — I10 ESSENTIAL HYPERTENSION, BENIGN: ICD-10-CM

## 2022-11-16 DIAGNOSIS — I48.3 TYPICAL ATRIAL FLUTTER (HCC): ICD-10-CM

## 2022-11-16 DIAGNOSIS — Z95.2 S/P TAVR (TRANSCATHETER AORTIC VALVE REPLACEMENT): Primary | Chronic | ICD-10-CM

## 2022-11-16 DIAGNOSIS — I25.118 CORONARY ARTERY DISEASE INVOLVING NATIVE CORONARY ARTERY OF NATIVE HEART WITH OTHER FORM OF ANGINA PECTORIS (HCC): ICD-10-CM

## 2022-11-16 DIAGNOSIS — E78.5 DYSLIPIDEMIA: ICD-10-CM

## 2022-11-16 DIAGNOSIS — I65.29 STENOSIS OF CAROTID ARTERY, UNSPECIFIED LATERALITY: ICD-10-CM

## 2022-11-16 PROBLEM — I50.23 ACUTE ON CHRONIC SYSTOLIC (CONGESTIVE) HEART FAILURE (HCC): Status: RESOLVED | Noted: 2022-07-08 | Resolved: 2022-11-16

## 2022-11-16 PROBLEM — I35.0 AORTIC VALVE STENOSIS: Status: RESOLVED | Noted: 2022-11-08 | Resolved: 2022-11-16

## 2022-11-16 ASSESSMENT — ENCOUNTER SYMPTOMS
BACK PAIN: 0
ABDOMINAL PAIN: 0
SHORTNESS OF BREATH: 0
COUGH: 0

## 2022-11-16 NOTE — PROGRESS NOTES
[facility] CARDIOLOGY  [address] [facility] [address] [address], [address]      22      NAME:  Liz Carlisle  : 1950  MRN: [id number]      SUBJECTIVE:   Liz Carlisle is a 81 y.o. male seen for a follow up visit regarding the following:     Chief Complaint   Patient presents with    Congestive Heart Failure    Hypertension    Coronary Artery Disease       HPI:   81 y.o. male with recent diagnosis of heart failure with reduced ejection fraction, wide-complex tachycardia that was noted to be atrial flutter now status post ablation, hypertension, dyslipidemia, mild to moderate nonobstructive CAD and severe aortic stenosis. LVEF normalized post ablation. Patient is now status post TAVR 2022 with 29 mm Medtronic evolute pro plus valve. He presents today for TC 7 follow-up. Please see nursing documentation to address medication reconciliation discharge diagnoses. Patient complains of some left lower extremity discomfort with ambulation that appears to be coming and going. Overall he feels great. He denies any chest pain. He is [place] heart association class II          Past Medical History, Past Surgical History, Family history, Social History, and Medications were all reviewed with the patient today and updated as necessary. Current Outpatient Medications   Medication Sig Dispense Refill    dapagliflozin (FARXIGA) 10 MG tablet Take 10 mg by mouth every morning      sacubitril-valsartan (ENTRESTO) 24-26 MG per tablet Take 1 tablet by mouth 2 times daily 60 tablet 3    nitroGLYCERIN (NITROSTAT) 0.4 MG SL tablet Place 1 tablet under the tongue every 5 minutes as needed for Chest pain up to max of 3 total doses.  If no relief after 1 dose, call 911. 25 tablet 3    apixaban (ELIQUIS) 5 MG TABS tablet Take 5 mg by mouth 2 times daily      aspirin 81 MG chewable tablet Take 81 mg by mouth daily      atorvastatin (LIPITOR) 80 MG tablet Take 80 mg by mouth at bedtime TAKE 1 TABLET BY MOUTH EVERY DAY      carvedilol (COREG) 12.5 MG tablet Take 12.5 mg by mouth 2 times daily (with meals)      metFORMIN (GLUCOPHAGE) 1000 MG tablet Take 1,000 mg by mouth 2 times daily (with meals)       No current facility-administered medications for this visit. Patient Active Problem List    Diagnosis Date Noted    S/P TAVR (transcatheter aortic valve replacement) 09/21/2022     Priority: Medium     7/2022: 29 mm Medtronic evolute Pro plus      HFrEF (heart failure with reduced ejection fraction) ([facility] 08/26/2022     Priority: Medium    Other psoriasis 09/15/2013     Priority: Low    Essential hypertension, benign 09/15/2013     Priority: Low    Carotid artery stenosis 07/11/2013     Priority: Low     7/11/13 (Dr. Lupe Jett carotid endarterectomy with bovine pericardium   patch. Dyslipidemia 06/30/2012     Priority: Low    Atrial flutter ([facility] 06/30/2012     Priority: Low    CAD (coronary artery disease) 06/30/2012     Priority: Low    Psoriasis 06/26/2012     Priority: Low      Family History   Problem Relation Age of Onset    Diabetes Mother     Heart Disease Mother     Hypertension Mother     Cancer Father     No Known Problems Sister     No Known Problems Sister      Social History     Tobacco Use    Smoking status: Former     Types: Cigars    Smokeless tobacco: Never   Substance Use Topics    Alcohol use: Yes     Comment: rarely       Review Of Symptoms    Review of Systems   Constitutional: Negative for fever and malaise/fatigue. HENT:  Negative for nosebleeds. Cardiovascular:  Negative for chest pain, dyspnea on exertion and palpitations. Respiratory:  Negative for cough and shortness of breath. Musculoskeletal:  Negative for back pain, muscle cramps, muscle weakness and myalgias. Gastrointestinal:  Negative for abdominal pain. Neurological:  Negative for dizziness. Physical Exam  Blood pressure 138/72, pulse 62, height 5' 9\" (1.753 m), weight 246 lb (111.6 kg).   Physical Exam  HENT:      Head: Normocephalic and atraumatic. Eyes:      Extraocular Movements: Extraocular movements intact. Cardiovascular:      Rate and Rhythm: Normal rate and regular rhythm. Heart sounds: No murmur heard. Pulmonary:      Effort: Pulmonary effort is normal.      Breath sounds: Normal breath sounds. Abdominal:      Palpations: Abdomen is soft. Musculoskeletal:         General: Normal range of motion. Skin:     General: Skin is warm and dry. Neurological:      General: No focal deficit present. Mental Status: He is oriented to person, place, and time. Medical problems, medical history and test results were reviewed with the patient today.       Lab Results   Component Value Date    CHOL 99 07/09/2022    CHOL 150 03/09/2020     Lab Results   Component Value Date    TRIG 92 07/09/2022    TRIG 147 03/09/2020     Lab Results   Component Value Date    HDL 29 (L) 07/09/2022    HDL 28 (L) 03/09/2020     Lab Results   Component Value Date    LDLCALC 51.6 07/09/2022    LDLCALC 93 03/09/2020     Lab Results   Component Value Date    LABVLDL 18.4 07/09/2022    LABVLDL 29 03/09/2020     Lab Results   Component Value Date    CHOLHDLRATIO 3.4 07/09/2022        Lab Results   Component Value Date    LABA1C 6.4 (H) 11/07/2022     Lab Results   Component Value Date     11/07/2022        Lab Results   Component Value Date     11/09/2022    K 3.8 11/09/2022     11/09/2022    CO2 25 11/09/2022    BUN 22 11/09/2022    CREATININE 0.90 11/09/2022    GLUCOSE 227 (H) 11/09/2022    CALCIUM 8.9 11/09/2022    PROT 7.9 11/07/2022    LABALBU 3.7 11/07/2022    BILITOT 0.8 11/07/2022    ALKPHOS 74 11/07/2022    AST 19 11/07/2022    ALT 27 11/07/2022    LABGLOM >60 11/09/2022    GFRAA >60 08/03/2022    AGRATIO 1.7 03/09/2020    GLOB 4.2 11/07/2022       Lab Results   Component Value Date/Time     11/09/2022 09:05 AM    K 3.8 11/09/2022 09:05 AM     11/09/2022 09:05 AM    CO2 25 11/09/2022 09:05 AM    BUN 22 11/09/2022 09:05 AM    CREATININE 0.90 11/09/2022 09:05 AM    GLUCOSE 227 11/09/2022 09:05 AM    CALCIUM 8.9 11/09/2022 09:05 AM        Lab Results   Component Value Date    WBC 11.4 (H) 11/09/2022    HGB 13.4 (L) 11/09/2022    HCT 40.6 (L) 11/09/2022    MCV 89.2 11/09/2022     (L) 11/09/2022             ASSESSMENT:    Dieter Joshua was seen today for congestive heart failure, hypertension and coronary artery disease. Diagnoses and all orders for this visit:    S/P TAVR (transcatheter aortic valve replacement) -status post TAVR with 29 mm Medtronic evolute pro + 11/2022. He is stable on aspirin and Eliquis. Patient has some left lower extremity discomfort with ambulation. If this continues we will need arterial duplex. Patient is [place] heart association class II CHF at this time. Patient return in approximately 2 to 3 weeks for echocardiogram and follow-up. HFrEF (heart failure with reduced ejection fraction) (HCC) -LVEF normalized post atrial flutter ablation. Medications remain appropriate with Entresto 24/26 twice daily and carvedilol 12.5 mg twice daily. Essential hypertension, benign -blood pressure remains well controlled on Entresto and carvedilol as above. Coronary artery disease involving native coronary artery of native heart with other form of angina pectoris [facility]) -patient has diffuse pattern CAD. Currently on aggressive CV risk reduction. Patient should participate in cardiac rehab post TAVR. Dyslipidemia -continue high intensity statin therapy with atorvastatin 80 mg daily. Typical atrial flutter (HCC) -patient status post ablation with no recurrence. Remains on Eliquis.     Stenosis of carotid artery, unspecified laterality        Problem List Items Addressed This Visit          Circulatory    S/P TAVR (transcatheter aortic valve replacement) - Primary (Chronic)    HFrEF (heart failure with reduced ejection fraction) (HCC)    Carotid artery stenosis    Essential hypertension, benign    Atrial flutter (HCC)    CAD (coronary artery disease)       Other    Dyslipidemia       There are no discontinued medications. Patient has been instructed and agrees to call our office with any issues or other concerns related to their cardiac condition(s) and/or complaint(s). Return for Return for 30-day TAVR echocardiogram and follow-up. Arnoldo Pineda MD  11/16/2022

## 2022-11-29 ENCOUNTER — HOSPITAL ENCOUNTER (OUTPATIENT)
Dept: CARDIAC REHAB | Age: 72
Setting detail: RECURRING SERIES
Discharge: HOME | End: 2022-12-02

## 2022-11-29 DIAGNOSIS — Z95.2 S/P TAVR (TRANSCATHETER AORTIC VALVE REPLACEMENT): ICD-10-CM

## 2022-11-29 LAB
ANION GAP SERPL CALC-SCNC: 7 MMOL/L (ref 2–11)
BUN SERPL-MCNC: 15 MG/DL (ref 8–23)
CALCIUM SERPL-MCNC: 9.7 MG/DL (ref 8.3–10.4)
CHLORIDE SERPL-SCNC: 108 MMOL/L (ref 101–110)
CO2 SERPL-SCNC: 26 MMOL/L (ref 21–32)
CREAT SERPL-MCNC: 0.7 MG/DL (ref 0.8–1.5)
ERYTHROCYTE [DISTWIDTH] IN BLOOD BY AUTOMATED COUNT: 14.9 % (ref 11.9–14.6)
GLUCOSE SERPL-MCNC: 112 MG/DL (ref 65–100)
HCT VFR BLD AUTO: 44.1 % (ref 41.1–50.3)
HGB BLD-MCNC: 14 G/DL (ref 13.6–17.2)
MCH RBC QN AUTO: 29.6 PG (ref 26.1–32.9)
MCHC RBC AUTO-ENTMCNC: 31.7 G/DL (ref 31.4–35)
MCV RBC AUTO: 93.2 FL (ref 82–102)
NRBC # BLD: 0 K/UL (ref 0–0.2)
PLATELET # BLD AUTO: 141 K/UL (ref 150–450)
PMV BLD AUTO: 10.6 FL (ref 9.4–12.3)
POTASSIUM SERPL-SCNC: 4.6 MMOL/L (ref 3.5–5.1)
RBC # BLD AUTO: 4.73 M/UL (ref 4.23–5.6)
SODIUM SERPL-SCNC: 141 MMOL/L (ref 133–143)
WBC # BLD AUTO: 7.4 K/UL (ref 4.3–11.1)

## 2022-11-29 ASSESSMENT — PATIENT HEALTH QUESTIONNAIRE - PHQ9
9. THOUGHTS THAT YOU WOULD BE BETTER OFF DEAD, OR OF HURTING YOURSELF: 0
3. TROUBLE FALLING OR STAYING ASLEEP: 2
6. FEELING BAD ABOUT YOURSELF - OR THAT YOU ARE A FAILURE OR HAVE LET YOURSELF OR YOUR FAMILY DOWN: 0
10. IF YOU CHECKED OFF ANY PROBLEMS, HOW DIFFICULT HAVE THESE PROBLEMS MADE IT FOR YOU TO DO YOUR WORK, TAKE CARE OF THINGS AT HOME, OR GET ALONG WITH OTHER PEOPLE: 0
2. FEELING DOWN, DEPRESSED OR HOPELESS: 0
4. FEELING TIRED OR HAVING LITTLE ENERGY: 0
8. MOVING OR SPEAKING SO SLOWLY THAT OTHER PEOPLE COULD HAVE NOTICED. OR THE OPPOSITE, BEING SO FIGETY OR RESTLESS THAT YOU HAVE BEEN MOVING AROUND A LOT MORE THAN USUAL: 0
SUM OF ALL RESPONSES TO PHQ QUESTIONS 1-9: 2
SUM OF ALL RESPONSES TO PHQ QUESTIONS 1-9: 2
1. LITTLE INTEREST OR PLEASURE IN DOING THINGS: 0
5. POOR APPETITE OR OVEREATING: 0
SUM OF ALL RESPONSES TO PHQ9 QUESTIONS 1 & 2: 0
7. TROUBLE CONCENTRATING ON THINGS, SUCH AS READING THE NEWSPAPER OR WATCHING TELEVISION: 0
SUM OF ALL RESPONSES TO PHQ QUESTIONS 1-9: 2
SUM OF ALL RESPONSES TO PHQ QUESTIONS 1-9: 2

## 2022-11-29 ASSESSMENT — EJECTION FRACTION: EF_VALUE: 55

## 2022-11-29 ASSESSMENT — LIFESTYLE VARIABLES: SMOKELESS_TOBACCO: NO

## 2022-11-29 NOTE — CARDIO/PULMONARY
Dear Dr. Sophy Saucedo:    Thank you for referring your patient, Gladis Rizvi (: 1950), to the Cardiopulmonary Rehabilitation Program at [facility].  Mr. [address] is a good candidate for the Cardiac Rehab Program and should see improvements with regular participation. We will be addressing appropriate interventions for modifiable risk factors with your patient during the next 12 weeks. We will contact you with any issues or concerns that may arise, or you can follow your patients progress through [address] [place] [address] at any time. A final summary will be available on [facility] when the program is completed. Again, thank you for your referral. If we can be of further assistance, please feel free to contact the Cardiopulmonary Rehab staff at [phone].     Sincerely,    SP Lindo, RN  Cardiopulmonary Rehabilitation Nurse  [facility]

## 2022-12-05 NOTE — TELEPHONE ENCOUNTER
MEDICATION REFILL REQUEST      Name of Medication:  Entresto   Dose:  24-26 mg  Frequency:    Quantity:    Days' supply:  90      Pharmacy Name/Location:  [facility]

## 2022-12-06 ENCOUNTER — HOSPITAL ENCOUNTER (OUTPATIENT)
Dept: CARDIAC REHAB | Age: 72
Setting detail: RECURRING SERIES
Discharge: HOME | End: 2022-12-09
Payer: MEDICARE

## 2022-12-06 VITALS — OXYGEN SATURATION: 97 % | BODY MASS INDEX: 36.79 KG/M2 | HEIGHT: 69 IN | WEIGHT: 248.4 LBS

## 2022-12-06 PROCEDURE — 93798 PHYS/QHP OP CAR RHAB W/ECG: CPT

## 2022-12-06 ASSESSMENT — LIFESTYLE VARIABLES: SMOKELESS_TOBACCO: NO

## 2022-12-06 ASSESSMENT — EXERCISE STRESS TEST
PEAK_RPE: 15
PEAK_METS: 2.3
PEAK_RPD: 0.5
PEAK_HR: 90
PEAK_RPE: 15
PEAK_HR: 90
PEAK_BP: 184/88

## 2022-12-06 ASSESSMENT — NEW YORK HEART ASSOCIATION (NYHA) CLASSIFICATION: NYHA FUNCTIONAL CLASS: CLASS II

## 2022-12-06 ASSESSMENT — EJECTION FRACTION: EF_VALUE: 55

## 2022-12-07 ENCOUNTER — HOSPITAL ENCOUNTER (OUTPATIENT)
Dept: CARDIAC REHAB | Age: 72
Setting detail: RECURRING SERIES
Discharge: HOME | End: 2022-12-10
Payer: MEDICARE

## 2022-12-07 ENCOUNTER — OFFICE VISIT (OUTPATIENT)
Dept: CARDIOLOGY CLINIC | Age: 72
End: 2022-12-07
Payer: MEDICARE

## 2022-12-07 VITALS
HEIGHT: 69 IN | BODY MASS INDEX: 37.03 KG/M2 | WEIGHT: 250 LBS | DIASTOLIC BLOOD PRESSURE: 80 MMHG | SYSTOLIC BLOOD PRESSURE: 156 MMHG | HEART RATE: 66 BPM

## 2022-12-07 DIAGNOSIS — I65.29 STENOSIS OF CAROTID ARTERY, UNSPECIFIED LATERALITY: ICD-10-CM

## 2022-12-07 DIAGNOSIS — I48.3 TYPICAL ATRIAL FLUTTER (HCC): ICD-10-CM

## 2022-12-07 DIAGNOSIS — Z95.2 S/P TAVR (TRANSCATHETER AORTIC VALVE REPLACEMENT): Chronic | ICD-10-CM

## 2022-12-07 DIAGNOSIS — M79.605 LEFT LEG PAIN: Chronic | ICD-10-CM

## 2022-12-07 DIAGNOSIS — I50.20 HFREF (HEART FAILURE WITH REDUCED EJECTION FRACTION) (HCC): ICD-10-CM

## 2022-12-07 DIAGNOSIS — I25.118 CORONARY ARTERY DISEASE INVOLVING NATIVE CORONARY ARTERY OF NATIVE HEART WITH OTHER FORM OF ANGINA PECTORIS (HCC): ICD-10-CM

## 2022-12-07 DIAGNOSIS — E78.5 DYSLIPIDEMIA: ICD-10-CM

## 2022-12-07 DIAGNOSIS — I10 ESSENTIAL HYPERTENSION, BENIGN: Primary | ICD-10-CM

## 2022-12-07 PROCEDURE — 3017F COLORECTAL CA SCREEN DOC REV: CPT | Performed by: INTERNAL MEDICINE

## 2022-12-07 PROCEDURE — 3078F DIAST BP <80 MM HG: CPT | Performed by: INTERNAL MEDICINE

## 2022-12-07 PROCEDURE — G8484 FLU IMMUNIZE NO ADMIN: HCPCS | Performed by: INTERNAL MEDICINE

## 2022-12-07 PROCEDURE — 93000 ELECTROCARDIOGRAM COMPLETE: CPT | Performed by: INTERNAL MEDICINE

## 2022-12-07 PROCEDURE — 3074F SYST BP LT 130 MM HG: CPT | Performed by: INTERNAL MEDICINE

## 2022-12-07 PROCEDURE — 99214 OFFICE O/P EST MOD 30 MIN: CPT | Performed by: INTERNAL MEDICINE

## 2022-12-07 PROCEDURE — G8428 CUR MEDS NOT DOCUMENT: HCPCS | Performed by: INTERNAL MEDICINE

## 2022-12-07 PROCEDURE — 1123F ACP DISCUSS/DSCN MKR DOCD: CPT | Performed by: INTERNAL MEDICINE

## 2022-12-07 PROCEDURE — 1036F TOBACCO NON-USER: CPT | Performed by: INTERNAL MEDICINE

## 2022-12-07 PROCEDURE — 1111F DSCHRG MED/CURRENT MED MERGE: CPT | Performed by: INTERNAL MEDICINE

## 2022-12-07 PROCEDURE — 93798 PHYS/QHP OP CAR RHAB W/ECG: CPT

## 2022-12-07 PROCEDURE — G8417 CALC BMI ABV UP PARAM F/U: HCPCS | Performed by: INTERNAL MEDICINE

## 2022-12-07 ASSESSMENT — KANSAS CITY CARDIOMYOPATHY QUESTIONNAIRE (KCCQ12)
6. OVER THE PAST 2 WEEKS, HOW MUCH HAS YOUR HEART FAILURE LIMITED YOUR ENJOYMENT OF LIFE: 5
3. OVER THE PAST 2 WEEKS, ON AVERAGE, HOW MANY TIMES HAS FATIGUE LIMITED YOUR ABILITY TO DO WHAT YOU WANTED: 7
7. IF YOU HAD TO SPEND THE REST OF YOUR LIFE WITH YOUR HEART FAILURE THE WAY IT IS RIGHT NOW, HOW WOULD YOU FEEL ABOUT THIS?: 4
1C. OVER THE PAST 2 WEEKS, HOW MUCH WERE YOU LIMITED BY HEART FAILURE SYMPTOMS (SHORTNESS OF BREATH OR FATIGUE) WHEN HURRYING OR JOGGING (AS IF TO CATCH A BUS): 6
8B. OVER THE PAST 2 WEEKS, ON AVERAGE, HOW HAS HEART FAILURE LIMITED YOU ABILITY TO WORK OR DO HOUSEHOLD CHORES: 5
8C. OVER THE PAST 2 WEEKS, ON AVERAGE, HOW HAS HEART FAILURE LIMITED YOU ABILITY TO VISIT FAMILY AND FRIENDS OUR OF YOUR HOME: 5
2. OVER THE PAST 2 WEEKS, HOW MANY TIMES DID YOU HAVE SWELLING IN YOUR FEET, ANKLES OR LEGS WHEN YOU WOKE UP IN THE MORNING: 5
1B. OVER THE PAST 2 WEEKS, HOW MUCH WERE YOU LIMITED BY HEART FAILURE SYMPTOMS (SHORTNESS OF BREATH OR FATIGUE) WHEN WALKING 1 BLOCK ON LEVEL GROUND: 6
1A. OVER THE PAST 2 WEEKS, HOW MUCH WERE YOU LIMITED BY HEART FAILURE SYMPTOMS (SHORTNESS OF BREATH OR FATIGUE) WHEN SHOWERING OR BATHING: 5
8A. OVER THE PAST 2 WEEKS, ON AVERAGE, HOW HAS HEART FAILURE LIMITED YOU ABILITY TO DO HOBBIES OR RECREATIONAL ACTIVITIES: 5
4. OVER THE PAST 2 WEEKS, ON AVERAGE, HOW MANY TIMES HAS SHORTNESS OF BREATH LIMITED YOUR ABILITY TO DO WHAT YOU WANTED: 7
5. OVER THE PAST 2 WEEKS, ON AVERAGE, HOW MANY TIMES HAVE YOU BEEN FORCED TO SLEEP SITTING UP IN A CHAIR OR WITH AT LEAST 3 PILLOWS TO PROP YOU UP BECAUSE OF SHORTNESS OF BREATH?: 5

## 2022-12-07 ASSESSMENT — EXERCISE STRESS TEST
PEAK_HR: 85
PEAK_METS: 2.3
PEAK_BP: 164/76

## 2022-12-07 ASSESSMENT — ENCOUNTER SYMPTOMS
SHORTNESS OF BREATH: 0
COUGH: 0
BACK PAIN: 0
ABDOMINAL PAIN: 0

## 2022-12-07 NOTE — PROGRESS NOTES
[facility] CARDIOLOGY  [address] [place] [address], [address] [address]  [address]      22      NAME:  Cordella Cowden  : 1950  MRN: [id number]      SUBJECTIVE:   Cordella Cowden is a 81 y.o. male seen for a follow up visit regarding the following:     Chief Complaint   Patient presents with    Hypertension    Coronary Artery Disease       HPI:   81 y.o. male with recent diagnosis of heart failure with reduced ejection fraction, wide-complex tachycardia that was noted to be atrial flutter now status post ablation, hypertension, dyslipidemia, mild to moderate nonobstructive CAD and severe aortic stenosis. LVEF normalized post ablation. Patient is now status post TAVR 2022 with 29 mm Medtronic evolute pro plus valve. He presents today for 30 day follow-up. Patient complains of some left lower extremity discomfort with ambulation that appears to be coming and going. Overall he feels great. He denies any chest pain. He is [place] heart association class II. Echocardiogram today demonstrates normal left ventricular systolic function and normal TAVR function. EKG demonstrates stable left bundle branch block. Past Medical History, Past Surgical History, Family history, Social History, and Medications were all reviewed with the patient today and updated as necessary. Current Outpatient Medications   Medication Sig Dispense Refill    sacubitril-valsartan (ENTRESTO) 24-26 MG per tablet Take 1 tablet by mouth 2 times daily 180 tablet 3    dapagliflozin (FARXIGA) 10 MG tablet Take 10 mg by mouth every morning Pt taking Farxiga until runs out then will restart Jardiance. nitroGLYCERIN (NITROSTAT) 0.4 MG SL tablet Place 1 tablet under the tongue every 5 minutes as needed for Chest pain up to max of 3 total doses.  If no relief after 1 dose, call 911. 25 tablet 3    apixaban (ELIQUIS) 5 MG TABS tablet Take 5 mg by mouth 2 times daily      aspirin 81 MG chewable tablet Take 81 mg by mouth daily      atorvastatin (LIPITOR) 80 MG tablet Take 80 mg by mouth at bedtime TAKE 1 TABLET BY MOUTH EVERY DAY      carvedilol (COREG) 12.5 MG tablet Take 12.5 mg by mouth 2 times daily (with meals)      metFORMIN (GLUCOPHAGE) 1000 MG tablet Take 1,000 mg by mouth 2 times daily (with meals)       No current facility-administered medications for this visit. Patient Active Problem List    Diagnosis Date Noted    Left leg pain 2022     Priority: High    S/P TAVR (transcatheter aortic valve replacement) 2022     Priority: Medium     2022: 29 mm Medtronic evolute Pro plus      HFrEF (heart failure with reduced ejection fraction) ([facility] 2022     Priority: Medium    Other psoriasis 09/15/2013     Priority: Low    Essential hypertension, benign 09/15/2013     Priority: Low    Carotid artery stenosis 2013     Priority: Low     13 (Dr. Lawrence Villalobos carotid endarterectomy with bovine pericardium   patch. Dyslipidemia 2012     Priority: Low    Atrial flutter ([facility] 2012     Priority: Low    CAD (coronary artery disease) 2012     Priority: Low    Psoriasis 2012     Priority: Low      Family History   Problem Relation Age of Onset    Diabetes Mother     Heart Disease Mother     Hypertension Mother     Cancer Father     No Known Problems Sister     No Known Problems Sister      Social History     Tobacco Use    Smoking status: Former     Types: Cigars     Quit date: 2021     Years since quittin.5    Smokeless tobacco: Never    Tobacco comments:     Never smoked cigarettes. Substance Use Topics    Alcohol use: Yes     Comment: rarely       Review Of Symptoms    Review of Systems   Constitutional: Negative for fever and malaise/fatigue. HENT:  Negative for nosebleeds. Cardiovascular:  Negative for chest pain, dyspnea on exertion and palpitations. Respiratory:  Negative for cough and shortness of breath.     Musculoskeletal:  Negative for back pain, muscle cramps, muscle weakness and myalgias. Gastrointestinal:  Negative for abdominal pain. Neurological:  Negative for dizziness. Physical Exam  Blood pressure (!) 156/80, pulse 66, height 5' 9\" (1.753 m), weight 250 lb (113.4 kg). Physical Exam  HENT:      Head: Normocephalic and atraumatic. Eyes:      Extraocular Movements: Extraocular movements intact. Cardiovascular:      Rate and Rhythm: Normal rate and regular rhythm. Heart sounds: No murmur heard. Pulmonary:      Effort: Pulmonary effort is normal.      Breath sounds: Normal breath sounds. Abdominal:      Palpations: Abdomen is soft. Musculoskeletal:         General: Normal range of motion. Skin:     General: Skin is warm and dry. Neurological:      General: No focal deficit present. Mental Status: He is oriented to person, place, and time. Medical problems, medical history and test results were reviewed with the patient today.       Lab Results   Component Value Date    CHOL 99 07/09/2022    CHOL 150 03/09/2020     Lab Results   Component Value Date    TRIG 92 07/09/2022    TRIG 147 03/09/2020     Lab Results   Component Value Date    HDL 29 (L) 07/09/2022    HDL 28 (L) 03/09/2020     Lab Results   Component Value Date    LDLCALC 51.6 07/09/2022    LDLCALC 93 03/09/2020     Lab Results   Component Value Date    LABVLDL 18.4 07/09/2022    LABVLDL 29 03/09/2020     Lab Results   Component Value Date    CHOLHDLRATIO 3.4 07/09/2022        Lab Results   Component Value Date    LABA1C 6.4 (H) 11/07/2022     Lab Results   Component Value Date     11/07/2022        Lab Results   Component Value Date     11/29/2022    K 4.6 11/29/2022     11/29/2022    CO2 26 11/29/2022    BUN 15 11/29/2022    CREATININE 0.70 (L) 11/29/2022    GLUCOSE 112 (H) 11/29/2022    CALCIUM 9.7 11/29/2022    PROT 7.9 11/07/2022    LABALBU 3.7 11/07/2022    BILITOT 0.8 11/07/2022    ALKPHOS 74 11/07/2022    AST 19 11/07/2022    ALT 27 unspecified laterality    Left leg pain - worse since TAVR and worse with walking. Check left lower extremity arterial duplex. Problem List Items Addressed This Visit          Circulatory    S/P TAVR (transcatheter aortic valve replacement) (Chronic)    HFrEF (heart failure with reduced ejection fraction) (HCC)    Carotid artery stenosis    Essential hypertension, benign - Primary    Relevant Orders    EKG 12 Lead (Completed)    Atrial flutter (HCC)    CAD (coronary artery disease)       Other    Left leg pain (Chronic)    Relevant Orders    Vascular duplex lower extremity arteries left with TIFFANIE    Dyslipidemia       There are no discontinued medications. Patient has been instructed and agrees to call our office with any issues or other concerns related to their cardiac condition(s) and/or complaint(s). Return in about 3 months (around 3/7/2023).        Sheryl Bowers MD  12/7/2022

## 2022-12-13 ENCOUNTER — TELEPHONE (OUTPATIENT)
Dept: CARDIOLOGY CLINIC | Age: 72
End: 2022-12-13

## 2022-12-13 ENCOUNTER — TELEPHONE (OUTPATIENT)
Dept: CARDIAC REHAB | Age: 72
End: 2022-12-13

## 2022-12-13 DIAGNOSIS — M79.605 LEFT LEG PAIN: Primary | ICD-10-CM

## 2022-12-13 DIAGNOSIS — I70.202 OCCLUSION OF LEFT FEMORAL ARTERY (HCC): ICD-10-CM

## 2022-12-13 NOTE — TELEPHONE ENCOUNTER
I called and explained 's response. He voiced understanding.      Orders Placed This Encounter   Procedures    CTA ABDOMINAL AORTA W BILAT RUNOFF W WO CONTRAST     Standing Status:   Future     Standing Expiration Date:   12/13/2023     Order Specific Question:   STAT Creatinine as needed:     Answer:   Yes     Order Specific Question:   Reason for exam:     Answer:   see Dx

## 2022-12-13 NOTE — TELEPHONE ENCOUNTER
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

## 2022-12-13 NOTE — TELEPHONE ENCOUNTER
Please call patient regarding his test results . He said he had talked to someone but has some questions.  Please call patient

## 2022-12-13 NOTE — TELEPHONE ENCOUNTER
It may be related to the surgery. He also had moderate plaque lower in that vessel. This CT will better define whether this is thrombus related to the procedure or potential chronic plaque. Patient may continue to walk with rest as needed.   He should have a CT scheduled with a follow-up with vascular surgery

## 2022-12-13 NOTE — TELEPHONE ENCOUNTER
Pt called back and stated that he is worried about the blockage that he has and wants to know if the surgery caused this and if so why. He would also like to know if he should take it easy until he sees radiology. Difficult to image the CFA but there appears to be heterogeneous plaque within; there is limited color filling suggesting possible sub-total/total occlusion, possible collateral flow to the CFA. SFA appears to fill via retrograde flow from the PFA and collateral flow. No significant stenosis identified in the popliteal artery, PTA, peroneal arteries. Segment of the distal PABLO visually appears narrowed with turbulent color flow and increased velocities relative to more proximal segments.

## 2022-12-14 ENCOUNTER — APPOINTMENT (OUTPATIENT)
Dept: CARDIAC REHAB | Age: 72
End: 2022-12-14
Payer: MEDICARE

## 2022-12-16 ENCOUNTER — TELEPHONE (OUTPATIENT)
Dept: CARDIOLOGY CLINIC | Age: 72
End: 2022-12-16

## 2022-12-16 ENCOUNTER — HOSPITAL ENCOUNTER (OUTPATIENT)
Dept: CARDIAC REHAB | Age: 72
Setting detail: RECURRING SERIES
End: 2022-12-16
Payer: MEDICARE

## 2022-12-16 NOTE — TELEPHONE ENCOUNTER
Patient called stating he had TAVR on 12/7 and has known blockage in leg. Patient would like to know if it would be OK to drive to [place] and back 5-6 hours sitting combined. Please call and advise.

## 2022-12-16 NOTE — TELEPHONE ENCOUNTER
Left leg pain 12/07/2022       Priority: High    S/P TAVR (transcatheter aortic valve replacement) 09/21/2022       Priority: Medium       7/2022: 29 mm Medtronic evolute Pro plus       HFrEF (heart failure with reduced ejection fraction) ([facility] 08/26/2022       Priority: Medium    Other psoriasis 09/15/2013       Priority: Low    Essential hypertension, benign 09/15/2013       Priority: Low    Carotid artery stenosis 07/11/2013       Priority: Low       7/11/13 (Dr. [place] carotid endarterectomy with bovine pericardium   patch.           Dyslipidemia 06/30/2012       Priority: Low    Atrial flutter ([facility] 06/30/2012       Priority: Low    CAD (coronary artery disease) 06/30/2012       Priority: Low    Psoriasis 06/26/2012       Priority: Low

## 2022-12-22 ENCOUNTER — HOSPITAL ENCOUNTER (OUTPATIENT)
Dept: CT IMAGING | Age: 72
Discharge: HOME | End: 2022-12-22
Payer: MEDICARE

## 2022-12-22 DIAGNOSIS — I70.202 OCCLUSION OF LEFT FEMORAL ARTERY (HCC): ICD-10-CM

## 2022-12-22 DIAGNOSIS — M79.605 LEFT LEG PAIN: ICD-10-CM

## 2022-12-22 PROCEDURE — 75635 CT ANGIO ABDOMINAL ARTERIES: CPT

## 2022-12-22 PROCEDURE — 2580000003 HC RX 258: Performed by: INTERNAL MEDICINE

## 2022-12-22 PROCEDURE — 6360000004 HC RX CONTRAST MEDICATION: Performed by: INTERNAL MEDICINE

## 2022-12-22 RX ORDER — 0.9 % SODIUM CHLORIDE 0.9 %
100 INTRAVENOUS SOLUTION INTRAVENOUS ONCE
Status: COMPLETED | OUTPATIENT
Start: 2022-12-22 | End: 2022-12-22

## 2022-12-22 RX ORDER — SODIUM CHLORIDE 0.9 % (FLUSH) 0.9 %
10 SYRINGE (ML) INJECTION
Status: COMPLETED | OUTPATIENT
Start: 2022-12-22 | End: 2022-12-22

## 2022-12-22 RX ADMIN — IOPAMIDOL 100 ML: 755 INJECTION, SOLUTION INTRAVENOUS at 10:57

## 2022-12-22 RX ADMIN — SODIUM CHLORIDE 100 ML: 9 INJECTION, SOLUTION INTRAVENOUS at 10:57

## 2022-12-22 RX ADMIN — SODIUM CHLORIDE, PRESERVATIVE FREE 10 ML: 5 INJECTION INTRAVENOUS at 10:57

## 2022-12-23 ENCOUNTER — APPOINTMENT (OUTPATIENT)
Dept: CARDIAC REHAB | Age: 72
End: 2022-12-23
Payer: MEDICARE

## 2022-12-28 ENCOUNTER — APPOINTMENT (OUTPATIENT)
Dept: CARDIAC REHAB | Age: 72
End: 2022-12-28
Payer: MEDICARE

## 2022-12-29 ENCOUNTER — OFFICE VISIT (OUTPATIENT)
Dept: VASCULAR SURGERY | Age: 72
End: 2022-12-29
Payer: MEDICARE

## 2022-12-29 ENCOUNTER — PREP FOR PROCEDURE (OUTPATIENT)
Dept: VASCULAR SURGERY | Age: 72
End: 2022-12-29

## 2022-12-29 ENCOUNTER — TELEPHONE (OUTPATIENT)
Dept: CARDIOLOGY CLINIC | Age: 72
End: 2022-12-29

## 2022-12-29 VITALS
WEIGHT: 249 LBS | SYSTOLIC BLOOD PRESSURE: 174 MMHG | HEART RATE: 107 BPM | HEIGHT: 69 IN | DIASTOLIC BLOOD PRESSURE: 90 MMHG | OXYGEN SATURATION: 94 % | BODY MASS INDEX: 36.88 KG/M2

## 2022-12-29 DIAGNOSIS — I70.209 OCCLUSION OF COMMON FEMORAL ARTERY (HCC): Primary | ICD-10-CM

## 2022-12-29 PROCEDURE — G8484 FLU IMMUNIZE NO ADMIN: HCPCS | Performed by: STUDENT IN AN ORGANIZED HEALTH CARE EDUCATION/TRAINING PROGRAM

## 2022-12-29 PROCEDURE — 3078F DIAST BP <80 MM HG: CPT | Performed by: STUDENT IN AN ORGANIZED HEALTH CARE EDUCATION/TRAINING PROGRAM

## 2022-12-29 PROCEDURE — 3017F COLORECTAL CA SCREEN DOC REV: CPT | Performed by: STUDENT IN AN ORGANIZED HEALTH CARE EDUCATION/TRAINING PROGRAM

## 2022-12-29 PROCEDURE — 3074F SYST BP LT 130 MM HG: CPT | Performed by: STUDENT IN AN ORGANIZED HEALTH CARE EDUCATION/TRAINING PROGRAM

## 2022-12-29 PROCEDURE — 1036F TOBACCO NON-USER: CPT | Performed by: STUDENT IN AN ORGANIZED HEALTH CARE EDUCATION/TRAINING PROGRAM

## 2022-12-29 PROCEDURE — 99205 OFFICE O/P NEW HI 60 MIN: CPT | Performed by: STUDENT IN AN ORGANIZED HEALTH CARE EDUCATION/TRAINING PROGRAM

## 2022-12-29 PROCEDURE — G8417 CALC BMI ABV UP PARAM F/U: HCPCS | Performed by: STUDENT IN AN ORGANIZED HEALTH CARE EDUCATION/TRAINING PROGRAM

## 2022-12-29 PROCEDURE — 1123F ACP DISCUSS/DSCN MKR DOCD: CPT | Performed by: STUDENT IN AN ORGANIZED HEALTH CARE EDUCATION/TRAINING PROGRAM

## 2022-12-29 PROCEDURE — G8427 DOCREV CUR MEDS BY ELIG CLIN: HCPCS | Performed by: STUDENT IN AN ORGANIZED HEALTH CARE EDUCATION/TRAINING PROGRAM

## 2022-12-29 RX ORDER — NYSTATIN 100000 [USP'U]/G
POWDER TOPICAL
Qty: 1 EACH | Refills: 1 | Status: SHIPPED | OUTPATIENT
Start: 2022-12-29 | End: 2022-12-30

## 2022-12-29 NOTE — TELEPHONE ENCOUNTER
Cardiac Clearance        Physician or Practice Requesting:[facility]  : Jil Ortizricky Phone Number: [phone]  Fax Number: [phone]  Date of Surgery/Procedure: 01/06/2023  Type of Surgery or Procedure: CFA Endarterectomy  Type of Anesthesia: General  Type of Clearance Requested: Cardiac and med  Medication to Hold:Eliquis  Days to Hold: 2 days prior

## 2022-12-29 NOTE — PROGRESS NOTES
[facility]   [address] [facility] FAX: [phone]    Cesar Rizvi  : 1950      Reason for visit: Acute left leg claudication post left PCI    Chief Complaint: 81 y.o. male with left leg claudication that began POD 1 from TAVR with left femoral access and closure with Proglide perclose device. CTA  and Art DUS show short segment occlusion of the left CFA with reconstitution of the distal CFA via the profunda. Patient reports that claudication is lifestyle limiting. Plan: Nystatin powder for left groin redness. Plan for left femoral endarterectomy. Will obtain cardiac clearance from Dr. Belinda Casarez. Will need to hold Eliquis 2 days prior. Level 5 , Acute or chronic illness or injury that poses threat to life or bodily function. , and Elective major surgery with risk factors below    Imaging interpreted: CTA aorta with runoff, LE Art DUS    Smoker:  Tobacco Use      Smoking status: Former        Types: Cigars        Quit date: 2021        Years since quittin.5      Smokeless tobacco: Never      Tobacco comments: Never smoked cigarettes. Complexity of illness:  HTN, HLD, CHF, Afib, CAD, and Obesity    Procedures by [facility]SV[facility]:CEA Dino      Referred by: MD Yeimy Rosado MD     Statin: Yes     DAPT/AC: ASA and Eliquis    Dye allergy: no    Metal implants or AICD: no     Ambulatory status: With claudication at 50ft, and unable to walk past 50ft    Constitutional:   Negative for fevers and unexplained weight loss. Eyes:   Negative for visual disturbance. ENT:   Negative for significant hearing loss and tinnitus. Respiratory:   Negative for hemoptysis. Cardiovascular:   Negative except as noted in HPI. Gastrointestinal:   Negative for melena and abdominal pain. Genitourinary:   Negative for hematuria, renal stones.   Integumentary:   Negative for rash or non-healing wounds  Hematologic/Lymphatic:   Negative for excessive bleeding hx or clotting disorder. Musculoskeletal:  Negative for active, unexplained/severe joint pain. Neurological:   Negative for stroke. Behavioral/Psych:   Negative for suicidal ideations. Endocrine:   Negative for uncontrolled diabetic symptoms including polyuria, polydipsia and poor wound healing. Physical Examination:   Height: 5' 9\" (1.753 m), Weight: 249 lb (112.9 kg), BP: (!) 174/90    General:Well-developed. No distress. HENT: AT  CV: Regular rhythm. LUNG: Effort normal and breath sounds normal. No respiratory distress. Abdominal: Soft. Bowel sounds are normal. he exhibits no distension. There is no tenderness. There is no guarding. No hernia. Extremities:left groin crease with erythema and skin breakdown, without wounds, motor and sensation intact  Vascular: Radial:, L, 2+ , R, 2+  , Femoral:, L absent , R, 2+  , DP:, L, Dop , R, 2+      Savana Jacques MD    Elements of this note have been dictated using speech recognition software. As a result, errors of speech recognition may have occurred.

## 2022-12-30 ENCOUNTER — APPOINTMENT (OUTPATIENT)
Dept: CARDIAC REHAB | Age: 72
End: 2022-12-30
Payer: MEDICARE

## 2022-12-30 ENCOUNTER — HOSPITAL ENCOUNTER (OUTPATIENT)
Dept: PREADMISSION TESTING | Age: 72
Discharge: HOME | End: 2022-12-30
Payer: MEDICARE

## 2022-12-30 VITALS
HEART RATE: 66 BPM | OXYGEN SATURATION: 96 % | RESPIRATION RATE: 16 BRPM | HEIGHT: 69 IN | WEIGHT: 251.7 LBS | DIASTOLIC BLOOD PRESSURE: 70 MMHG | TEMPERATURE: 97.3 F | SYSTOLIC BLOOD PRESSURE: 158 MMHG | BODY MASS INDEX: 37.28 KG/M2

## 2022-12-30 PROBLEM — I70.209 OCCLUSION OF COMMON FEMORAL ARTERY (HCC): Status: ACTIVE | Noted: 2022-12-30

## 2022-12-30 LAB
ANION GAP SERPL CALC-SCNC: 8 MMOL/L (ref 2–11)
BUN SERPL-MCNC: 17 MG/DL (ref 8–23)
CALCIUM SERPL-MCNC: 9.2 MG/DL (ref 8.3–10.4)
CHLORIDE SERPL-SCNC: 104 MMOL/L (ref 101–110)
CO2 SERPL-SCNC: 27 MMOL/L (ref 21–32)
CREAT SERPL-MCNC: 0.62 MG/DL (ref 0.8–1.5)
ERYTHROCYTE [DISTWIDTH] IN BLOOD BY AUTOMATED COUNT: 14.6 % (ref 11.9–14.6)
GLUCOSE BLD STRIP.AUTO-MCNC: 163 MG/DL (ref 65–100)
GLUCOSE SERPL-MCNC: 170 MG/DL (ref 65–100)
HCT VFR BLD AUTO: 46.2 % (ref 41.1–50.3)
HGB BLD-MCNC: 14.9 G/DL (ref 13.6–17.2)
MCH RBC QN AUTO: 29.4 PG (ref 26.1–32.9)
MCHC RBC AUTO-ENTMCNC: 32.3 G/DL (ref 31.4–35)
MCV RBC AUTO: 91.1 FL (ref 82–102)
NRBC # BLD: 0 K/UL (ref 0–0.2)
PLATELET # BLD AUTO: 148 K/UL (ref 150–450)
PMV BLD AUTO: 10.5 FL (ref 9.4–12.3)
POTASSIUM SERPL-SCNC: 4.6 MMOL/L (ref 3.5–5.1)
RBC # BLD AUTO: 5.07 M/UL (ref 4.23–5.6)
SERVICE CMNT-IMP: ABNORMAL
SODIUM SERPL-SCNC: 139 MMOL/L (ref 133–143)
WBC # BLD AUTO: 6.6 K/UL (ref 4.3–11.1)

## 2022-12-30 PROCEDURE — 80048 BASIC METABOLIC PNL TOTAL CA: CPT

## 2022-12-30 PROCEDURE — 82962 GLUCOSE BLOOD TEST: CPT

## 2022-12-30 PROCEDURE — 85027 COMPLETE CBC AUTOMATED: CPT

## 2022-12-30 NOTE — PROGRESS NOTES
Patient verified name and     Order for consent NOT found in EHR; patient verified. Type 3 surgery, walk-in assessment complete. Labs per surgeon: NONE;   Labs per anesthesia protocol: CBC, BMP, T/S s/h dos, POC Glucose (163); results pending  [id number]    Cardiac clearance and med hold clearance pending. Pt had a TAVR 2022. Chart in box for anesthesia to review. Chart flagged for CN to follow up. Hospital approved surgical skin cleanser and instructions given per hospital policy. Patient provided with and instructed on educational handouts including Guide to Surgery, Pain Management, Hand Hygiene, Blood Transfusion Education, and [facility] Brochure. Patient answered medical/surgical history questions at their best of ability. All prior to admission medications documented in [facility]. Original medication prescription bottle NOT visualized during patient appointment. Patient instructed to hold all vitamins 7 days prior to surgery and NSAIDS 5 days prior to surgery, patient verbalized understanding. Patient teach back successful and patient demonstrates knowledge of instructions. PLEASE CONTINUE TAKING ALL PRESCRIPTION MEDICATIONS UP TO THE DAY OF SURGERY UNLESS OTHERWISE DIRECTED BELOW. DISCONTINUE all vitamins and supplements 7 days prior to surgery. DISCONTINUE Non-Steriodal Anti-Inflammatory (NSAIDS) such as Advil and Aleve 5 days prior to surgery. Home Medications to take  the day of surgery      Aspirin 81 mg      Nitroglycerin if needed      Carvedilol      Home Medications   to Hold     Eliquis-follow instructions as provided by the surgeon's office. Comments   Fely-hex shower the night before surgery and the morning of surgery. On the day before surgery please take Acetaminophen 1000mg in the morning and then again before bed. You may substitute for Tylenol 650 mg.            Please do not bring home medications with you on the day of surgery unless otherwise directed by your nurse. If you are instructed to bring home medications, please give them to your nurse as they will be administered by the nursing staff. If you have any questions, please call [facility] [phone] or [address] [place] [address] [phone]. A copy of this note was provided to the patient for reference.

## 2023-01-03 NOTE — TELEPHONE ENCOUNTER
Michael Moser, [place] [facility] Cardiology Triage 34 minutes ago (8:59 AM)     AH  Please help to get this clx as procedure is 1/6/23 & Dr. Jorge Bishop is out of office. Thanks. ALIZA Johnson MD 4 days ago     JT  CFA Endarterectomy on 01/06/2023. Cardiac clearance requested as well as eliquis hold for 2 days prior? Leonor Felty, MD Clorinda Gutta, MA 5 days ago     MS  Recommend clearance by Dr. Phyllis Nova as he just recently had TAVR. OK from EP standpoint for surgey      Clorinda Gutta, [place] Leonor Felty, MD 5 days ago     JT  CFA Endarterectomy on 01/06/2023. Cardiac clearance requested as well as eliquis hold for 2 days prior.

## 2023-01-03 NOTE — TELEPHONE ENCOUNTER
Patient is low risk to proceed with common femoral endarterectomy. He may hold Eliquis as requested.

## 2023-01-03 NOTE — PROGRESS NOTES
Dr. Ernestina Mares reviewed chart. New order received \"type and screen DOS. \" Order already signed/held. Ok to proceed.

## 2023-01-04 NOTE — PROGRESS NOTES
Angela Sanchez MD  to Yvette Merrill RN    MG    4:11 PM  Note   Patient is low risk to proceed with common femoral endarterectomy. He may hold Eliquis as requested.

## 2023-01-05 ENCOUNTER — ANESTHESIA EVENT (OUTPATIENT)
Dept: SURGERY | Age: 73
End: 2023-01-05
Payer: MEDICARE

## 2023-01-06 ENCOUNTER — ANESTHESIA (OUTPATIENT)
Dept: SURGERY | Age: 73
End: 2023-01-06
Payer: MEDICARE

## 2023-01-06 ENCOUNTER — HOSPITAL ENCOUNTER (INPATIENT)
Age: 73
LOS: 1 days | Discharge: HOME | DRG: 253 | End: 2023-01-07
Attending: STUDENT IN AN ORGANIZED HEALTH CARE EDUCATION/TRAINING PROGRAM | Admitting: STUDENT IN AN ORGANIZED HEALTH CARE EDUCATION/TRAINING PROGRAM
Payer: MEDICARE

## 2023-01-06 DIAGNOSIS — I70.202 OCCLUSION OF LEFT FEMORAL ARTERY (HCC): Primary | ICD-10-CM

## 2023-01-06 PROBLEM — I73.9 PAD (PERIPHERAL ARTERY DISEASE) (HCC): Status: ACTIVE | Noted: 2023-01-06

## 2023-01-06 LAB
ABO + RH BLD: NORMAL
BLOOD GROUP ANTIBODIES SERPL: NORMAL
GLUCOSE BLD STRIP.AUTO-MCNC: 140 MG/DL (ref 65–100)
GLUCOSE BLD STRIP.AUTO-MCNC: 164 MG/DL (ref 65–100)
SERVICE CMNT-IMP: ABNORMAL
SERVICE CMNT-IMP: ABNORMAL
SPECIMEN EXP DATE BLD: NORMAL

## 2023-01-06 PROCEDURE — 86850 RBC ANTIBODY SCREEN: CPT

## 2023-01-06 PROCEDURE — 03HY32Z INSERTION OF MONITORING DEVICE INTO UPPER ARTERY, PERCUTANEOUS APPROACH: ICD-10-PCS | Performed by: NURSE ANESTHETIST, CERTIFIED REGISTERED

## 2023-01-06 PROCEDURE — 35371 RECHANNELING OF ARTERY: CPT | Performed by: STUDENT IN AN ORGANIZED HEALTH CARE EDUCATION/TRAINING PROGRAM

## 2023-01-06 PROCEDURE — 3700000000 HC ANESTHESIA ATTENDED CARE: Performed by: STUDENT IN AN ORGANIZED HEALTH CARE EDUCATION/TRAINING PROGRAM

## 2023-01-06 PROCEDURE — 82962 GLUCOSE BLOOD TEST: CPT

## 2023-01-06 PROCEDURE — 2500000003 HC RX 250 WO HCPCS: Performed by: NURSE ANESTHETIST, CERTIFIED REGISTERED

## 2023-01-06 PROCEDURE — 04UL07Z SUPPLEMENT LEFT FEMORAL ARTERY WITH AUTOLOGOUS TISSUE SUBSTITUTE, OPEN APPROACH: ICD-10-PCS | Performed by: STUDENT IN AN ORGANIZED HEALTH CARE EDUCATION/TRAINING PROGRAM

## 2023-01-06 PROCEDURE — 2100000000 HC CCU R&B

## 2023-01-06 PROCEDURE — 06BQ0ZZ EXCISION OF LEFT SAPHENOUS VEIN, OPEN APPROACH: ICD-10-PCS | Performed by: STUDENT IN AN ORGANIZED HEALTH CARE EDUCATION/TRAINING PROGRAM

## 2023-01-06 PROCEDURE — 7100000001 HC PACU RECOVERY - ADDTL 15 MIN: Performed by: STUDENT IN AN ORGANIZED HEALTH CARE EDUCATION/TRAINING PROGRAM

## 2023-01-06 PROCEDURE — 6360000002 HC RX W HCPCS: Performed by: STUDENT IN AN ORGANIZED HEALTH CARE EDUCATION/TRAINING PROGRAM

## 2023-01-06 PROCEDURE — 2500000003 HC RX 250 WO HCPCS: Performed by: STUDENT IN AN ORGANIZED HEALTH CARE EDUCATION/TRAINING PROGRAM

## 2023-01-06 PROCEDURE — 2580000003 HC RX 258: Performed by: ANESTHESIOLOGY

## 2023-01-06 PROCEDURE — 3700000001 HC ADD 15 MINUTES (ANESTHESIA): Performed by: STUDENT IN AN ORGANIZED HEALTH CARE EDUCATION/TRAINING PROGRAM

## 2023-01-06 PROCEDURE — 04CL0ZZ EXTIRPATION OF MATTER FROM LEFT FEMORAL ARTERY, OPEN APPROACH: ICD-10-PCS | Performed by: STUDENT IN AN ORGANIZED HEALTH CARE EDUCATION/TRAINING PROGRAM

## 2023-01-06 PROCEDURE — 6360000002 HC RX W HCPCS: Performed by: NURSE ANESTHETIST, CERTIFIED REGISTERED

## 2023-01-06 PROCEDURE — 2709999900 HC NON-CHARGEABLE SUPPLY: Performed by: STUDENT IN AN ORGANIZED HEALTH CARE EDUCATION/TRAINING PROGRAM

## 2023-01-06 PROCEDURE — 2580000003 HC RX 258: Performed by: STUDENT IN AN ORGANIZED HEALTH CARE EDUCATION/TRAINING PROGRAM

## 2023-01-06 PROCEDURE — 7100000000 HC PACU RECOVERY - FIRST 15 MIN: Performed by: STUDENT IN AN ORGANIZED HEALTH CARE EDUCATION/TRAINING PROGRAM

## 2023-01-06 PROCEDURE — 2580000003 HC RX 258: Performed by: NURSE ANESTHETIST, CERTIFIED REGISTERED

## 2023-01-06 PROCEDURE — 3600000007 HC SURGERY HYBRID BASE: Performed by: STUDENT IN AN ORGANIZED HEALTH CARE EDUCATION/TRAINING PROGRAM

## 2023-01-06 PROCEDURE — 6370000000 HC RX 637 (ALT 250 FOR IP): Performed by: STUDENT IN AN ORGANIZED HEALTH CARE EDUCATION/TRAINING PROGRAM

## 2023-01-06 PROCEDURE — 6370000000 HC RX 637 (ALT 250 FOR IP): Performed by: ANESTHESIOLOGY

## 2023-01-06 PROCEDURE — 3600000017 HC SURGERY HYBRID ADDL 15MIN: Performed by: STUDENT IN AN ORGANIZED HEALTH CARE EDUCATION/TRAINING PROGRAM

## 2023-01-06 RX ORDER — MIDAZOLAM HYDROCHLORIDE 2 MG/2ML
2 INJECTION, SOLUTION INTRAMUSCULAR; INTRAVENOUS
Status: DISCONTINUED | OUTPATIENT
Start: 2023-01-06 | End: 2023-01-06 | Stop reason: HOSPADM

## 2023-01-06 RX ORDER — ONDANSETRON 2 MG/ML
4 INJECTION INTRAMUSCULAR; INTRAVENOUS
Status: DISCONTINUED | OUTPATIENT
Start: 2023-01-06 | End: 2023-01-06 | Stop reason: HOSPADM

## 2023-01-06 RX ORDER — PROTAMINE SULFATE 10 MG/ML
INJECTION, SOLUTION INTRAVENOUS PRN
Status: DISCONTINUED | OUTPATIENT
Start: 2023-01-06 | End: 2023-01-06 | Stop reason: SDUPTHER

## 2023-01-06 RX ORDER — VECURONIUM BROMIDE 1 MG/ML
INJECTION, POWDER, LYOPHILIZED, FOR SOLUTION INTRAVENOUS PRN
Status: DISCONTINUED | OUTPATIENT
Start: 2023-01-06 | End: 2023-01-06 | Stop reason: SDUPTHER

## 2023-01-06 RX ORDER — SODIUM CHLORIDE 9 MG/ML
INJECTION, SOLUTION INTRAVENOUS PRN
Status: DISCONTINUED | OUTPATIENT
Start: 2023-01-06 | End: 2023-01-06 | Stop reason: HOSPADM

## 2023-01-06 RX ORDER — INSULIN LISPRO 100 [IU]/ML
0-4 INJECTION, SOLUTION INTRAVENOUS; SUBCUTANEOUS
Status: DISCONTINUED | OUTPATIENT
Start: 2023-01-06 | End: 2023-01-07 | Stop reason: HOSPADM

## 2023-01-06 RX ORDER — SODIUM CHLORIDE 0.9 % (FLUSH) 0.9 %
5-40 SYRINGE (ML) INJECTION EVERY 12 HOURS SCHEDULED
Status: DISCONTINUED | OUTPATIENT
Start: 2023-01-06 | End: 2023-01-06 | Stop reason: HOSPADM

## 2023-01-06 RX ORDER — OXYCODONE HYDROCHLORIDE 5 MG/1
10 TABLET ORAL PRN
Status: DISCONTINUED | OUTPATIENT
Start: 2023-01-06 | End: 2023-01-06 | Stop reason: HOSPADM

## 2023-01-06 RX ORDER — ROCURONIUM BROMIDE 10 MG/ML
INJECTION, SOLUTION INTRAVENOUS PRN
Status: DISCONTINUED | OUTPATIENT
Start: 2023-01-06 | End: 2023-01-06 | Stop reason: SDUPTHER

## 2023-01-06 RX ORDER — SODIUM CHLORIDE 0.9 % (FLUSH) 0.9 %
5-40 SYRINGE (ML) INJECTION PRN
Status: DISCONTINUED | OUTPATIENT
Start: 2023-01-06 | End: 2023-01-07 | Stop reason: HOSPADM

## 2023-01-06 RX ORDER — EPHEDRINE SULFATE/0.9% NACL/PF 50 MG/5 ML
SYRINGE (ML) INTRAVENOUS PRN
Status: DISCONTINUED | OUTPATIENT
Start: 2023-01-06 | End: 2023-01-06 | Stop reason: SDUPTHER

## 2023-01-06 RX ORDER — SODIUM CHLORIDE 0.9 % (FLUSH) 0.9 %
5-40 SYRINGE (ML) INJECTION EVERY 12 HOURS SCHEDULED
Status: DISCONTINUED | OUTPATIENT
Start: 2023-01-06 | End: 2023-01-07 | Stop reason: HOSPADM

## 2023-01-06 RX ORDER — DEXAMETHASONE SODIUM PHOSPHATE 10 MG/ML
INJECTION INTRAMUSCULAR; INTRAVENOUS PRN
Status: DISCONTINUED | OUTPATIENT
Start: 2023-01-06 | End: 2023-01-06 | Stop reason: SDUPTHER

## 2023-01-06 RX ORDER — INSULIN LISPRO 100 [IU]/ML
0-4 INJECTION, SOLUTION INTRAVENOUS; SUBCUTANEOUS NIGHTLY
Status: DISCONTINUED | OUTPATIENT
Start: 2023-01-06 | End: 2023-01-07 | Stop reason: HOSPADM

## 2023-01-06 RX ORDER — DEXTROSE MONOHYDRATE 100 MG/ML
INJECTION, SOLUTION INTRAVENOUS CONTINUOUS PRN
Status: DISCONTINUED | OUTPATIENT
Start: 2023-01-06 | End: 2023-01-07 | Stop reason: HOSPADM

## 2023-01-06 RX ORDER — HEPARIN SODIUM 1000 [USP'U]/ML
INJECTION, SOLUTION INTRAVENOUS; SUBCUTANEOUS PRN
Status: DISCONTINUED | OUTPATIENT
Start: 2023-01-06 | End: 2023-01-06 | Stop reason: SDUPTHER

## 2023-01-06 RX ORDER — SODIUM CHLORIDE, SODIUM LACTATE, POTASSIUM CHLORIDE, CALCIUM CHLORIDE 600; 310; 30; 20 MG/100ML; MG/100ML; MG/100ML; MG/100ML
INJECTION, SOLUTION INTRAVENOUS CONTINUOUS
Status: DISCONTINUED | OUTPATIENT
Start: 2023-01-06 | End: 2023-01-06 | Stop reason: HOSPADM

## 2023-01-06 RX ORDER — OXYCODONE HYDROCHLORIDE 5 MG/1
5 TABLET ORAL PRN
Status: DISCONTINUED | OUTPATIENT
Start: 2023-01-06 | End: 2023-01-06 | Stop reason: HOSPADM

## 2023-01-06 RX ORDER — SODIUM CHLORIDE, SODIUM LACTATE, POTASSIUM CHLORIDE, CALCIUM CHLORIDE 600; 310; 30; 20 MG/100ML; MG/100ML; MG/100ML; MG/100ML
INJECTION, SOLUTION INTRAVENOUS CONTINUOUS
Status: DISCONTINUED | OUTPATIENT
Start: 2023-01-06 | End: 2023-01-06 | Stop reason: SDUPTHER

## 2023-01-06 RX ORDER — LIDOCAINE HYDROCHLORIDE 20 MG/ML
INJECTION, SOLUTION EPIDURAL; INFILTRATION; INTRACAUDAL; PERINEURAL PRN
Status: DISCONTINUED | OUTPATIENT
Start: 2023-01-06 | End: 2023-01-06 | Stop reason: SDUPTHER

## 2023-01-06 RX ORDER — PROCHLORPERAZINE EDISYLATE 5 MG/ML
5 INJECTION INTRAMUSCULAR; INTRAVENOUS
Status: DISCONTINUED | OUTPATIENT
Start: 2023-01-06 | End: 2023-01-06 | Stop reason: HOSPADM

## 2023-01-06 RX ORDER — HEPARIN SODIUM 5000 [USP'U]/ML
INJECTION, SOLUTION INTRAVENOUS; SUBCUTANEOUS PRN
Status: DISCONTINUED | OUTPATIENT
Start: 2023-01-06 | End: 2023-01-06 | Stop reason: HOSPADM

## 2023-01-06 RX ORDER — DIPHENHYDRAMINE HYDROCHLORIDE 50 MG/ML
12.5 INJECTION INTRAMUSCULAR; INTRAVENOUS
Status: DISCONTINUED | OUTPATIENT
Start: 2023-01-06 | End: 2023-01-06 | Stop reason: HOSPADM

## 2023-01-06 RX ORDER — ATORVASTATIN CALCIUM 80 MG/1
80 TABLET, FILM COATED ORAL NIGHTLY
Status: DISCONTINUED | OUTPATIENT
Start: 2023-01-06 | End: 2023-01-07 | Stop reason: HOSPADM

## 2023-01-06 RX ORDER — LIDOCAINE HYDROCHLORIDE 10 MG/ML
1 INJECTION, SOLUTION INFILTRATION; PERINEURAL
Status: DISCONTINUED | OUTPATIENT
Start: 2023-01-06 | End: 2023-01-06 | Stop reason: HOSPADM

## 2023-01-06 RX ORDER — ACETAMINOPHEN 500 MG
1000 TABLET ORAL ONCE
Status: COMPLETED | OUTPATIENT
Start: 2023-01-06 | End: 2023-01-06

## 2023-01-06 RX ORDER — PROPOFOL 10 MG/ML
INJECTION, EMULSION INTRAVENOUS PRN
Status: DISCONTINUED | OUTPATIENT
Start: 2023-01-06 | End: 2023-01-06 | Stop reason: SDUPTHER

## 2023-01-06 RX ORDER — HYDROMORPHONE HCL 110MG/55ML
0.25 PATIENT CONTROLLED ANALGESIA SYRINGE INTRAVENOUS EVERY 5 MIN PRN
Status: DISCONTINUED | OUTPATIENT
Start: 2023-01-06 | End: 2023-01-06 | Stop reason: HOSPADM

## 2023-01-06 RX ORDER — ONDANSETRON 2 MG/ML
4 INJECTION INTRAMUSCULAR; INTRAVENOUS EVERY 6 HOURS PRN
Status: DISCONTINUED | OUTPATIENT
Start: 2023-01-06 | End: 2023-01-07 | Stop reason: HOSPADM

## 2023-01-06 RX ORDER — ENOXAPARIN SODIUM 100 MG/ML
40 INJECTION SUBCUTANEOUS DAILY
Status: DISCONTINUED | OUTPATIENT
Start: 2023-01-06 | End: 2023-01-06 | Stop reason: SDUPTHER

## 2023-01-06 RX ORDER — CARVEDILOL 3.12 MG/1
12.5 TABLET ORAL 2 TIMES DAILY WITH MEALS
Status: DISCONTINUED | OUTPATIENT
Start: 2023-01-06 | End: 2023-01-07 | Stop reason: HOSPADM

## 2023-01-06 RX ORDER — LABETALOL HYDROCHLORIDE 5 MG/ML
10 INJECTION, SOLUTION INTRAVENOUS
Status: DISCONTINUED | OUTPATIENT
Start: 2023-01-06 | End: 2023-01-07 | Stop reason: HOSPADM

## 2023-01-06 RX ORDER — ONDANSETRON 4 MG/1
4 TABLET, ORALLY DISINTEGRATING ORAL EVERY 8 HOURS PRN
Status: DISCONTINUED | OUTPATIENT
Start: 2023-01-06 | End: 2023-01-07 | Stop reason: HOSPADM

## 2023-01-06 RX ORDER — FENTANYL CITRATE 50 UG/ML
INJECTION, SOLUTION INTRAMUSCULAR; INTRAVENOUS PRN
Status: DISCONTINUED | OUTPATIENT
Start: 2023-01-06 | End: 2023-01-06 | Stop reason: SDUPTHER

## 2023-01-06 RX ORDER — BUPIVACAINE HYDROCHLORIDE 2.5 MG/ML
INJECTION, SOLUTION EPIDURAL; INFILTRATION; INTRACAUDAL PRN
Status: DISCONTINUED | OUTPATIENT
Start: 2023-01-06 | End: 2023-01-06 | Stop reason: HOSPADM

## 2023-01-06 RX ORDER — SODIUM CHLORIDE 9 MG/ML
INJECTION, SOLUTION INTRAVENOUS PRN
Status: DISCONTINUED | OUTPATIENT
Start: 2023-01-06 | End: 2023-01-07 | Stop reason: HOSPADM

## 2023-01-06 RX ORDER — HYDROMORPHONE HCL 110MG/55ML
0.5 PATIENT CONTROLLED ANALGESIA SYRINGE INTRAVENOUS EVERY 5 MIN PRN
Status: DISCONTINUED | OUTPATIENT
Start: 2023-01-06 | End: 2023-01-06 | Stop reason: HOSPADM

## 2023-01-06 RX ORDER — ASPIRIN 81 MG/1
81 TABLET ORAL DAILY
Status: DISCONTINUED | OUTPATIENT
Start: 2023-01-06 | End: 2023-01-07 | Stop reason: HOSPADM

## 2023-01-06 RX ORDER — ENALAPRILAT 2.5 MG/2ML
INJECTION INTRAVENOUS PRN
Status: DISCONTINUED | OUTPATIENT
Start: 2023-01-06 | End: 2023-01-06 | Stop reason: SDUPTHER

## 2023-01-06 RX ORDER — SODIUM CHLORIDE 0.9 % (FLUSH) 0.9 %
5-40 SYRINGE (ML) INJECTION PRN
Status: DISCONTINUED | OUTPATIENT
Start: 2023-01-06 | End: 2023-01-06 | Stop reason: HOSPADM

## 2023-01-06 RX ORDER — SUCCINYLCHOLINE CHLORIDE 20 MG/ML
INJECTION INTRAMUSCULAR; INTRAVENOUS PRN
Status: DISCONTINUED | OUTPATIENT
Start: 2023-01-06 | End: 2023-01-06 | Stop reason: SDUPTHER

## 2023-01-06 RX ADMIN — ENALAPRILAT 1 MG: 2.5 INJECTION INTRAVENOUS at 08:18

## 2023-01-06 RX ADMIN — ENALAPRILAT 1 MG: 2.5 INJECTION INTRAVENOUS at 10:08

## 2023-01-06 RX ADMIN — DEXAMETHASONE SODIUM PHOSPHATE 10 MG: 10 INJECTION INTRAMUSCULAR; INTRAVENOUS at 07:50

## 2023-01-06 RX ADMIN — SODIUM CHLORIDE, SODIUM LACTATE, POTASSIUM CHLORIDE, AND CALCIUM CHLORIDE: 600; 310; 30; 20 INJECTION, SOLUTION INTRAVENOUS at 06:30

## 2023-01-06 RX ADMIN — VECURONIUM BROMIDE 4 MG: 1 INJECTION, POWDER, LYOPHILIZED, FOR SOLUTION INTRAVENOUS at 09:15

## 2023-01-06 RX ADMIN — SUGAMMADEX 200 MG: 100 INJECTION, SOLUTION INTRAVENOUS at 10:17

## 2023-01-06 RX ADMIN — Medication 200 MG: at 07:12

## 2023-01-06 RX ADMIN — SACUBITRIL AND VALSARTAN 1 TABLET: 24; 26 TABLET, FILM COATED ORAL at 21:56

## 2023-01-06 RX ADMIN — NOREPINEPHRINE BITARTRATE 16 MCG: 1 SOLUTION INTRAVENOUS at 07:49

## 2023-01-06 RX ADMIN — NOREPINEPHRINE BITARTRATE 16 MCG: 1 SOLUTION INTRAVENOUS at 08:05

## 2023-01-06 RX ADMIN — PROTAMINE SULFATE 10 MG: 10 INJECTION, SOLUTION INTRAVENOUS at 09:46

## 2023-01-06 RX ADMIN — PROPOFOL 200 MG: 10 INJECTION, EMULSION INTRAVENOUS at 07:12

## 2023-01-06 RX ADMIN — LABETALOL HYDROCHLORIDE 10 MG: 5 INJECTION INTRAVENOUS at 15:55

## 2023-01-06 RX ADMIN — ACETAMINOPHEN 1000 MG: 500 TABLET, FILM COATED ORAL at 06:30

## 2023-01-06 RX ADMIN — ENALAPRILAT 1 MG: 2.5 INJECTION INTRAVENOUS at 07:51

## 2023-01-06 RX ADMIN — Medication 10 MG: at 07:20

## 2023-01-06 RX ADMIN — HEPARIN SODIUM 13000 UNITS: 1000 INJECTION, SOLUTION INTRAVENOUS; SUBCUTANEOUS at 08:37

## 2023-01-06 RX ADMIN — PROPOFOL 50 MG: 10 INJECTION, EMULSION INTRAVENOUS at 09:15

## 2023-01-06 RX ADMIN — Medication 2000 MG: at 07:32

## 2023-01-06 RX ADMIN — ENALAPRILAT 1 MG: 2.5 INJECTION INTRAVENOUS at 07:50

## 2023-01-06 RX ADMIN — ATORVASTATIN CALCIUM 80 MG: 80 TABLET, FILM COATED ORAL at 21:56

## 2023-01-06 RX ADMIN — PHENYLEPHRINE HYDROCHLORIDE 100 MCG: 10 INJECTION INTRAVENOUS at 07:35

## 2023-01-06 RX ADMIN — ROCURONIUM BROMIDE 20 MG: 50 INJECTION, SOLUTION INTRAVENOUS at 07:46

## 2023-01-06 RX ADMIN — SACUBITRIL AND VALSARTAN 1 TABLET: 24; 26 TABLET, FILM COATED ORAL at 14:46

## 2023-01-06 RX ADMIN — PHENYLEPHRINE HYDROCHLORIDE 100 MCG: 10 INJECTION INTRAVENOUS at 07:40

## 2023-01-06 RX ADMIN — ROCURONIUM BROMIDE 50 MG: 50 INJECTION, SOLUTION INTRAVENOUS at 07:38

## 2023-01-06 RX ADMIN — SODIUM CHLORIDE, SODIUM LACTATE, POTASSIUM CHLORIDE, AND CALCIUM CHLORIDE: 600; 310; 30; 20 INJECTION, SOLUTION INTRAVENOUS at 07:39

## 2023-01-06 RX ADMIN — CARVEDILOL 12.5 MG: 3.12 TABLET, FILM COATED ORAL at 17:55

## 2023-01-06 RX ADMIN — PROTAMINE SULFATE 40 MG: 10 INJECTION, SOLUTION INTRAVENOUS at 09:40

## 2023-01-06 RX ADMIN — LIDOCAINE HYDROCHLORIDE 100 MG: 20 INJECTION, SOLUTION EPIDURAL; INFILTRATION; INTRACAUDAL; PERINEURAL at 07:12

## 2023-01-06 RX ADMIN — ENALAPRILAT 1 MG: 2.5 INJECTION INTRAVENOUS at 09:48

## 2023-01-06 RX ADMIN — PHENYLEPHRINE HYDROCHLORIDE 50 MCG: 10 INJECTION INTRAVENOUS at 07:18

## 2023-01-06 RX ADMIN — PHENYLEPHRINE HYDROCHLORIDE 100 MCG: 10 INJECTION INTRAVENOUS at 07:19

## 2023-01-06 RX ADMIN — NOREPINEPHRINE BITARTRATE 32 MCG: 1 SOLUTION INTRAVENOUS at 07:47

## 2023-01-06 RX ADMIN — SODIUM CHLORIDE, PRESERVATIVE FREE 10 ML: 5 INJECTION INTRAVENOUS at 21:49

## 2023-01-06 RX ADMIN — PHENYLEPHRINE HYDROCHLORIDE 100 MCG: 10 INJECTION INTRAVENOUS at 07:34

## 2023-01-06 RX ADMIN — NOREPINEPHRINE BITARTRATE 8 MCG: 1 SOLUTION INTRAVENOUS at 07:46

## 2023-01-06 RX ADMIN — FENTANYL CITRATE 100 MCG: 50 INJECTION, SOLUTION INTRAMUSCULAR; INTRAVENOUS at 06:55

## 2023-01-06 RX ADMIN — ENALAPRILAT 1 MG: 2.5 INJECTION INTRAVENOUS at 08:01

## 2023-01-06 RX ADMIN — PHENYLEPHRINE HYDROCHLORIDE 200 MCG: 10 INJECTION INTRAVENOUS at 07:45

## 2023-01-06 RX ADMIN — ENALAPRILAT 1 MG: 2.5 INJECTION INTRAVENOUS at 09:20

## 2023-01-06 RX ADMIN — Medication 10 MG: at 07:44

## 2023-01-06 ASSESSMENT — PAIN SCALES - GENERAL
PAINLEVEL_OUTOF10: 0
PAINLEVEL_OUTOF10: 0

## 2023-01-06 ASSESSMENT — PAIN - FUNCTIONAL ASSESSMENT: PAIN_FUNCTIONAL_ASSESSMENT: 0-10

## 2023-01-06 NOTE — PERIOP NOTE
TRANSFER - OUT REPORT:    Verbal report given to Gio Lawson RN on [facility]  being transferred to [facility] for routine post-op       Report consisted of patients Situation, Background, Assessment and   Recommendations(SBAR). Information from the following report(s) Adult Overview, Surgery Report, Intake/Output, and MAR was reviewed with the receiving nurse. Lines:   Peripheral IV 01/06/23 Right; Anterior Hand (Active)   Site Assessment Clean, dry & intact 01/06/23 1132   Line Status Infusing 01/06/23 1132   Phlebitis Assessment No symptoms 01/06/23 1132   Infiltration Assessment 0 01/06/23 1132   Alcohol Cap Used No 01/06/23 0627   Dressing Status Clean, dry & intact 01/06/23 1132   Dressing Type Transparent 01/06/23 1132        Opportunity for questions and clarification was provided. Patient transported with:   O2 @ 3 liters    VTE prophylaxis orders have not been written for [facility]. Patient and family given floor number and nurses name.

## 2023-01-06 NOTE — H&P
[address] FAX: [phone]     Bin Rizvi  : 1950        Reason for visit: Acute left leg claudication post left PCI     Chief Complaint: 81 y.o. male with left leg claudication that began POD 1 from TAVR with left femoral access and closure with Proglide perclose device. CTA  and Art DUS show short segment occlusion of the left CFA with reconstitution of the distal CFA via the profunda. Patient reports that claudication is lifestyle limiting. Plan:  Plan for left femoral endarterectomy. Level 5 , Acute or chronic illness or injury that poses threat to life or bodily function. , and Elective major surgery with risk factors below     Imaging interpreted: CTA aorta with runoff, LE Art DUS     Smoker:  Tobacco Use      Smoking status: Former        Types: Cigars        Quit date: 2021        Years since quittin.5      Smokeless tobacco: Never      Tobacco comments: Never smoked cigarettes. Complexity of illness:  HTN, HLD, CHF, Afib, CAD, and Obesity     Procedures by BSVS:CEA Claudia Angel       Referred by: MD Mari Parker MD      Statin: Yes          DAPT/AC: ASA and Eliquis     Dye allergy: no     Metal implants or AICD: no       Ambulatory status: With claudication at 50ft, and unable to walk past 50ft     Constitutional:   Negative for fevers and unexplained weight loss. Eyes:   Negative for visual disturbance. ENT:   Negative for significant hearing loss and tinnitus. Respiratory:   Negative for hemoptysis. Cardiovascular:   Negative except as noted in HPI. Gastrointestinal:   Negative for melena and abdominal pain. Genitourinary:   Negative for hematuria, renal stones. Integumentary:   Negative for rash or non-healing wounds  Hematologic/Lymphatic:   Negative for excessive bleeding hx or clotting disorder.   Musculoskeletal:  Negative for active, unexplained/severe joint pain. Neurological:   Negative for stroke. Behavioral/Psych:   Negative for suicidal ideations. Endocrine:   Negative for uncontrolled diabetic symptoms including polyuria, polydipsia and poor wound healing. Physical Examination:   Height: 5' 9\" (1.753 m), Weight: 249 lb (112.9 kg), BP: (!) 174/90     General:Well-developed. No distress. HENT: AT  CV: Regular rhythm. LUNG: Effort normal and breath sounds normal. No respiratory distress. Abdominal: Soft. Bowel sounds are normal. he exhibits no distension. There is no tenderness. There is no guarding. No hernia.    Extremities:left groin crease with erythema and skin breakdown, without wounds, motor and sensation intact  Vascular: Radial:, L, 2+ , R, 2+  , Femoral:, L absent , R, 2+  , DP:, L, Dop , R, 2+       Heidi Cortez MD

## 2023-01-06 NOTE — ANESTHESIA POSTPROCEDURE EVALUATION
Department of Anesthesiology  Postprocedure Note    Patient: Elvia Newberry  MRN: [id number]  YOB: 1950  Date of evaluation: 1/6/2023      Procedure Summary     Date: 01/06/23 Room / Location: [facility] MAIN OR [address] [place] [address] MAIN OR    Anesthesia Start: 5348 Anesthesia Stop: 1039    Procedure: LEFT FEMORAL ENDARTERECTOMY WITH CONTRALATERAL VEIN PATCH (Left: Groin) Diagnosis:       Carotid artery stenosis      (Carotid artery stenosis [I65.29])    Providers: Esther Patel MD Responsible Provider: Dory Osorio MD    Anesthesia Type: general ASA Status: 3          Anesthesia Type: No value filed.     Davian Phase I: Davian Score: 8    Davian Phase II: Davian Score: 9      Anesthesia Post Evaluation    Patient location during evaluation: PACU  Patient participation: complete - patient participated  Level of consciousness: awake and alert  Airway patency: patent  Nausea & Vomiting: no nausea  Complications: no  Cardiovascular status: blood pressure returned to baseline  Respiratory status: acceptable  Hydration status: euvolemic  Multimodal analgesia pain management approach

## 2023-01-06 NOTE — CARE COORDINATION
Chart screened by  for discharge planning. No needs identified at this time. Please consult  if any new issues arise.        01/06/23 6031   Condition of Participation: Discharge Planning   The Plan for Transition of Care is related to the following treatment goals: pending clinical needs

## 2023-01-06 NOTE — ANESTHESIA PROCEDURE NOTES
Arterial Line:    An arterial line was placed using ultrasound guidance, in the OR for the following indication(s): continuous blood pressure monitoring and blood sampling needed. A 20 gauge (size) (length), Arrow (type) catheter was placed, Seldinger technique used, into the left radial artery, secured by Tegaderm and tape. Anesthesia type: Local  Local infiltration: Injection    Events:  patient tolerated procedure well with no complications and EBL < 5mL. Additional notes:  Left arm prepped with ChloraPrep, 0.8ml of 1% lidocaine infiltrated at skin, ultrasound guided Seldinger technique, good blood return, good waveform. Potential access sites were examined with ultrasound and acceptable patent access site selected as noted above. Needle path and artery access visualized in real time using ultrasound, an image of wire in vessel recorded for permanent record.     1/6/2023 7:04 AM1/6/2023 7:06 AM  Resident/CRNA: LANDON Simmons - CRNA  Performed: Resident/CRNA   Preanesthetic Checklist  Completed: patient identified, IV checked, site marked, risks and benefits discussed, surgical/procedural consents, equipment checked, pre-op evaluation, timeout performed, anesthesia consent given, oxygen available, monitors applied/VS acknowledged, fire risk safety assessment completed and verbalized and blood product R/B/A discussed and consented

## 2023-01-06 NOTE — ANESTHESIA PRE PROCEDURE
Department of Anesthesiology  Preprocedure Note       Name:  Rajat Juan   Age:  81 y.o.  :  1950                                          MRN:  [id number]         Date:  2023      Surgeon: Tiffany Garces):  Beto Santo MD    Procedure: Procedure(s):  LEFT FEMORAL ENDARTERECTOMY    Medications prior to admission:   Prior to Admission medications    Medication Sig Start Date End Date Taking? Authorizing Provider   empagliflozin (JARDIANCE) 10 MG tablet Take 10 mg by mouth daily    Historical Provider, MD   sacubitril-valsartan (ENTRESTO) 24-26 MG per tablet Take 1 tablet by mouth 2 times daily 22   Dru Lopez MD   nitroGLYCERIN (NITROSTAT) 0.4 MG SL tablet Place 1 tablet under the tongue every 5 minutes as needed for Chest pain up to max of 3 total doses. If no relief after 1 dose, call 911. 22   Zoe Elliott MD   apixaban (ELIQUIS) 5 MG TABS tablet Take 5 mg by mouth 2 times daily 3/9/20   Ar Automatic Reconciliation   aspirin 81 MG chewable tablet Take 81 mg by mouth daily 12   Ar Automatic Reconciliation   atorvastatin (LIPITOR) 80 MG tablet Take 80 mg by mouth at bedtime TAKE 1 TABLET BY MOUTH EVERY DAY 19   Ar Automatic Reconciliation   carvedilol (COREG) 12.5 MG tablet Take 12.5 mg by mouth 2 times daily (with meals) 3/9/20   Ar Automatic Reconciliation   metFORMIN (GLUCOPHAGE) 1000 MG tablet Take 1,000 mg by mouth 2 times daily (with meals) 19   Ar Automatic Reconciliation       Current medications:    No current facility-administered medications for this visit. No current outpatient medications on file.      Facility-Administered Medications Ordered in Other Visits   Medication Dose Route Frequency Provider Last Rate Last Admin    lidocaine 1 % injection 1 mL  1 mL IntraDERmal Once PRN Lavon Vyas IV, MD        lactated ringers infusion   IntraVENous Continuous Lavon Vyas IV,  mL/hr at 23 5182 NoRateChange at 23 9886  sodium chloride flush 0.9 % injection 5-40 mL  5-40 mL IntraVENous 2 times per day Danilo Clark IV, MD        sodium chloride flush 0.9 % injection 5-40 mL  5-40 mL IntraVENous PRN Danilo Clark IV, MD        0.9 % sodium chloride infusion   IntraVENous PRN Danilo Clark IV, MD        midazolam PF (VERSED) injection 2 mg  2 mg IntraVENous Once PRN Danilo Clark IV, MD        ceFAZolin (ANCEF) 2000 mg in sterile water 20 mL IV syringe  2,000 mg IntraVENous Once Dory Garcia MD           Allergies:  No Known Allergies    Problem List:    Patient Active Problem List   Diagnosis Code    Psoriasis L40.9    Other psoriasis L40.8    Dyslipidemia E78.5    Carotid artery stenosis I65.29    Essential hypertension, benign I10    Atrial flutter (HCC) I48.92    CAD (coronary artery disease) I25.10    HFrEF (heart failure with reduced ejection fraction) ([facility]) I50.20    S/P TAVR (transcatheter aortic valve replacement) Z95.2    Left leg pain M79.605    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 [address] 9/15/2013    Benign neoplasm of pineal gland ([facility] [address] 9/15/2013    CAD (coronary artery disease)     stent x 1; Followed by Dr. Cheryl Christina CHF (congestive heart failure) ([facility] [address]     Current use of long term anticoagulation     Eliquis and Aspirin    Essential hypertension, benign 9/15/2013    H/O heart artery stent     X1    History of basal cell carcinoma     multiple areas removed    History of CEA (carotid endarterectomy)     right    HLD (hyperlipidemia)     managed with medication     HTN (hypertension)     managed with medication     Hypercholesterolemia     Hypertension 7/12/2013    Irregular heart beat     cardiac ablation corrected    LBBB (left bundle branch block)     Obesity     BMI: 36.7    Peripheral vascular disease, unspecified ([facility] [address] 9/15/2013    Psoriasis     S/P ablation of atrial flutter     Severe aortic stenosis     s/p TVAR 2022    Type 2 diabetes mellitus ([facility] [address]     oral agent only/ AVG / no S/S of low BS/ Last A1c:    Unspecified sleep apnea     no tx at this time- never tested for WOJCIECH       Past Surgical History:        Procedure Laterality Date    CARDIAC PROCEDURE N/A 2022    LEFT HEART CATH / CORONARY ANGIOGRAPHY performed by Ladonna Garza MD at [facility] CATH LAB   [facility] N/A 2022    Transcatheter aortic valve replacement performed by Mag Shirley MD at [facility] N/A 2022    Coronary angiography performed by Mag Shirley MD at [facility] Right 2013    EP DEVICE PROCEDURE N/A 2022    ABLATION A-FLUTTER performed by Bong Campbell MD at [address]  2012    Xience to mid LAD       Social History:    Social History     Tobacco Use    Smoking status: Former     Types: Cigars     Quit date: 2021     Years since quittin.6    Smokeless tobacco: Never    Tobacco comments:     Never smoked cigarettes. Substance Use Topics    Alcohol use: Yes     Comment: rarely                                Counseling given: Not Answered  Tobacco comments: Never smoked cigarettes. Vital Signs (Current): There were no vitals filed for this visit.                                            BP Readings from Last 3 Encounters:   23 (!) 154/79   22 (!) 158/70   22 (!) 174/90       NPO Status:                                                                                 BMI:   Wt Readings from Last 3 Encounters:   23 248 lb (112.5 kg)   22 251 lb 11.2 oz (114.2 kg)   22 249 lb (112.9 kg)     There is no height or weight on file to calculate BMI.    CBC:   Lab Results   Component Value Date/Time    WBC 6.6 2022 08:59 AM    RBC 5.07 2022 08:59 AM    HGB 14.9 2022 08:59 AM    HCT 46.2 12/30/2022 08:59 AM    MCV 91.1 12/30/2022 08:59 AM    MCV 88.7 03/09/2020 10:58 AM    RDW 14.6 12/30/2022 08:59 AM     12/30/2022 08:59 AM       CMP:   Lab Results   Component Value Date/Time     12/30/2022 08:59 AM    K 4.6 12/30/2022 08:59 AM     12/30/2022 08:59 AM    CO2 27 12/30/2022 08:59 AM    BUN 17 12/30/2022 08:59 AM    CREATININE 0.62 12/30/2022 08:59 AM    GFRAA >60 08/03/2022 11:06 PM    AGRATIO 1.7 03/09/2020 11:24 AM    LABGLOM >60 12/30/2022 08:59 AM    GLUCOSE 170 12/30/2022 08:59 AM    PROT 7.9 11/07/2022 08:39 AM    CALCIUM 9.2 12/30/2022 08:59 AM    BILITOT 0.8 11/07/2022 08:39 AM    ALKPHOS 74 11/07/2022 08:39 AM    ALKPHOS 75 03/09/2020 11:24 AM    AST 19 11/07/2022 08:39 AM    ALT 27 11/07/2022 08:39 AM       POC Tests:   Recent Labs     01/06/23  0608   POCGLU 164*       Coags:   Lab Results   Component Value Date/Time    PROTIME 14.1 11/07/2022 08:39 AM    INR 1.1 11/07/2022 08:39 AM       HCG (If Applicable): No results found for: PREGTESTUR, PREGSERUM, HCG, HCGQUANT     ABGs: No results found for: PHART, PO2ART, PNU4CHV, WJJ9QUI, BEART, I3EHVGGP     Type & Screen (If Applicable):  No results found for: LABABO, LABRH    Drug/Infectious Status (If Applicable):  Lab Results   Component Value Date/Time    HEPCAB <0.1 02/29/2016 09:02 AM       COVID-19 Screening (If Applicable): No results found for: COVID19        Anesthesia Evaluation  Patient summary reviewed and Nursing notes reviewed   history of anesthetic complications (intubated with glidescope prior): history of difficult intubation.   Airway: Mallampati: III  TM distance: >3 FB   Neck ROM: full  Comment: Short neck, looks difficult  Mouth opening: < 3 FB   Dental: normal exam         Pulmonary:normal exam  breath sounds clear to auscultation  (+) sleep apnea: on noncompliant,                             Cardiovascular:  Exercise tolerance: poor (<4 METS), Until recently was able to [employer] and throw umang.  (+) hypertension:, valvular problems/murmurs (severe AS s/p TAVR, now mean gradient 11, mod MR ): MR and AS, CAD (s/p PCI/stent 2012):, dysrhythmias (s/p ablation 2012/2022): atrial flutter, CHF (h/o EF 66%): systolic, hyperlipidemia        Rhythm: regular  Rate: normal                 ROS comment: 12/2022 ECHO with EF 50-55%         Neuro/Psych:               GI/Hepatic/Renal:             Endo/Other:    (+) DiabetesType II DM, , .                 Abdominal:             Vascular:   + PVD, aortic or cerebral, . Other Findings:             Anesthesia Plan      general     ASA 3       Induction: intravenous. arterial line    Anesthetic plan and risks discussed with patient (family). Use of blood products discussed with patient whom.                      Rocio Ibarra MD   1/6/2023

## 2023-01-06 NOTE — ANESTHESIA PROCEDURE NOTES
Airway  Date/Time: 1/6/2023 7:14 AM  Urgency: elective    Difficult airway    General Information and Staff    Patient location during procedure: OR  Resident/CRNA: LANDON Simmons - CRNA  Performed: resident/CRNA     Indications and Patient Condition  Indications for airway management: anesthesia  Spontaneous Ventilation: absent  Sedation level: deep  Preoxygenated: yes  Patient position: sniffing  MILS not maintained throughout  Mask difficulty assessment: vent by bag mask    Final Airway Details  Final airway type: endotracheal airway      Successful airway: ETT  Cuffed: yes   Successful intubation technique: video laryngoscopy  Endotracheal tube insertion site: oral  Blade size: #4  ETT size (mm): 8.0  Cormack-Lehane Classification: grade I - full view of glottis  Placement verified by: chest auscultation and capnometry   Measured from: lips  ETT to lips (cm): 23  Number of attempts at approach: 1  Ventilation between attempts: bag mask  Number of other approaches attempted: 0    Additional Comments  History of glidescope intubation, ETT easily and atraumatically placed.   Anterior glottis, small mouth opening, redundant tissue   no

## 2023-01-06 NOTE — OP NOTE
[address] FAX: [phone]        Pre-Op diagnosis:    Severe life style limiting claudication   Left common femoral occlusion    Post-Op diagnosis:     Severe life style limiting claudication   Left common femoral occlusion     Surgeon: Gwendolyn Rapp MD     Procedure:   Left femoral endarterectomy with vein patch angioplasty     Complications: None     EBL: 50cc     Anesthesia: General TIVA    Description of procedure: After informed consent was obtained the patient was brought to the operating room and placed in supine position. Preoperative antibiotics were given. Appropriate anesthesia was administered and endotracheal intubation was performed. Timeout was performed confirming patient identity and procedure. Patient was then prepped and draped in sterile fashion. Oblique incision was made in the left groin dissection was carried down through the fascia and common femoral artery was identified. Artery was then dissected proximally inguinal ligament was divided to obtain proximal enough control. Focal occlusion was noted in the proximal common femoral artery. Patient was systemically heparinized. Common femoral artery was clipped proximally distally. 11 blade was used to make arteriotomy extended with [place]. Endarterectomy was performed with adequate proximal and distal endpoints. 3 cm anterior branch of the greater saphenous vein was harvested. Vein was then divided longitudinally. Vein patch angioplasty was then performed with running 6-0 Prolene. Prior to placing the final sutures the artery was backbled and forward flushed. Patient had biphasic Doppler signals distal to endarterectomy. Patient also had palpable 2+ left DP pulse.   Neutralizing dose of protamine was given hemostasis was achieved electrocautery and thrombin Gelfoam.  Wound was then closed in layers with 2-0 Vicryl for the femoral sheath, 3-0 Vicryl for subcutaneous tissues and 4-0 Monocryl for the skin. Dermabond was applied. Patient was awoken taken to PACU in stable condition.

## 2023-01-07 VITALS
WEIGHT: 252.21 LBS | OXYGEN SATURATION: 94 % | HEART RATE: 58 BPM | BODY MASS INDEX: 37.36 KG/M2 | SYSTOLIC BLOOD PRESSURE: 143 MMHG | HEIGHT: 69 IN | DIASTOLIC BLOOD PRESSURE: 64 MMHG | RESPIRATION RATE: 20 BRPM | TEMPERATURE: 98.1 F

## 2023-01-07 LAB
ANION GAP SERPL CALC-SCNC: 6 MMOL/L (ref 2–11)
BUN SERPL-MCNC: 18 MG/DL (ref 8–23)
CALCIUM SERPL-MCNC: 8.7 MG/DL (ref 8.3–10.4)
CHLORIDE SERPL-SCNC: 105 MMOL/L (ref 101–110)
CO2 SERPL-SCNC: 27 MMOL/L (ref 21–32)
CREAT SERPL-MCNC: 0.6 MG/DL (ref 0.8–1.5)
ERYTHROCYTE [DISTWIDTH] IN BLOOD BY AUTOMATED COUNT: 14.6 % (ref 11.9–14.6)
GLUCOSE BLD STRIP.AUTO-MCNC: 141 MG/DL (ref 65–100)
GLUCOSE SERPL-MCNC: 158 MG/DL (ref 65–100)
HCT VFR BLD AUTO: 40.5 % (ref 41.1–50.3)
HGB BLD-MCNC: 13.3 G/DL (ref 13.6–17.2)
MCH RBC QN AUTO: 29.6 PG (ref 26.1–32.9)
MCHC RBC AUTO-ENTMCNC: 32.8 G/DL (ref 31.4–35)
MCV RBC AUTO: 90 FL (ref 82–102)
NRBC # BLD: 0 K/UL (ref 0–0.2)
PLATELET # BLD AUTO: 153 K/UL (ref 150–450)
PMV BLD AUTO: 10.1 FL (ref 9.4–12.3)
POTASSIUM SERPL-SCNC: 4 MMOL/L (ref 3.5–5.1)
RBC # BLD AUTO: 4.5 M/UL (ref 4.23–5.6)
SERVICE CMNT-IMP: ABNORMAL
SODIUM SERPL-SCNC: 138 MMOL/L (ref 133–143)
WBC # BLD AUTO: 10.8 K/UL (ref 4.3–11.1)

## 2023-01-07 PROCEDURE — 85027 COMPLETE CBC AUTOMATED: CPT

## 2023-01-07 PROCEDURE — 6370000000 HC RX 637 (ALT 250 FOR IP): Performed by: STUDENT IN AN ORGANIZED HEALTH CARE EDUCATION/TRAINING PROGRAM

## 2023-01-07 PROCEDURE — 80048 BASIC METABOLIC PNL TOTAL CA: CPT

## 2023-01-07 PROCEDURE — 82962 GLUCOSE BLOOD TEST: CPT

## 2023-01-07 PROCEDURE — 37799 UNLISTED PX VASCULAR SURGERY: CPT

## 2023-01-07 RX ORDER — OXYCODONE HYDROCHLORIDE AND ACETAMINOPHEN 5; 325 MG/1; MG/1
1 TABLET ORAL EVERY 6 HOURS PRN
Qty: 12 TABLET | Refills: 0 | Status: SHIPPED | OUTPATIENT
Start: 2023-01-07 | End: 2023-01-10

## 2023-01-07 RX ADMIN — APIXABAN 5 MG: 5 TABLET, FILM COATED ORAL at 08:50

## 2023-01-07 RX ADMIN — CARVEDILOL 12.5 MG: 3.12 TABLET, FILM COATED ORAL at 07:40

## 2023-01-07 RX ADMIN — ASPIRIN 81 MG: 81 TABLET ORAL at 08:50

## 2023-01-07 RX ADMIN — SACUBITRIL AND VALSARTAN 1 TABLET: 24; 26 TABLET, FILM COATED ORAL at 10:28

## 2023-01-07 ASSESSMENT — PAIN SCALES - GENERAL: PAINLEVEL_OUTOF10: 0

## 2023-01-07 NOTE — PROGRESS NOTES
Discharge instructions reviewed and signed at [phone]. Pt aware to use Nystatin powder to groin to keep dry, not on incision. 4x4, abd pad, and tape supplied to pt per MD request. Stable for discharge.  Out via w/c with RN, updated cousin on home POC and v/u

## 2023-01-07 NOTE — CARE COORDINATION
Pt is for discharge home today with family/friend and no needs/supportive care orders recieved for CM at this time.        01/07/23 1300   Service Assessment   History Provided By Medical Record   Discharge Planning   Patient expects to be discharged to: [facility]   Transition of Care Consult (CM Consult) Discharge Planning   Services At/After Discharge None   Mode of Transport at Discharge Other (see comment)  (Family/Friend.)

## 2023-01-07 NOTE — DISCHARGE INSTRUCTIONS
DISCHARGE SUMMARY from Nurse    PATIENT INSTRUCTIONS:    After general anesthesia or intravenous sedation, for 24 hours or while taking prescription Narcotics:  Limit your activities  Do not drive and operate hazardous machinery  Do not make important personal or business decisions  Do  not drink alcoholic beverages  If you have not urinated within 8 hours after discharge, please contact your surgeon on call. Report the following to your surgeon:  Excessive pain, swelling, redness or odor of or around the surgical area  Temperature over 100.5  Nausea and vomiting lasting longer than 4 hours or if unable to take medications  Any signs of decreased circulation or nerve impairment to extremity: change in color, persistent  numbness, tingling, coldness or increase pain  Any questions    What to do at Home:  Recommended activity: activity as tolerated, as you feel up to, avoid lifting and straining for 2-3 days      *  Please give a list of your current medications to your Primary Care Provider. *  Please update this list whenever your medications are discontinued, doses are      changed, or new medications (including over-the-counter products) are added. *  Please carry medication information at all times in case of emergency situations. These are general instructions for a healthy lifestyle:    No smoking/ No tobacco products/ Avoid exposure to second hand smoke  Surgeon General's Warning:  Quitting smoking now greatly reduces serious risk to your health.     Obesity, smoking, and sedentary lifestyle greatly increases your risk for illness    A healthy diet, regular physical exercise & weight monitoring are important for maintaining a healthy lifestyle    You may be retaining fluid if you have a history of heart failure or if you experience any of the following symptoms:  Weight gain of 3 pounds or more overnight or 5 pounds in a week, increased swelling in our hands or feet or shortness of breath while lying flat in bed. Please call your doctor as soon as you notice any of these symptoms; do not wait until your next office visit. The discharge information has been reviewed with the {PATIENT PARENT GUARDIAN:91740}. The {PATIENT PARENT GUARDIAN:37641} verbalized understanding. Discharge medications reviewed with the {Dishcarge meds reviewed SRHB:92123} and appropriate educational materials and side effects teaching were provided.   ___________________________________________________________________________________________________________________________________

## 2023-01-19 ENCOUNTER — OFFICE VISIT (OUTPATIENT)
Dept: VASCULAR SURGERY | Age: 73
End: 2023-01-19

## 2023-01-19 VITALS
SYSTOLIC BLOOD PRESSURE: 175 MMHG | HEIGHT: 69 IN | OXYGEN SATURATION: 95 % | BODY MASS INDEX: 37.33 KG/M2 | WEIGHT: 252 LBS | HEART RATE: 86 BPM | DIASTOLIC BLOOD PRESSURE: 79 MMHG

## 2023-01-19 DIAGNOSIS — Z09 POSTOPERATIVE EXAMINATION: Primary | ICD-10-CM

## 2023-01-19 PROCEDURE — 99024 POSTOP FOLLOW-UP VISIT: CPT | Performed by: STUDENT IN AN ORGANIZED HEALTH CARE EDUCATION/TRAINING PROGRAM

## 2023-01-19 NOTE — PROGRESS NOTES
Postop Progress Note    Subjective    Alan Rizvi presents to the office 2 weeks post-op left common femoral endarterectomy. Pain reports claudication left leg has resolved. Swelling left leg has resolved. Notes tingling in medial thigh. Objective    General: alert, cooperative, and no distress  Incision: healing well, no drainage, no erythema, well approximated, minimal swelling    Assessment    Doing well postoperatively. Plan   1. Continue any current medications  2.  F/u 2 weeks for US LLE        Electronically signed by Colette Whitney MD on 1/19/2023 at 9:05 AM

## 2023-02-01 ENCOUNTER — HOSPITAL ENCOUNTER (OUTPATIENT)
Dept: CARDIAC REHAB | Age: 73
Setting detail: RECURRING SERIES
End: 2023-02-01
Payer: MEDICARE

## 2023-02-02 ENCOUNTER — OFFICE VISIT (OUTPATIENT)
Dept: VASCULAR SURGERY | Age: 73
End: 2023-02-02

## 2023-02-02 VITALS
SYSTOLIC BLOOD PRESSURE: 153 MMHG | HEART RATE: 68 BPM | DIASTOLIC BLOOD PRESSURE: 73 MMHG | WEIGHT: 256 LBS | HEIGHT: 69 IN | OXYGEN SATURATION: 98 % | BODY MASS INDEX: 37.92 KG/M2

## 2023-02-02 DIAGNOSIS — Z09 POSTOPERATIVE EXAMINATION: Primary | ICD-10-CM

## 2023-02-02 PROCEDURE — 99024 POSTOP FOLLOW-UP VISIT: CPT | Performed by: STUDENT IN AN ORGANIZED HEALTH CARE EDUCATION/TRAINING PROGRAM

## 2023-02-02 NOTE — PROGRESS NOTES
Postop Progress Note    Subjective    Micki Rizvi presents to the office 4 weeks post-op left common femoral endarterectomy. Doing well. No complaints. Objective    General: alert, cooperative, and no distress  Incision: healing well, no drainage, no erythema, well approximated, minimal swelling    Assessment    Doing well postoperatively. Plan   1. Continue any current medications  2.  F/u 3 mos for BLE Art DUS    Electronically signed by Stacia Campbell MD on 2/2/2023 at 8:27 AM

## 2023-02-03 ENCOUNTER — APPOINTMENT (OUTPATIENT)
Dept: CARDIAC REHAB | Age: 73
End: 2023-02-03
Payer: MEDICARE

## 2023-02-06 ENCOUNTER — APPOINTMENT (OUTPATIENT)
Dept: CARDIAC REHAB | Age: 73
End: 2023-02-06
Payer: MEDICARE

## 2023-02-08 ENCOUNTER — APPOINTMENT (OUTPATIENT)
Dept: CARDIAC REHAB | Age: 73
End: 2023-02-08
Payer: MEDICARE

## 2023-02-10 ENCOUNTER — APPOINTMENT (OUTPATIENT)
Dept: CARDIAC REHAB | Age: 73
End: 2023-02-10
Payer: MEDICARE

## 2023-02-13 ENCOUNTER — HOSPITAL ENCOUNTER (OUTPATIENT)
Dept: CARDIAC REHAB | Age: 73
Setting detail: RECURRING SERIES
Discharge: HOME | End: 2023-02-16
Payer: MEDICARE

## 2023-02-13 ENCOUNTER — APPOINTMENT (OUTPATIENT)
Dept: CARDIAC REHAB | Age: 73
End: 2023-02-13
Payer: MEDICARE

## 2023-02-13 VITALS — WEIGHT: 256 LBS | BODY MASS INDEX: 37.8 KG/M2

## 2023-02-13 PROCEDURE — 93798 PHYS/QHP OP CAR RHAB W/ECG: CPT

## 2023-02-13 ASSESSMENT — EXERCISE STRESS TEST
PEAK_BP: 164/76
PEAK_BP: 140/72
PEAK_HR: 93
PEAK_METS: 2.4

## 2023-02-13 ASSESSMENT — EJECTION FRACTION: EF_VALUE: 55

## 2023-02-13 ASSESSMENT — LIFESTYLE VARIABLES: SMOKELESS_TOBACCO: NO

## 2023-02-15 ENCOUNTER — APPOINTMENT (OUTPATIENT)
Dept: CARDIAC REHAB | Age: 73
End: 2023-02-15
Payer: MEDICARE

## 2023-02-15 ENCOUNTER — HOSPITAL ENCOUNTER (OUTPATIENT)
Dept: CARDIAC REHAB | Age: 73
Setting detail: RECURRING SERIES
Discharge: HOME | End: 2023-02-18
Payer: MEDICARE

## 2023-02-15 PROCEDURE — 93798 PHYS/QHP OP CAR RHAB W/ECG: CPT

## 2023-02-15 ASSESSMENT — EXERCISE STRESS TEST
PEAK_HR: 94
PEAK_BP: 148/78
PEAK_METS: 2.5

## 2023-02-16 VITALS — WEIGHT: 250 LBS | BODY MASS INDEX: 36.92 KG/M2

## 2023-02-17 ENCOUNTER — APPOINTMENT (OUTPATIENT)
Dept: CARDIAC REHAB | Age: 73
End: 2023-02-17
Payer: MEDICARE

## 2023-02-20 ENCOUNTER — HOSPITAL ENCOUNTER (OUTPATIENT)
Dept: CARDIAC REHAB | Age: 73
Setting detail: RECURRING SERIES
Discharge: HOME | End: 2023-02-23
Payer: MEDICARE

## 2023-02-20 ENCOUNTER — APPOINTMENT (OUTPATIENT)
Dept: CARDIAC REHAB | Age: 73
End: 2023-02-20
Payer: MEDICARE

## 2023-02-20 PROCEDURE — 93798 PHYS/QHP OP CAR RHAB W/ECG: CPT

## 2023-02-20 ASSESSMENT — EXERCISE STRESS TEST
PEAK_HR: 92
PEAK_METS: 2.7

## 2023-02-22 ENCOUNTER — APPOINTMENT (OUTPATIENT)
Dept: CARDIAC REHAB | Age: 73
End: 2023-02-22
Payer: MEDICARE

## 2023-02-22 ENCOUNTER — HOSPITAL ENCOUNTER (OUTPATIENT)
Dept: CARDIAC REHAB | Age: 73
Setting detail: RECURRING SERIES
Discharge: HOME | End: 2023-02-25
Payer: MEDICARE

## 2023-02-22 VITALS — WEIGHT: 252.2 LBS | BODY MASS INDEX: 37.24 KG/M2

## 2023-02-22 PROCEDURE — 93798 PHYS/QHP OP CAR RHAB W/ECG: CPT

## 2023-02-22 ASSESSMENT — EXERCISE STRESS TEST
PEAK_HR: 93
PEAK_BP: 140/80
PEAK_METS: 2.7

## 2023-02-24 ENCOUNTER — APPOINTMENT (OUTPATIENT)
Dept: CARDIAC REHAB | Age: 73
End: 2023-02-24
Payer: MEDICARE

## 2023-02-27 ENCOUNTER — APPOINTMENT (OUTPATIENT)
Dept: CARDIAC REHAB | Age: 73
End: 2023-02-27
Payer: MEDICARE

## 2023-02-27 ENCOUNTER — HOSPITAL ENCOUNTER (OUTPATIENT)
Dept: CARDIAC REHAB | Age: 73
Setting detail: RECURRING SERIES
Discharge: HOME | End: 2023-03-02
Payer: MEDICARE

## 2023-02-27 PROCEDURE — 93798 PHYS/QHP OP CAR RHAB W/ECG: CPT

## 2023-02-27 ASSESSMENT — EXERCISE STRESS TEST
PEAK_HR: 92
PEAK_METS: 2.9
PEAK_BP: 140/80

## 2023-02-27 ASSESSMENT — LIFESTYLE VARIABLES: SMOKELESS_TOBACCO: NO

## 2023-02-27 ASSESSMENT — EJECTION FRACTION: EF_VALUE: 55

## 2023-03-01 ENCOUNTER — APPOINTMENT (OUTPATIENT)
Dept: CARDIAC REHAB | Age: 73
End: 2023-03-01
Payer: MEDICARE

## 2023-03-01 ENCOUNTER — HOSPITAL ENCOUNTER (OUTPATIENT)
Dept: CARDIAC REHAB | Age: 73
Setting detail: RECURRING SERIES
Discharge: HOME | End: 2023-03-04
Payer: MEDICARE

## 2023-03-01 VITALS — WEIGHT: 254.3 LBS | BODY MASS INDEX: 37.55 KG/M2

## 2023-03-01 PROCEDURE — 93798 PHYS/QHP OP CAR RHAB W/ECG: CPT

## 2023-03-01 ASSESSMENT — EXERCISE STRESS TEST
PEAK_BP: 160/82
PEAK_METS: 2.9
PEAK_HR: 87

## 2023-03-03 ENCOUNTER — APPOINTMENT (OUTPATIENT)
Dept: CARDIAC REHAB | Age: 73
End: 2023-03-03
Payer: MEDICARE

## 2023-03-06 ENCOUNTER — HOSPITAL ENCOUNTER (OUTPATIENT)
Dept: CARDIAC REHAB | Age: 73
Setting detail: RECURRING SERIES
Discharge: HOME | End: 2023-03-09
Payer: MEDICARE

## 2023-03-06 ENCOUNTER — OFFICE VISIT (OUTPATIENT)
Dept: CARDIOLOGY CLINIC | Age: 73
End: 2023-03-06
Payer: MEDICARE

## 2023-03-06 VITALS
BODY MASS INDEX: 37.18 KG/M2 | WEIGHT: 251 LBS | HEIGHT: 69 IN | DIASTOLIC BLOOD PRESSURE: 74 MMHG | SYSTOLIC BLOOD PRESSURE: 138 MMHG | HEART RATE: 76 BPM

## 2023-03-06 DIAGNOSIS — I10 ESSENTIAL HYPERTENSION, BENIGN: Primary | ICD-10-CM

## 2023-03-06 DIAGNOSIS — Z95.2 S/P TAVR (TRANSCATHETER AORTIC VALVE REPLACEMENT): ICD-10-CM

## 2023-03-06 DIAGNOSIS — I50.20 HFREF (HEART FAILURE WITH REDUCED EJECTION FRACTION) (HCC): ICD-10-CM

## 2023-03-06 PROCEDURE — 93798 PHYS/QHP OP CAR RHAB W/ECG: CPT

## 2023-03-06 PROCEDURE — G8428 CUR MEDS NOT DOCUMENT: HCPCS | Performed by: INTERNAL MEDICINE

## 2023-03-06 PROCEDURE — 1123F ACP DISCUSS/DSCN MKR DOCD: CPT | Performed by: INTERNAL MEDICINE

## 2023-03-06 PROCEDURE — 3017F COLORECTAL CA SCREEN DOC REV: CPT | Performed by: INTERNAL MEDICINE

## 2023-03-06 PROCEDURE — G8484 FLU IMMUNIZE NO ADMIN: HCPCS | Performed by: INTERNAL MEDICINE

## 2023-03-06 PROCEDURE — 3075F SYST BP GE 130 - 139MM HG: CPT | Performed by: INTERNAL MEDICINE

## 2023-03-06 PROCEDURE — 3078F DIAST BP <80 MM HG: CPT | Performed by: INTERNAL MEDICINE

## 2023-03-06 PROCEDURE — 1036F TOBACCO NON-USER: CPT | Performed by: INTERNAL MEDICINE

## 2023-03-06 PROCEDURE — 99214 OFFICE O/P EST MOD 30 MIN: CPT | Performed by: INTERNAL MEDICINE

## 2023-03-06 PROCEDURE — G8417 CALC BMI ABV UP PARAM F/U: HCPCS | Performed by: INTERNAL MEDICINE

## 2023-03-06 ASSESSMENT — ENCOUNTER SYMPTOMS
STRIDOR: 0
COUGH: 0
NAIL CHANGES: 0
EYE PAIN: 0
APHONIA: 0
ABDOMINAL PAIN: 0

## 2023-03-06 ASSESSMENT — EXERCISE STRESS TEST
PEAK_METS: 3
PEAK_BP: 144/60
PEAK_HR: 92

## 2023-03-06 NOTE — PROGRESS NOTES
[address], [address] [place] [address]  PHONE: [phone]    SUBJECTIVE:   Brandon Martin is a 81 y.o. male 1950   seen for a follow up visit regarding the following:     Chief Complaint   Patient presents with    Coronary Artery Disease    Hypertension    6 Month Follow-Up         History of present illness: 81 y.o. male presented for follow-up 3/6/23 history of a flutter ablation as well as low-flow low gradient aortic stenosis. Patient underwent TAVR aortic valve replacement 10/2022 with Dr. Laura Segura  The patient presented for consultation  02/15/2018. Patient with history of coronary artery disease and underwent cardiac catheterization and stenting last in 2012. The patient had additional history of atrial fibrillation and atrial flutter. He presented 02/15/2018 with no changes in symptoms, but has gained a considerable amount of weight over the past several years. He has been treated for this by Dr. Raudel Snow, who recommended additional weight loss measures. The patient is attempting to increase his exercise. He currently walks his dogs on a daily basis. He states this is not rigorous physical activity. Additionally, he has a history of right carotid endarterectomy performed by Dr. Arsenio Dahl 63. Cardiac History:   Cardiac catheterization 2012 - diminished left ventricular systolic function, EF 25%, high grade LAD disease, status post PCI and moderate diffuse disease involving the circumflex and right coronary artery. Atrial flutter ablation. Right sided atrial flutter. The patient is on chronic anticoagulation therapy for atrial fibrillation. History of right carotid endarterectomy.    2022 cath stable CAD   7/8/2022 echocardiogram ejection fraction 20 to 25% aortic valve with severe aortic stenosis mean gradient 44 mmHg  11/2020 2 aortic stenosis status post TAVR 29 mm Medtronic  12/2022 echocardiogram ejection fraction 50 to 55% aortic valve gradients mean gradient 11 mmHg acceleration time 63 ms     Assessment and Plan:   AS  Status post TAVR 10/2022  CHF  Recovery of left ventricular systolic function status post TAVR  Continue Jardiance 10 mg daily Entresto 24/26 carvedilol 12.5 mg p.o. twice daily  Atrial flutter  Status post atrial flutter ablation  Carotid disease. Previous history of carotid endarterectomy. Currently followed by vascular surgery  Hypertension  Key CAD CHF Meds            sacubitril-valsartan (ENTRESTO) 24-26 MG per tablet (Taking)    Sig - Route: Take 1 tablet by mouth 2 times daily - Oral    apixaban (ELIQUIS) 5 MG TABS tablet (Taking)    Class: Historical Med    atorvastatin (LIPITOR) 80 MG tablet (Taking)    Class: Historical Med    carvedilol (COREG) 12.5 MG tablet (Taking)    Class: Historical Med                       Current Outpatient Medications   Medication Sig    empagliflozin (JARDIANCE) 10 MG tablet Take 10 mg by mouth daily    sacubitril-valsartan (ENTRESTO) 24-26 MG per tablet Take 1 tablet by mouth 2 times daily    nitroGLYCERIN (NITROSTAT) 0.4 MG SL tablet Place 1 tablet under the tongue every 5 minutes as needed for Chest pain up to max of 3 total doses. If no relief after 1 dose, call 911.    apixaban (ELIQUIS) 5 MG TABS tablet Take 5 mg by mouth 2 times daily    aspirin 81 MG chewable tablet Take 81 mg by mouth daily    atorvastatin (LIPITOR) 80 MG tablet Take 80 mg by mouth at bedtime TAKE 1 TABLET BY MOUTH EVERY DAY    carvedilol (COREG) 12.5 MG tablet Take 12.5 mg by mouth 2 times daily (with meals)    metFORMIN (GLUCOPHAGE) 1000 MG tablet Take 1,000 mg by mouth 2 times daily (with meals)     No current facility-administered medications for this visit. Past Medical History, Past Surgical History, Family history, Social History, and Medications were all reviewed with the patient today and updated as necessary.        No Known Allergies  Past Medical History:   Diagnosis Date Atrial fibrillation ([facility] 9/15/2013    Benign neoplasm of pineal gland ([facility] 9/15/2013    CAD (coronary artery disease)     stent x 1; Followed by Dr. Phyllis Nova    CHF (congestive heart failure) ([facility]     Current use of long term anticoagulation     Eliquis and Aspirin    Essential hypertension, benign 9/15/2013    H/O heart artery stent     X1    History of basal cell carcinoma     multiple areas removed    History of CEA (carotid endarterectomy)     right    HLD (hyperlipidemia)     managed with medication     HTN (hypertension)     managed with medication     Hypercholesterolemia     Hypertension 7/12/2013    Irregular heart beat     cardiac ablation corrected    LBBB (left bundle branch block)     Obesity     BMI: 36.7    Peripheral vascular disease, unspecified [facility] 9/15/2013    Psoriasis     S/P ablation of atrial flutter     Severe aortic stenosis     s/p TVAR 11/2022    Type 2 diabetes mellitus ([facility] 2012    oral agent only/ AVG / no S/S of low BS/ Last A1c:    Unspecified sleep apnea     no tx at this time- never tested for WOJCIECH     Past Surgical History:   Procedure Laterality Date    CARDIAC PROCEDURE N/A 07/09/2022    LEFT HEART CATH / CORONARY ANGIOGRAPHY performed by Faizan Albarran MD at [facility] CATH LAB    CARDIAC PROCEDURE N/A 11/08/2022    Transcatheter aortic valve replacement performed by Kaykay Barron MD at [address] [place] [address] N/A 11/08/2022    Coronary angiography performed by Kaykay Barron MD at [place] [address] Right 07/2013    EP DEVICE PROCEDURE N/A 07/11/2022    ABLATION A-FLUTTER performed by Leonor Felty, MD at [facility] CATH LAB    FEMORAL ENDARTERECTOMY Left 1/6/2023    LEFT FEMORAL ENDARTERECTOMY WITH CONTRALATERAL VEIN PATCH performed by Jeevan Dejesus MD at [facility] MAIN OR    [address]  6/27/2012    Xience to mid LAD     Family History   Problem Relation Age of Onset    Diabetes Mother     Heart Disease Mother     Hypertension Mother     Cancer Father     No Known Problems Sister     No Known Problems Sister       Social History     Tobacco Use    Smoking status: Former     Types: Cigars     Quit date: 2021     Years since quittin.7    Smokeless tobacco: Never    Tobacco comments:     Never smoked cigarettes. Substance Use Topics    Alcohol use: Yes     Comment: rarely       ROS:    Review of Systems   Constitutional: Negative for fever. HENT:  Negative for stridor. Eyes:  Negative for pain. Cardiovascular:  Negative for chest pain. Respiratory:  Negative for cough. Endocrine: Negative for cold intolerance. Skin:  Negative for nail changes. Musculoskeletal:  Negative for arthritis. Gastrointestinal:  Negative for abdominal pain. Genitourinary:  Negative for dysuria. Neurological:  Negative for aphonia. Psychiatric/Behavioral:  Negative for altered mental status. Allergic/Immunologic: Negative for hives. PHYSICAL EXAM:    /74   Pulse 76   Ht 5' 9\" (1.753 m)   Wt 251 lb (113.9 kg)   BMI 37.07 kg/m²        Wt Readings from Last 3 Encounters:   23 251 lb (113.9 kg)   23 254 lb 4.8 oz (115.3 kg)   23 252 lb 3.2 oz (114.4 kg)     BP Readings from Last 3 Encounters:   23 138/74   23 (!) 153/73   23 (!) 153/82         Physical Exam  Vitals reviewed. HENT:      Head: Normocephalic. Right Ear: External ear normal.      Left Ear: External ear normal.      Nose: Nose normal.   Eyes:      General: No scleral icterus. Cardiovascular:      Heart sounds: Murmur heard. Systolic murmur is present. Pulmonary:      Effort: Pulmonary effort is normal.   Abdominal:      General: There is no distension. Musculoskeletal:      Cervical back: Neck supple. Skin:     General: Skin is warm. Findings: Rash present. Neurological:      Mental Status: He is alert. Mental status is at baseline.        Medical problems and test results were reviewed with the patient today. No results found for this or any previous visit (from the past 672 hour(s)). Lab Results   Component Value Date/Time    CHOL 99 07/09/2022 06:40 AM    HDL 29 07/09/2022 06:40 AM       No results found for any visits on 03/06/23. Carmelina Roberts was seen today for coronary artery disease, hypertension and 6 month follow-up. Diagnoses and all orders for this visit:    Essential hypertension, benign    HFrEF (heart failure with reduced ejection fraction) ([facility])    S/P TAVR (transcatheter aortic valve replacement)    Return in about 6 months (around 9/6/2023).        Zahira Velez MD  3/6/2023  8:31 AM

## 2023-03-08 ENCOUNTER — HOSPITAL ENCOUNTER (OUTPATIENT)
Dept: CARDIAC REHAB | Age: 73
Setting detail: RECURRING SERIES
Discharge: HOME | End: 2023-03-11
Payer: MEDICARE

## 2023-03-08 VITALS — BODY MASS INDEX: 36.86 KG/M2 | WEIGHT: 249.6 LBS

## 2023-03-08 PROCEDURE — 93798 PHYS/QHP OP CAR RHAB W/ECG: CPT

## 2023-03-08 ASSESSMENT — EXERCISE STRESS TEST
PEAK_HR: 84
PEAK_METS: 3.2
PEAK_BP: 140/74

## 2023-03-10 ENCOUNTER — APPOINTMENT (OUTPATIENT)
Dept: CARDIAC REHAB | Age: 73
End: 2023-03-10
Payer: MEDICARE

## 2023-03-13 ENCOUNTER — HOSPITAL ENCOUNTER (OUTPATIENT)
Dept: CARDIAC REHAB | Age: 73
Setting detail: RECURRING SERIES
Discharge: HOME | End: 2023-03-16
Payer: MEDICARE

## 2023-03-13 PROCEDURE — 93798 PHYS/QHP OP CAR RHAB W/ECG: CPT

## 2023-03-13 ASSESSMENT — EXERCISE STRESS TEST
PEAK_BP: 124/80
PEAK_METS: 3.2
PEAK_HR: 102

## 2023-03-15 ENCOUNTER — HOSPITAL ENCOUNTER (OUTPATIENT)
Dept: CARDIAC REHAB | Age: 73
Setting detail: RECURRING SERIES
Discharge: HOME | End: 2023-03-18
Payer: MEDICARE

## 2023-03-15 VITALS — WEIGHT: 249.5 LBS | BODY MASS INDEX: 36.84 KG/M2

## 2023-03-15 PROCEDURE — 93798 PHYS/QHP OP CAR RHAB W/ECG: CPT

## 2023-03-15 ASSESSMENT — EXERCISE STRESS TEST
PEAK_METS: 3.2
PEAK_HR: 90

## 2023-03-20 ENCOUNTER — HOSPITAL ENCOUNTER (OUTPATIENT)
Dept: CARDIAC REHAB | Age: 73
Setting detail: RECURRING SERIES
Discharge: HOME | End: 2023-03-23
Payer: MEDICARE

## 2023-03-20 PROCEDURE — 93798 PHYS/QHP OP CAR RHAB W/ECG: CPT

## 2023-03-20 ASSESSMENT — EXERCISE STRESS TEST
PEAK_METS: 3.2
PEAK_HR: 90

## 2023-03-22 ENCOUNTER — HOSPITAL ENCOUNTER (OUTPATIENT)
Dept: CARDIAC REHAB | Age: 73
Setting detail: RECURRING SERIES
Discharge: HOME | End: 2023-03-25
Payer: MEDICARE

## 2023-03-22 VITALS — BODY MASS INDEX: 36.62 KG/M2 | WEIGHT: 248 LBS

## 2023-03-22 PROCEDURE — 93798 PHYS/QHP OP CAR RHAB W/ECG: CPT

## 2023-03-22 ASSESSMENT — EXERCISE STRESS TEST
PEAK_METS: 3.2
PEAK_BP: 136/80
PEAK_HR: 106

## 2023-03-27 ENCOUNTER — HOSPITAL ENCOUNTER (OUTPATIENT)
Dept: CARDIAC REHAB | Age: 73
Setting detail: RECURRING SERIES
Discharge: HOME | End: 2023-03-30
Payer: MEDICARE

## 2023-03-27 PROCEDURE — 93798 PHYS/QHP OP CAR RHAB W/ECG: CPT

## 2023-03-27 ASSESSMENT — EXERCISE STRESS TEST
PEAK_METS: 3.2
PEAK_HR: 103
PEAK_BP: 140/82

## 2023-03-28 ASSESSMENT — LIFESTYLE VARIABLES: SMOKELESS_TOBACCO: NO

## 2023-03-28 ASSESSMENT — EXERCISE STRESS TEST: PEAK_BP: 152/84

## 2023-03-28 ASSESSMENT — EJECTION FRACTION: EF_VALUE: 55

## 2023-03-29 ENCOUNTER — HOSPITAL ENCOUNTER (OUTPATIENT)
Dept: CARDIAC REHAB | Age: 73
Setting detail: RECURRING SERIES
Discharge: HOME | End: 2023-04-01
Payer: MEDICARE

## 2023-03-29 PROCEDURE — 93798 PHYS/QHP OP CAR RHAB W/ECG: CPT

## 2023-03-29 ASSESSMENT — EXERCISE STRESS TEST
PEAK_METS: 3.2
PEAK_HR: 97
PEAK_BP: 142/76

## 2023-03-31 ENCOUNTER — APPOINTMENT (OUTPATIENT)
Dept: CARDIAC REHAB | Age: 73
End: 2023-03-31
Payer: MEDICARE

## 2023-03-31 VITALS — WEIGHT: 250.4 LBS | BODY MASS INDEX: 36.98 KG/M2

## 2023-04-05 ENCOUNTER — HOSPITAL ENCOUNTER (OUTPATIENT)
Dept: CARDIAC REHAB | Age: 73
Setting detail: RECURRING SERIES
Discharge: HOME | End: 2023-04-08
Payer: MEDICARE

## 2023-04-05 VITALS — BODY MASS INDEX: 36.8 KG/M2 | WEIGHT: 249.2 LBS

## 2023-04-05 PROCEDURE — 93798 PHYS/QHP OP CAR RHAB W/ECG: CPT

## 2023-04-05 ASSESSMENT — EXERCISE STRESS TEST
PEAK_METS: 3.2
PEAK_HR: 90
PEAK_BP: 120/64

## 2023-04-17 ENCOUNTER — HOSPITAL ENCOUNTER (OUTPATIENT)
Dept: CARDIAC REHAB | Age: 73
Setting detail: RECURRING SERIES
Discharge: HOME | End: 2023-04-20
Payer: MEDICARE

## 2023-04-17 PROCEDURE — 93798 PHYS/QHP OP CAR RHAB W/ECG: CPT

## 2023-04-17 ASSESSMENT — EXERCISE STRESS TEST
PEAK_HR: 94
PEAK_BP: 150/72
PEAK_METS: 3.3

## 2023-04-19 ENCOUNTER — HOSPITAL ENCOUNTER (OUTPATIENT)
Dept: CARDIAC REHAB | Age: 73
Setting detail: RECURRING SERIES
Discharge: HOME | End: 2023-04-22
Payer: MEDICARE

## 2023-04-19 VITALS — WEIGHT: 250 LBS | BODY MASS INDEX: 36.92 KG/M2

## 2023-04-19 PROCEDURE — 93798 PHYS/QHP OP CAR RHAB W/ECG: CPT

## 2023-04-19 ASSESSMENT — EXERCISE STRESS TEST
PEAK_METS: 3.3
PEAK_HR: 93

## 2023-04-24 ENCOUNTER — OFFICE VISIT (OUTPATIENT)
Dept: CARDIOLOGY CLINIC | Age: 73
End: 2023-04-24
Payer: MEDICARE

## 2023-04-24 ENCOUNTER — HOSPITAL ENCOUNTER (OUTPATIENT)
Dept: CARDIAC REHAB | Age: 73
Setting detail: RECURRING SERIES
Discharge: HOME | End: 2023-04-27
Payer: MEDICARE

## 2023-04-24 VITALS
SYSTOLIC BLOOD PRESSURE: 136 MMHG | WEIGHT: 253 LBS | DIASTOLIC BLOOD PRESSURE: 72 MMHG | HEIGHT: 69 IN | HEART RATE: 72 BPM | BODY MASS INDEX: 37.47 KG/M2

## 2023-04-24 DIAGNOSIS — Z95.2 S/P TAVR (TRANSCATHETER AORTIC VALVE REPLACEMENT): Primary | Chronic | ICD-10-CM

## 2023-04-24 DIAGNOSIS — I65.29 STENOSIS OF CAROTID ARTERY, UNSPECIFIED LATERALITY: ICD-10-CM

## 2023-04-24 DIAGNOSIS — I50.20 HFREF (HEART FAILURE WITH REDUCED EJECTION FRACTION) (HCC): ICD-10-CM

## 2023-04-24 DIAGNOSIS — E78.5 DYSLIPIDEMIA: ICD-10-CM

## 2023-04-24 DIAGNOSIS — I48.3 TYPICAL ATRIAL FLUTTER (HCC): ICD-10-CM

## 2023-04-24 DIAGNOSIS — M79.605 LEFT LEG PAIN: Chronic | ICD-10-CM

## 2023-04-24 DIAGNOSIS — I25.10 CORONARY ARTERY DISEASE INVOLVING NATIVE CORONARY ARTERY OF NATIVE HEART WITHOUT ANGINA PECTORIS: ICD-10-CM

## 2023-04-24 DIAGNOSIS — I10 ESSENTIAL HYPERTENSION, BENIGN: ICD-10-CM

## 2023-04-24 PROCEDURE — G8428 CUR MEDS NOT DOCUMENT: HCPCS | Performed by: INTERNAL MEDICINE

## 2023-04-24 PROCEDURE — 99214 OFFICE O/P EST MOD 30 MIN: CPT | Performed by: INTERNAL MEDICINE

## 2023-04-24 PROCEDURE — 3017F COLORECTAL CA SCREEN DOC REV: CPT | Performed by: INTERNAL MEDICINE

## 2023-04-24 PROCEDURE — 3078F DIAST BP <80 MM HG: CPT | Performed by: INTERNAL MEDICINE

## 2023-04-24 PROCEDURE — G8417 CALC BMI ABV UP PARAM F/U: HCPCS | Performed by: INTERNAL MEDICINE

## 2023-04-24 PROCEDURE — 93798 PHYS/QHP OP CAR RHAB W/ECG: CPT

## 2023-04-24 PROCEDURE — 1036F TOBACCO NON-USER: CPT | Performed by: INTERNAL MEDICINE

## 2023-04-24 PROCEDURE — 3075F SYST BP GE 130 - 139MM HG: CPT | Performed by: INTERNAL MEDICINE

## 2023-04-24 PROCEDURE — 1123F ACP DISCUSS/DSCN MKR DOCD: CPT | Performed by: INTERNAL MEDICINE

## 2023-04-24 RX ORDER — VALSARTAN 160 MG/1
160 TABLET ORAL DAILY
Qty: 90 TABLET | Refills: 3 | Status: SHIPPED | OUTPATIENT
Start: 2023-04-24

## 2023-04-24 ASSESSMENT — EXERCISE STRESS TEST
PEAK_HR: 94
PEAK_BP: 132/68
PEAK_METS: 3.3

## 2023-04-24 ASSESSMENT — ENCOUNTER SYMPTOMS
ABDOMINAL PAIN: 0
SHORTNESS OF BREATH: 0
COUGH: 0
BACK PAIN: 0

## 2023-04-24 NOTE — PROGRESS NOTES
[facility] CARDIOLOGY  [address], [address]      23      NAME:  Vashti Orantes  : 1950  MRN: [id number]      SUBJECTIVE:   Vashti Orantes is a 81 y.o. male seen for a follow up visit regarding the following:     Chief Complaint   Patient presents with    Hypertension    Coronary Artery Disease       HPI:   81 y.o. male with history heart failure with reduced ejection fraction, wide-complex tachycardia that was noted to be atrial flutter now status post ablation, hypertension, dyslipidemia, mild to moderate nonobstructive CAD and severe aortic stenosis. LVEF normalized post ablation. Patient is now status post TAVR 2022 with 29 mm Medtronic evolute pro plus valve. Past Medical History, Past Surgical History, Family history, Social History, and Medications were all reviewed with the patient today and updated as necessary. Current Outpatient Medications   Medication Sig Dispense Refill    valsartan (DIOVAN) 160 MG tablet Take 1 tablet by mouth daily 90 tablet 3    empagliflozin (JARDIANCE) 10 MG tablet Take 1 tablet by mouth daily      nitroGLYCERIN (NITROSTAT) 0.4 MG SL tablet Place 1 tablet under the tongue every 5 minutes as needed for Chest pain up to max of 3 total doses. If no relief after 1 dose, call 911. 25 tablet 3    apixaban (ELIQUIS) 5 MG TABS tablet Take 1 tablet by mouth 2 times daily      aspirin 81 MG chewable tablet Take 1 tablet by mouth daily      atorvastatin (LIPITOR) 80 MG tablet Take 1 tablet by mouth at bedtime TAKE 1 TABLET BY MOUTH EVERY DAY      carvedilol (COREG) 12.5 MG tablet Take 1 tablet by mouth 2 times daily (with meals)      metFORMIN (GLUCOPHAGE) 1000 MG tablet Take 1 tablet by mouth 2 times daily (with meals)       No current facility-administered medications for this visit.      Patient Active Problem List    Diagnosis Date Noted    Left leg pain 2022     Priority: High    PAD (peripheral artery disease)

## 2023-04-25 ASSESSMENT — EJECTION FRACTION: EF_VALUE: 55

## 2023-04-25 ASSESSMENT — LIFESTYLE VARIABLES: SMOKELESS_TOBACCO: NO

## 2023-04-25 ASSESSMENT — EXERCISE STRESS TEST: PEAK_BP: 132/68

## 2023-04-28 ENCOUNTER — HOSPITAL ENCOUNTER (OUTPATIENT)
Dept: CARDIAC REHAB | Age: 73
Setting detail: RECURRING SERIES
Discharge: HOME | End: 2023-05-01
Payer: MEDICARE

## 2023-04-28 PROCEDURE — 93798 PHYS/QHP OP CAR RHAB W/ECG: CPT

## 2023-04-28 ASSESSMENT — EXERCISE STRESS TEST
PEAK_METS: 3.3
PEAK_HR: 92

## 2023-05-01 ENCOUNTER — OFFICE VISIT (OUTPATIENT)
Dept: VASCULAR SURGERY | Age: 73
End: 2023-05-01
Payer: MEDICARE

## 2023-05-01 VITALS
OXYGEN SATURATION: 98 % | HEART RATE: 67 BPM | BODY MASS INDEX: 37.77 KG/M2 | DIASTOLIC BLOOD PRESSURE: 93 MMHG | HEIGHT: 69 IN | SYSTOLIC BLOOD PRESSURE: 189 MMHG | WEIGHT: 255 LBS

## 2023-05-01 DIAGNOSIS — I70.209 OCCLUSION OF COMMON FEMORAL ARTERY (HCC): Primary | ICD-10-CM

## 2023-05-01 PROCEDURE — G8427 DOCREV CUR MEDS BY ELIG CLIN: HCPCS | Performed by: STUDENT IN AN ORGANIZED HEALTH CARE EDUCATION/TRAINING PROGRAM

## 2023-05-01 PROCEDURE — 3077F SYST BP >= 140 MM HG: CPT | Performed by: STUDENT IN AN ORGANIZED HEALTH CARE EDUCATION/TRAINING PROGRAM

## 2023-05-01 PROCEDURE — 1123F ACP DISCUSS/DSCN MKR DOCD: CPT | Performed by: STUDENT IN AN ORGANIZED HEALTH CARE EDUCATION/TRAINING PROGRAM

## 2023-05-01 PROCEDURE — 3017F COLORECTAL CA SCREEN DOC REV: CPT | Performed by: STUDENT IN AN ORGANIZED HEALTH CARE EDUCATION/TRAINING PROGRAM

## 2023-05-01 PROCEDURE — 1036F TOBACCO NON-USER: CPT | Performed by: STUDENT IN AN ORGANIZED HEALTH CARE EDUCATION/TRAINING PROGRAM

## 2023-05-01 PROCEDURE — 3079F DIAST BP 80-89 MM HG: CPT | Performed by: STUDENT IN AN ORGANIZED HEALTH CARE EDUCATION/TRAINING PROGRAM

## 2023-05-01 PROCEDURE — G8417 CALC BMI ABV UP PARAM F/U: HCPCS | Performed by: STUDENT IN AN ORGANIZED HEALTH CARE EDUCATION/TRAINING PROGRAM

## 2023-05-01 PROCEDURE — 99214 OFFICE O/P EST MOD 30 MIN: CPT | Performed by: STUDENT IN AN ORGANIZED HEALTH CARE EDUCATION/TRAINING PROGRAM

## 2023-05-01 NOTE — PROGRESS NOTES
[address]   [address] [facility] FAX: [phone]    Madison Rizvi  : 1950      Reason for visit: s/p left femoral endarterectomy     Chief Complaint: 81 y.o. male left femoral endarterectomy with patch angioplasty 23 following TAVR procedure with left femoral access. Patient reports that he is participating in physical therapy without any claudication. Patient is without symptoms of rest pain or wounds in the lower extremities. Patient is compliant with aspirin, Eliquis, and statin. Plan: f/u 6mo with BLE Art DUS with ABIs    Level 4 and Progression of chronic illness    Ambulatory status: Without claudication    Physical Examination:   Height: 5' 9\" (1.753 m), Weight: 255 lb (115.7 kg), BP: (!) 189/93    General:Well-developed. No distress. HENT: AT  CV: Regular rhythm. LUNG: Effort normal and breath sounds normal. No respiratory distress. Abdominal: Soft. Bowel sounds are normal. he exhibits no distension. There is no tenderness. There is no guarding. No hernia. Extremities:Psoriatic patches, no wounds of feet, motor and sensation intact   Vascular: Radial:, L, 2+ , R, 2+  , Femoral:, L, 2+ , R, 2+  , DP:, L, 2+ , R, 2+      Constitutional:   Negative for fevers and unexplained weight loss. Eyes:   Negative for visual disturbance. ENT:   Negative for significant hearing loss and tinnitus. Respiratory:   Negative for hemoptysis. Cardiovascular:   Negative except as noted in HPI. Gastrointestinal:   Negative for melena and abdominal pain. Genitourinary:   Negative for hematuria, renal stones. Integumentary:   Negative for rash or non-healing wounds  Hematologic/Lymphatic:   Negative for excessive bleeding hx or clotting disorder. Musculoskeletal:  Negative for active, unexplained/severe joint pain. Neurological:   Negative for stroke. Behavioral/Psych:   Negative for suicidal ideations.     Endocrine:   Negative for uncontrolled

## 2023-05-03 ENCOUNTER — HOSPITAL ENCOUNTER (EMERGENCY)
Age: 73
Discharge: HOME | End: 2023-05-03
Attending: GENERAL PRACTICE
Payer: MEDICARE

## 2023-05-03 ENCOUNTER — HOSPITAL ENCOUNTER (OUTPATIENT)
Dept: CARDIAC REHAB | Age: 73
Setting detail: RECURRING SERIES
Discharge: HOME | End: 2023-05-06
Payer: MEDICARE

## 2023-05-03 VITALS
SYSTOLIC BLOOD PRESSURE: 145 MMHG | DIASTOLIC BLOOD PRESSURE: 96 MMHG | WEIGHT: 250 LBS | OXYGEN SATURATION: 94 % | HEART RATE: 78 BPM | HEIGHT: 69 IN | BODY MASS INDEX: 37.03 KG/M2 | RESPIRATION RATE: 18 BRPM | TEMPERATURE: 98.6 F

## 2023-05-03 VITALS — BODY MASS INDEX: 36.92 KG/M2 | WEIGHT: 250 LBS

## 2023-05-03 DIAGNOSIS — T14.8XXA SKIN AVULSION: Primary | ICD-10-CM

## 2023-05-03 PROCEDURE — 6370000000 HC RX 637 (ALT 250 FOR IP)

## 2023-05-03 PROCEDURE — 99283 EMERGENCY DEPT VISIT LOW MDM: CPT

## 2023-05-03 PROCEDURE — 93798 PHYS/QHP OP CAR RHAB W/ECG: CPT

## 2023-05-03 RX ORDER — NYSTATIN 100000 [USP'U]/G
POWDER TOPICAL
Qty: 30 G | Refills: 1 | Status: SHIPPED | OUTPATIENT
Start: 2023-05-03

## 2023-05-03 RX ADMIN — Medication 1 EACH: at 16:00

## 2023-05-03 ASSESSMENT — PAIN SCALES - GENERAL: PAINLEVEL_OUTOF10: 5

## 2023-05-03 ASSESSMENT — PAIN DESCRIPTION - ORIENTATION: ORIENTATION: LEFT

## 2023-05-03 ASSESSMENT — PAIN DESCRIPTION - LOCATION: LOCATION: HAND

## 2023-05-03 ASSESSMENT — EXERCISE STRESS TEST
PEAK_BP: 106/74
PEAK_HR: 88
PEAK_METS: 3.3

## 2023-05-03 ASSESSMENT — LIFESTYLE VARIABLES
HOW OFTEN DO YOU HAVE A DRINK CONTAINING ALCOHOL: NEVER
HOW MANY STANDARD DRINKS CONTAINING ALCOHOL DO YOU HAVE ON A TYPICAL DAY: PATIENT DOES NOT DRINK

## 2023-05-03 ASSESSMENT — ENCOUNTER SYMPTOMS
COUGH: 0
VOMITING: 0
ABDOMINAL PAIN: 0
NAUSEA: 0

## 2023-05-03 ASSESSMENT — PAIN DESCRIPTION - DESCRIPTORS: DESCRIPTORS: THROBBING

## 2023-05-03 NOTE — ED TRIAGE NOTES
Patient arrives to ED pov from home. Patient reports he was cutting in the garden and cut the tip of his left ring finger. Patient reports he can't get it to stop bleeding. Patient reports he is on Eliquis.

## 2023-05-03 NOTE — DISCHARGE INSTRUCTIONS
Please make an appointment with your primary care doctor for close follow-up. If you have any new or worsening symptoms in any way as discussed, return here immediately for further treatment and evaluation.

## 2023-05-03 NOTE — ED PROVIDER NOTES
Emergency Department Provider Note       PCP: Archana Montile MD   Age: 81 y.o. Sex: male     DISPOSITION Decision To Discharge 05/03/2023 04:42:46 PM       ICD-10-CM    1. Skin avulsion  T14. 8XXA           Medical Decision Making     Complexity of Problems Addressed:  1 or more acute illnesses that pose a threat to life or bodily function. Data Reviewed and Analyzed:  Category 1:   I independently ordered and reviewed each unique test.  Category 3: Discussion of management or test interpretation. This patient is a 70-year-old male who is anticoagulated due to his history of A-fib who presents today due to a bleeding laceration on his left fourth digit that he is unable to get to stop bleeding. Patient presents in no acute distress. He is hypertensive with a blood pressure 179/79 however his vitals are otherwise stable and within normal limits. He does have a 1 cm avulsion of the skin to the pad of his fourth right digit. Hemostasis was not achieved upon arrival which is actually what brought the patient in today as he is anticoagulated. Several techniques were attempted to achieve hemostasis including direct pressure, cautery using silver nitrate, and elevation of the wound over his head. Hemostasis was achieved with Xeroform gauze and gauze impregnated with clotting agent. Patient refused blood work today that I advised as he is anticoagulated and is losing significant amount of blood while here in the emergency department. He states that as he is otherwise asymptomatic, he does not want any blood work and is not concerned about losing too much blood. He states that he knows how that feels and he does not feel that way today. I discussed this patient's case with my attending physician, Dr. Ruddy Bermudez, who agreed with my treatment and disposition today. His last tetanus shot was a year ago. We discussed conservative care measures of his wound and return precautions.   The patient verbalized

## 2023-05-05 ENCOUNTER — APPOINTMENT (OUTPATIENT)
Dept: CARDIAC REHAB | Age: 73
End: 2023-05-05
Payer: MEDICARE

## 2023-05-25 ASSESSMENT — EJECTION FRACTION: EF_VALUE: 55

## 2023-05-25 ASSESSMENT — LIFESTYLE VARIABLES: SMOKELESS_TOBACCO: NO

## 2023-05-31 ENCOUNTER — HOSPITAL ENCOUNTER (OUTPATIENT)
Dept: CARDIAC REHAB | Age: 73
Setting detail: RECURRING SERIES
Discharge: HOME | End: 2023-06-03
Payer: MEDICARE

## 2023-05-31 PROCEDURE — 93798 PHYS/QHP OP CAR RHAB W/ECG: CPT

## 2023-05-31 ASSESSMENT — EXERCISE STRESS TEST
PEAK_METS: 3.3
PEAK_HR: 89
PEAK_BP: 154/80

## 2023-06-05 ENCOUNTER — HOSPITAL ENCOUNTER (OUTPATIENT)
Dept: CARDIAC REHAB | Age: 73
Setting detail: RECURRING SERIES
Discharge: HOME | End: 2023-06-08
Payer: MEDICARE

## 2023-06-05 PROCEDURE — 93798 PHYS/QHP OP CAR RHAB W/ECG: CPT

## 2023-06-05 ASSESSMENT — EXERCISE STRESS TEST
PEAK_METS: 3.3
PEAK_HR: 102
PEAK_BP: 142/70

## 2023-06-07 ENCOUNTER — HOSPITAL ENCOUNTER (OUTPATIENT)
Dept: CARDIAC REHAB | Age: 73
Setting detail: RECURRING SERIES
Discharge: HOME | End: 2023-06-10
Payer: MEDICARE

## 2023-06-07 PROCEDURE — 93798 PHYS/QHP OP CAR RHAB W/ECG: CPT

## 2023-06-07 ASSESSMENT — EXERCISE STRESS TEST
PEAK_HR: 86
PEAK_METS: 3.3

## 2023-07-17 ASSESSMENT — LIFESTYLE VARIABLES: SMOKELESS_TOBACCO: NO

## 2023-07-17 ASSESSMENT — EXERCISE STRESS TEST: PEAK_BP: 124/62

## 2023-07-17 ASSESSMENT — EJECTION FRACTION: EF_VALUE: 55

## 2023-09-06 ENCOUNTER — OFFICE VISIT (OUTPATIENT)
Age: 73
End: 2023-09-06
Payer: MEDICARE

## 2023-09-06 VITALS
HEIGHT: 69 IN | BODY MASS INDEX: 38.21 KG/M2 | SYSTOLIC BLOOD PRESSURE: 138 MMHG | DIASTOLIC BLOOD PRESSURE: 80 MMHG | HEART RATE: 64 BPM | WEIGHT: 258 LBS

## 2023-09-06 DIAGNOSIS — I50.20 HFREF (HEART FAILURE WITH REDUCED EJECTION FRACTION) (HCC): Primary | ICD-10-CM

## 2023-09-06 DIAGNOSIS — I10 ESSENTIAL HYPERTENSION, BENIGN: ICD-10-CM

## 2023-09-06 DIAGNOSIS — I25.10 CORONARY ARTERY DISEASE INVOLVING NATIVE CORONARY ARTERY OF NATIVE HEART WITHOUT ANGINA PECTORIS: ICD-10-CM

## 2023-09-06 DIAGNOSIS — E78.5 HYPERLIPIDEMIA LDL GOAL <70: ICD-10-CM

## 2023-09-06 DIAGNOSIS — Z95.2 S/P TAVR (TRANSCATHETER AORTIC VALVE REPLACEMENT): ICD-10-CM

## 2023-09-06 PROCEDURE — 1036F TOBACCO NON-USER: CPT | Performed by: INTERNAL MEDICINE

## 2023-09-06 PROCEDURE — G8427 DOCREV CUR MEDS BY ELIG CLIN: HCPCS | Performed by: INTERNAL MEDICINE

## 2023-09-06 PROCEDURE — 99214 OFFICE O/P EST MOD 30 MIN: CPT | Performed by: INTERNAL MEDICINE

## 2023-09-06 PROCEDURE — 1123F ACP DISCUSS/DSCN MKR DOCD: CPT | Performed by: INTERNAL MEDICINE

## 2023-09-06 PROCEDURE — 3079F DIAST BP 80-89 MM HG: CPT | Performed by: INTERNAL MEDICINE

## 2023-09-06 PROCEDURE — G8417 CALC BMI ABV UP PARAM F/U: HCPCS | Performed by: INTERNAL MEDICINE

## 2023-09-06 PROCEDURE — 3075F SYST BP GE 130 - 139MM HG: CPT | Performed by: INTERNAL MEDICINE

## 2023-09-06 PROCEDURE — 3017F COLORECTAL CA SCREEN DOC REV: CPT | Performed by: INTERNAL MEDICINE

## 2023-09-06 RX ORDER — SEMAGLUTIDE 0.68 MG/ML
INJECTION, SOLUTION SUBCUTANEOUS
COMMUNITY
Start: 2023-08-04 | End: 2023-09-29

## 2023-09-06 RX ORDER — SACUBITRIL AND VALSARTAN 24; 26 MG/1; MG/1
1 TABLET, FILM COATED ORAL 2 TIMES DAILY
COMMUNITY

## 2023-09-06 RX ORDER — IRBESARTAN 300 MG/1
TABLET ORAL
COMMUNITY
Start: 2023-07-07 | End: 2023-09-06 | Stop reason: ALTCHOICE

## 2023-09-06 ASSESSMENT — ENCOUNTER SYMPTOMS
NAIL CHANGES: 0
COUGH: 0
EYE PAIN: 0
STRIDOR: 0
APHONIA: 0
ABDOMINAL PAIN: 0

## 2023-09-06 NOTE — PROGRESS NOTES
replacement)    Essential hypertension, benign    Coronary artery disease involving native coronary artery of native heart without angina pectoris    Hyperlipidemia LDL goal <70      Return in about 6 months (around 3/6/2024).        Rosalina Merrill MD  9/6/2023  8:14 AM

## 2023-10-09 ENCOUNTER — APPOINTMENT (OUTPATIENT)
Dept: GENERAL RADIOLOGY | Age: 73
End: 2023-10-09
Payer: MEDICARE

## 2023-10-09 ENCOUNTER — HOSPITAL ENCOUNTER (EMERGENCY)
Age: 73
Discharge: HOME | End: 2023-10-09
Payer: MEDICARE

## 2023-10-09 VITALS
SYSTOLIC BLOOD PRESSURE: 155 MMHG | WEIGHT: 252 LBS | HEIGHT: 69 IN | BODY MASS INDEX: 37.33 KG/M2 | HEART RATE: 76 BPM | TEMPERATURE: 98.1 F | RESPIRATION RATE: 19 BRPM | DIASTOLIC BLOOD PRESSURE: 84 MMHG | OXYGEN SATURATION: 100 %

## 2023-10-09 DIAGNOSIS — S50.851A: Primary | ICD-10-CM

## 2023-10-09 PROCEDURE — 73090 X-RAY EXAM OF FOREARM: CPT

## 2023-10-09 PROCEDURE — 6370000000 HC RX 637 (ALT 250 FOR IP): Performed by: PHYSICIAN ASSISTANT

## 2023-10-09 PROCEDURE — 10120 INC&RMVL FB SUBQ TISS SMPL: CPT

## 2023-10-09 PROCEDURE — 99283 EMERGENCY DEPT VISIT LOW MDM: CPT

## 2023-10-09 RX ORDER — CLINDAMYCIN HYDROCHLORIDE 150 MG/1
450 CAPSULE ORAL
Status: COMPLETED | OUTPATIENT
Start: 2023-10-09 | End: 2023-10-09

## 2023-10-09 RX ORDER — CLINDAMYCIN HYDROCHLORIDE 300 MG/1
300 CAPSULE ORAL 4 TIMES DAILY
Qty: 40 CAPSULE | Refills: 0 | Status: SHIPPED | OUTPATIENT
Start: 2023-10-09 | End: 2023-10-19

## 2023-10-09 RX ADMIN — CLINDAMYCIN HYDROCHLORIDE 450 MG: 150 CAPSULE ORAL at 17:14

## 2023-10-09 ASSESSMENT — PATIENT HEALTH QUESTIONNAIRE - PHQ9
2. FEELING DOWN, DEPRESSED OR HOPELESS: 0
1. LITTLE INTEREST OR PLEASURE IN DOING THINGS: 0
SUM OF ALL RESPONSES TO PHQ QUESTIONS 1-9: 0
SUM OF ALL RESPONSES TO PHQ9 QUESTIONS 1 & 2: 0
SUM OF ALL RESPONSES TO PHQ QUESTIONS 1-9: 0

## 2023-10-09 ASSESSMENT — PAIN DESCRIPTION - LOCATION: LOCATION: ARM

## 2023-10-09 ASSESSMENT — LIFESTYLE VARIABLES
HOW MANY STANDARD DRINKS CONTAINING ALCOHOL DO YOU HAVE ON A TYPICAL DAY: PATIENT DOES NOT DRINK
HOW OFTEN DO YOU HAVE A DRINK CONTAINING ALCOHOL: NEVER

## 2023-10-09 ASSESSMENT — PAIN SCALES - GENERAL: PAINLEVEL_OUTOF10: 3

## 2023-10-09 ASSESSMENT — PAIN - FUNCTIONAL ASSESSMENT: PAIN_FUNCTIONAL_ASSESSMENT: 0-10

## 2023-10-09 NOTE — ED TRIAGE NOTES
Patient ambulatory to triage with CO right arm pain with concern part of drill bit is in arm. Injury 10/5. Unknown last tetanus. Some redness and swelling noted to area.

## 2023-10-09 NOTE — DISCHARGE INSTRUCTIONS
Call office in am to arrange follow up appt use antibiotics as directed, return to er if fever severe pain severe swelling

## 2023-10-09 NOTE — ED PROVIDER NOTES
arm band  Location:     Location:  Arm    Arm location:  R forearm    Depth: Intramuscular    Tendon involvement:  None  Pre-procedure details:     Imaging:  X-ray    Neurovascular status: intact      Preparation: Patient was prepped and draped in usual sterile fashion    Anesthesia:     Anesthesia method:  Local infiltration    Local anesthetic:  Lidocaine 1% WITH epi  Procedure type:     Procedure complexity:  Simple  Procedure details:     Incision length:  1 cm    Localization method:  Probed    Dissection of underlying tissues: no      Bloodless field: no      Foreign bodies recovered:  None    Intact foreign body removal: no    Post-procedure details:     Neurovascular status: intact      Skin closure:  None    Dressing:  Bulky dressing    Procedure completion:  Tolerated well, no immediate complications  Comments:      Purulent drainage from incision but foreign body not able to be removed   will refer to Ortho   XR Forearm Right    Narrative    EXAMINATION: Right forearm    HISTORY: possible  fb.      TECHNIQUE: Two views of the right forearm. COMPARISON: None available. FINDINGS:   There is no evidence of acute fracture or dislocation. Joint spaces are maintained. Soft tissues are within normal limits. There is a 2 cm radiopaque foreign body projecting central and lateral to the  mid radius. Impression    No evidence of acute fracture or dislocation within the right forearm. There is a 2 cm radiopaque foreign body projecting central and lateral to the  mid radius.      Results for orders placed or performed in visit on 10/09/23   Transthoracic echocardiogram (TTE) complete with contrast, bubble, strain, and 3D PRN   Result Value Ref Range    Body Surface Area 2.39 m2    LV EDV A2C 96 mL    LV EDV A4C 106 mL    LV ESV A2C 42 mL    LV ESV A4C 43 mL    IVSd 1.5 (A) 0.6 - 1.0 cm    LVIDd 5.0 4.2 - 5.9 cm    LVIDs 3.6 cm    LVOT Diameter 2.0 cm    LVOT Mean Gradient 3 mmHg    LVOT VTI 21.5 cm

## 2023-10-13 ENCOUNTER — OFFICE VISIT (OUTPATIENT)
Dept: ORTHOPEDIC SURGERY | Age: 73
End: 2023-10-13

## 2023-10-13 DIAGNOSIS — S51.841A: Primary | ICD-10-CM

## 2023-10-13 RX ORDER — METHYLPREDNISOLONE 4 MG/1
TABLET ORAL
Qty: 1 KIT | Refills: 0 | Status: SHIPPED | OUTPATIENT
Start: 2023-10-13

## 2023-10-13 NOTE — PROGRESS NOTES
The patient was prescribed and fitted with a Reparel Arm sleeve for the right arm, size large. Patient read and signed documenting they understand and agree to [facility]'s current DME return policy.

## 2023-10-13 NOTE — PROGRESS NOTES
Orthopaedic Hand Clinic Note    Name: Chica Rizvi  YOB: 1950  Gender: male  MRN: [id number]      CC: Patient referred for evaluation of right forearm injury    HPI: Pedro Mead is a 72 y.o. male right hand dominant with a chief complaint of right forearm injury. He reports on October 5, 2023 he had his drill laying on a metal roof while he was installing soffit's. He says the drill slid off the roof puncturing him in the right forearm. He said the drill bit broke off. He went to the emergency room on Monday, October 9, 2023 due to pain and swelling. X-rays were obtained. They made a small puncture wound and tried to remove the foreign body but were unsuccessful. He was placed on clindamycin 450 mg 4 times daily x10 days. He notes some pain, swelling, and drainage. He denies numbness and tingling. He is on Eliquis 5 mg twice daily. He has not taken any since Tuesday evening. ROS/Meds/PSH/PMH/FH/SH: I personally reviewed the patients standard intake form. Pertinents are discussed in the HPI    Physical Examination:  General: Awake and alert. HEENT: Normocephalic, atraumatic  CV/Pulm: Breathing even and unlabored  Skin: No obvious rashes noted. Lymphatic: No obvious evidence of lymphedema or lymphadenopathy    Musculoskeletal Exam:  Examination on the right upper extremity demonstrates cap refill < 5 seconds in all fingers  Patient has swelling to the right forearm, hand, fingers. There is a small puncture wound on the dorsal aspect of his forearm. There is some mild erythema. He has swelling. He denies paresthesia. His capillary refills within normal limits. Imaging / Electrodiagnostic Tests:     X-rays included 2 view right forearm are independently reviewed and interpreted. There is a foreign body seen on x-ray consistent with a drill bit. Assessment:   1.  Puncture wound of forearm with foreign body, right, initial encounter        Plan:   We discussed

## 2023-10-17 ENCOUNTER — OFFICE VISIT (OUTPATIENT)
Dept: ORTHOPEDIC SURGERY | Age: 73
End: 2023-10-17
Payer: MEDICARE

## 2023-10-17 DIAGNOSIS — S51.841A: Primary | ICD-10-CM

## 2023-10-17 PROCEDURE — G8484 FLU IMMUNIZE NO ADMIN: HCPCS | Performed by: NURSE PRACTITIONER

## 2023-10-17 PROCEDURE — 1036F TOBACCO NON-USER: CPT | Performed by: NURSE PRACTITIONER

## 2023-10-17 PROCEDURE — G8427 DOCREV CUR MEDS BY ELIG CLIN: HCPCS | Performed by: NURSE PRACTITIONER

## 2023-10-17 PROCEDURE — 3017F COLORECTAL CA SCREEN DOC REV: CPT | Performed by: NURSE PRACTITIONER

## 2023-10-17 PROCEDURE — G8417 CALC BMI ABV UP PARAM F/U: HCPCS | Performed by: NURSE PRACTITIONER

## 2023-10-17 PROCEDURE — 99213 OFFICE O/P EST LOW 20 MIN: CPT | Performed by: NURSE PRACTITIONER

## 2023-10-17 PROCEDURE — 1123F ACP DISCUSS/DSCN MKR DOCD: CPT | Performed by: NURSE PRACTITIONER

## 2023-10-17 NOTE — PROGRESS NOTES
[facility] Note    Name: Sujatha Rizvi  YOB: 1950  Gender: male  MRN: [id number]      Follow up visit:   1. Puncture wound of forearm with foreign body, right, initial encounter        HPI: Jose Armando Goff is a 72 y.o. male who is following up for foreign body right forearm. He is 12 days out from injury. He said he is doing much better. He says the swelling and pain have improved. He says he has thought about surgical intervention versus nonsurgical intervention. He has talked to several family members. ROS/Meds/PSH/PMH/FH/SH: I personally reviewed the patients standard intake form. Pertinents are discussed in the HPI    Physical Examination:    Musculoskeletal Examination:  Examination on the right upper extremity demonstrates cap refill < 5 seconds in all fingers  Patient has swelling to the right forearm, hand, fingers, much less than last week. .  There is a small puncture wound on the dorsal aspect of his forearm. There is some mild erythema. He denies paresthesia. His capillary refill is within normal limits. Imaging / Electrodiagnostic Tests:     None    Assessment:     ICD-10-CM    1. Puncture wound of forearm with foreign body, right, initial encounter  I19.061P           Plan:   We discussed the diagnosis and different treatment options. We discussed observation, therapy, antiinflammatory medications and other pertinent treatment modalities. After discussing in detail the patient elects to proceed with continuing observation. He says that he does not want to pursue surgical intervention. He will see us as needed. .     Patient voiced accordance and understanding of the information provided and the formulated plan. All questions were answered to the patient's satisfaction during the encounter.     3 This is an acute uncomplicated problem/injury  Treatment at this time:  observation    LANDON Patel - CNP  Orthopaedic Surgery  10/17/23  8:33 AM

## 2023-10-26 ENCOUNTER — OFFICE VISIT (OUTPATIENT)
Age: 73
End: 2023-10-26

## 2023-10-26 VITALS
HEART RATE: 70 BPM | SYSTOLIC BLOOD PRESSURE: 128 MMHG | HEIGHT: 69 IN | WEIGHT: 254 LBS | DIASTOLIC BLOOD PRESSURE: 82 MMHG | BODY MASS INDEX: 37.62 KG/M2

## 2023-10-26 DIAGNOSIS — I10 ESSENTIAL HYPERTENSION, BENIGN: ICD-10-CM

## 2023-10-26 DIAGNOSIS — I48.3 TYPICAL ATRIAL FLUTTER (HCC): ICD-10-CM

## 2023-10-26 DIAGNOSIS — Z95.2 S/P TAVR (TRANSCATHETER AORTIC VALVE REPLACEMENT): Primary | Chronic | ICD-10-CM

## 2023-10-26 DIAGNOSIS — I50.20 HFREF (HEART FAILURE WITH REDUCED EJECTION FRACTION) (HCC): ICD-10-CM

## 2023-10-26 DIAGNOSIS — E78.5 DYSLIPIDEMIA: ICD-10-CM

## 2023-10-26 DIAGNOSIS — I65.23 BILATERAL CAROTID ARTERY STENOSIS: ICD-10-CM

## 2023-10-26 DIAGNOSIS — I25.10 CORONARY ARTERY DISEASE INVOLVING NATIVE CORONARY ARTERY OF NATIVE HEART WITHOUT ANGINA PECTORIS: ICD-10-CM

## 2023-10-26 DIAGNOSIS — M79.605 LEFT LEG PAIN: Chronic | ICD-10-CM

## 2023-10-26 ASSESSMENT — ENCOUNTER SYMPTOMS
ABDOMINAL PAIN: 0
BACK PAIN: 0
SHORTNESS OF BREATH: 0
COUGH: 0

## 2023-10-26 NOTE — PROGRESS NOTES
74 11/07/2022    AST 19 11/07/2022    ALT 27 11/07/2022    LABGLOM >60 01/07/2023    GFRAA >60 08/03/2022    AGRATIO 1.7 03/09/2020    GLOB 4.2 11/07/2022       Lab Results   Component Value Date/Time     01/07/2023 04:02 AM    K 4.0 01/07/2023 04:02 AM     01/07/2023 04:02 AM    CO2 27 01/07/2023 04:02 AM    BUN 18 01/07/2023 04:02 AM    CREATININE 0.60 01/07/2023 04:02 AM    GLUCOSE 158 01/07/2023 04:02 AM    CALCIUM 8.7 01/07/2023 04:02 AM        Lab Results   Component Value Date    WBC 10.8 01/07/2023    HGB 13.3 (L) 01/07/2023    HCT 40.5 (L) 01/07/2023    MCV 90.0 01/07/2023     01/07/2023             ASSESSMENT:    Doreen Johnston was seen today for congestive heart failure, hypertension and coronary artery disease. Diagnoses and all orders for this visit:    S/P TAVR (transcatheter aortic valve replacement) -status post TAVR with 29 mm [employer] evolute pro + 11/2022. He is stable on aspirin and Eliquis. Patient is [place] heart association class II CHF at this time. Echo 10/2023 with normal left ventricular systolic function and normal TAVR function. HFrEF (heart failure with reduced ejection fraction) (HCC) -LVEF normalized post atrial flutter ablation. Medications remain appropriate with valsartan 160mg daily and carvedilol 12.5 mg twice daily. LVEF remains normal by echocardiogram 10/2023    Essential hypertension, benign -blood pressure remains well controlled on Entresto and carvedilol as above. Coronary artery disease involving native coronary artery of native heart with other form of angina pectoris [facility]) -patient has diffuse pattern CAD. Currently on aggressive CV risk reduction. Dyslipidemia -continue high intensity statin therapy with atorvastatin 80 mg daily. Typical atrial flutter (HCC) -patient status post ablation with no recurrence. Remains on Eliquis.     Stenosis of carotid artery, unspecified laterality    Left leg pain -s/p left common femoral

## 2023-11-02 ENCOUNTER — OFFICE VISIT (OUTPATIENT)
Dept: VASCULAR SURGERY | Age: 73
End: 2023-11-02
Payer: MEDICARE

## 2023-11-02 VITALS
BODY MASS INDEX: 37.92 KG/M2 | DIASTOLIC BLOOD PRESSURE: 103 MMHG | SYSTOLIC BLOOD PRESSURE: 175 MMHG | HEIGHT: 69 IN | WEIGHT: 256 LBS | HEART RATE: 81 BPM | OXYGEN SATURATION: 97 %

## 2023-11-02 DIAGNOSIS — I70.209 OCCLUSION OF COMMON FEMORAL ARTERY (HCC): Primary | ICD-10-CM

## 2023-11-02 PROCEDURE — G8427 DOCREV CUR MEDS BY ELIG CLIN: HCPCS | Performed by: STUDENT IN AN ORGANIZED HEALTH CARE EDUCATION/TRAINING PROGRAM

## 2023-11-02 PROCEDURE — G8484 FLU IMMUNIZE NO ADMIN: HCPCS | Performed by: STUDENT IN AN ORGANIZED HEALTH CARE EDUCATION/TRAINING PROGRAM

## 2023-11-02 PROCEDURE — 3077F SYST BP >= 140 MM HG: CPT | Performed by: STUDENT IN AN ORGANIZED HEALTH CARE EDUCATION/TRAINING PROGRAM

## 2023-11-02 PROCEDURE — 3017F COLORECTAL CA SCREEN DOC REV: CPT | Performed by: STUDENT IN AN ORGANIZED HEALTH CARE EDUCATION/TRAINING PROGRAM

## 2023-11-02 PROCEDURE — 99213 OFFICE O/P EST LOW 20 MIN: CPT | Performed by: STUDENT IN AN ORGANIZED HEALTH CARE EDUCATION/TRAINING PROGRAM

## 2023-11-02 PROCEDURE — 1036F TOBACCO NON-USER: CPT | Performed by: STUDENT IN AN ORGANIZED HEALTH CARE EDUCATION/TRAINING PROGRAM

## 2023-11-02 PROCEDURE — G8417 CALC BMI ABV UP PARAM F/U: HCPCS | Performed by: STUDENT IN AN ORGANIZED HEALTH CARE EDUCATION/TRAINING PROGRAM

## 2023-11-02 PROCEDURE — 1123F ACP DISCUSS/DSCN MKR DOCD: CPT | Performed by: STUDENT IN AN ORGANIZED HEALTH CARE EDUCATION/TRAINING PROGRAM

## 2023-11-02 PROCEDURE — 3080F DIAST BP >= 90 MM HG: CPT | Performed by: STUDENT IN AN ORGANIZED HEALTH CARE EDUCATION/TRAINING PROGRAM

## 2023-11-02 NOTE — PROGRESS NOTES
[address]   [address] FAX: [phone]    Sujatha Rizvi  : 1950      Reason for visit: s/p left femoral endarterectomy     Chief Complaint: 72 y.o. male left femoral endarterectomy with patch angioplasty 23 following TAVR procedure with left femoral access. Mows grass 3hrs weekly. Patient is compliant with aspirin, Eliquis, and statin. Plan: f/u 1yr with BLE Art DUS with ABIs    Level 3    Ambulatory status: Without claudication    Physical Examination:   Height: 1.753 m (5' 9\"), Weight - Scale: 116.1 kg (256 lb), BP: (!) 175/103    General:Well-developed. No distress. HENT: AT  CV: Regular rhythm. LUNG: Effort normal and breath sounds normal. No respiratory distress. Abdominal: Soft. Bowel sounds are normal. he exhibits no distension. There is no tenderness. There is no guarding. No hernia. Extremities:Psoriatic patches, no wounds of feet, motor and sensation intact   Vascular: Radial:, L, 2+ , R, 2+  , Femoral:, L, 2+ , R, 2+  , DP:, L, 2+ , R, 2+      Constitutional:   Negative for fevers and unexplained weight loss. Eyes:   Negative for visual disturbance. ENT:   Negative for significant hearing loss and tinnitus. Respiratory:   Negative for hemoptysis. Cardiovascular:   Negative except as noted in HPI. Gastrointestinal:   Negative for melena and abdominal pain. Genitourinary:   Negative for hematuria, renal stones. Integumentary:   Negative for rash or non-healing wounds  Hematologic/Lymphatic:   Negative for excessive bleeding hx or clotting disorder. Musculoskeletal:  Negative for active, unexplained/severe joint pain. Neurological:   Negative for stroke. Behavioral/Psych:   Negative for suicidal ideations. Endocrine:   Negative for uncontrolled diabetic symptoms including polyuria, polydipsia and poor wound healing.      Complexity of illness:  DM, HTN, HLD, CHF, Afib, CAD, and PAD    Statin: Yes     DAPT/AC:

## 2024-03-12 ENCOUNTER — OFFICE VISIT (OUTPATIENT)
Age: 74
End: 2024-03-12
Payer: MEDICARE

## 2024-03-12 VITALS
DIASTOLIC BLOOD PRESSURE: 88 MMHG | BODY MASS INDEX: 37.33 KG/M2 | WEIGHT: 252 LBS | HEIGHT: 69 IN | SYSTOLIC BLOOD PRESSURE: 138 MMHG

## 2024-03-12 DIAGNOSIS — I50.20 HFREF (HEART FAILURE WITH REDUCED EJECTION FRACTION) (HCC): ICD-10-CM

## 2024-03-12 DIAGNOSIS — I10 HYPERTENSION, ESSENTIAL: ICD-10-CM

## 2024-03-12 DIAGNOSIS — I25.10 CORONARY ARTERY DISEASE INVOLVING NATIVE CORONARY ARTERY OF NATIVE HEART WITHOUT ANGINA PECTORIS: ICD-10-CM

## 2024-03-12 DIAGNOSIS — Z95.2 S/P TAVR (TRANSCATHETER AORTIC VALVE REPLACEMENT): Primary | ICD-10-CM

## 2024-03-12 DIAGNOSIS — I48.91 ATRIAL FIBRILLATION, UNSPECIFIED TYPE (HCC): ICD-10-CM

## 2024-03-12 PROCEDURE — 99214 OFFICE O/P EST MOD 30 MIN: CPT | Performed by: INTERNAL MEDICINE

## 2024-03-12 PROCEDURE — 1036F TOBACCO NON-USER: CPT | Performed by: INTERNAL MEDICINE

## 2024-03-12 PROCEDURE — 93000 ELECTROCARDIOGRAM COMPLETE: CPT | Performed by: INTERNAL MEDICINE

## 2024-03-12 PROCEDURE — G8427 DOCREV CUR MEDS BY ELIG CLIN: HCPCS | Performed by: INTERNAL MEDICINE

## 2024-03-12 PROCEDURE — 3017F COLORECTAL CA SCREEN DOC REV: CPT | Performed by: INTERNAL MEDICINE

## 2024-03-12 PROCEDURE — G8417 CALC BMI ABV UP PARAM F/U: HCPCS | Performed by: INTERNAL MEDICINE

## 2024-03-12 PROCEDURE — 3079F DIAST BP 80-89 MM HG: CPT | Performed by: INTERNAL MEDICINE

## 2024-03-12 PROCEDURE — G8484 FLU IMMUNIZE NO ADMIN: HCPCS | Performed by: INTERNAL MEDICINE

## 2024-03-12 PROCEDURE — 3075F SYST BP GE 130 - 139MM HG: CPT | Performed by: INTERNAL MEDICINE

## 2024-03-12 PROCEDURE — 1123F ACP DISCUSS/DSCN MKR DOCD: CPT | Performed by: INTERNAL MEDICINE

## 2024-03-12 RX ORDER — DAPAGLIFLOZIN 10 MG/1
10 TABLET, FILM COATED ORAL EVERY MORNING
Qty: 90 TABLET | Refills: 3 | Status: SHIPPED | OUTPATIENT
Start: 2024-03-12

## 2024-03-12 ASSESSMENT — ENCOUNTER SYMPTOMS
STRIDOR: 0
COUGH: 0
APHONIA: 0
EYE PAIN: 0
ABDOMINAL PAIN: 0
NAIL CHANGES: 0

## 2024-03-12 NOTE — PROGRESS NOTES
External ear normal.      Nose: Nose normal.   Eyes:      General: No scleral icterus.  Cardiovascular:      Heart sounds: Murmur heard.      Systolic murmur is present.   Pulmonary:      Effort: Pulmonary effort is normal.   Abdominal:      General: There is no distension.   Musculoskeletal:      Cervical back: Neck supple.   Skin:     General: Skin is warm.      Findings: Rash present.   Neurological:      Mental Status: He is alert. Mental status is at baseline.         Medical problems and test results were reviewed with the patient today.           No results found for this or any previous visit (from the past 672 hour(s)).    Lab Results   Component Value Date/Time    CHOL 99 07/09/2022 06:40 AM    HDL 29 07/09/2022 06:40 AM       No results found for any visits on 03/12/24.        LOLITA Schulte was seen today for 6 month follow-up and coronary artery disease.    Diagnoses and all orders for this visit:    S/P TAVR (transcatheter aortic valve replacement)  -     EKG 12 Lead - Clinic Performed    HFrEF (heart failure with reduced ejection fraction) ([facility])  -     EKG 12 Lead - Clinic Performed    Atrial fibrillation, unspecified type ([facility])  -     EKG 12 Lead - Clinic Performed    Hypertension, essential  -     EKG 12 Lead - Clinic Performed    Coronary artery disease involving native coronary artery of native heart without angina pectoris  -     EKG 12 Lead - Clinic Performed    Other orders  -     dapagliflozin (FARXIGA) 10 MG tablet; Take 1 tablet by mouth every morning      Return in about 6 months (around 9/12/2024).       Umair Degroot MD  3/12/2024  8:21 AM

## 2024-09-10 ENCOUNTER — OFFICE VISIT (OUTPATIENT)
Age: 74
End: 2024-09-10
Payer: MEDICARE

## 2024-09-10 VITALS
WEIGHT: 257 LBS | HEART RATE: 78 BPM | HEIGHT: 69 IN | DIASTOLIC BLOOD PRESSURE: 70 MMHG | BODY MASS INDEX: 38.06 KG/M2 | SYSTOLIC BLOOD PRESSURE: 140 MMHG

## 2024-09-10 DIAGNOSIS — E11.65 TYPE 2 DIABETES MELLITUS WITH HYPERGLYCEMIA, WITHOUT LONG-TERM CURRENT USE OF INSULIN (HCC): ICD-10-CM

## 2024-09-10 DIAGNOSIS — I35.0 NONRHEUMATIC AORTIC VALVE STENOSIS: ICD-10-CM

## 2024-09-10 DIAGNOSIS — I44.7 LBBB (LEFT BUNDLE BRANCH BLOCK): ICD-10-CM

## 2024-09-10 DIAGNOSIS — I50.20 HFREF (HEART FAILURE WITH REDUCED EJECTION FRACTION) (HCC): ICD-10-CM

## 2024-09-10 DIAGNOSIS — I25.10 ASCVD (ARTERIOSCLEROTIC CARDIOVASCULAR DISEASE): ICD-10-CM

## 2024-09-10 DIAGNOSIS — Z95.2 S/P TAVR (TRANSCATHETER AORTIC VALVE REPLACEMENT): Primary | ICD-10-CM

## 2024-09-10 PROCEDURE — 3077F SYST BP >= 140 MM HG: CPT | Performed by: INTERNAL MEDICINE

## 2024-09-10 PROCEDURE — G8428 CUR MEDS NOT DOCUMENT: HCPCS | Performed by: INTERNAL MEDICINE

## 2024-09-10 PROCEDURE — G8417 CALC BMI ABV UP PARAM F/U: HCPCS | Performed by: INTERNAL MEDICINE

## 2024-09-10 PROCEDURE — 3078F DIAST BP <80 MM HG: CPT | Performed by: INTERNAL MEDICINE

## 2024-09-10 PROCEDURE — 1123F ACP DISCUSS/DSCN MKR DOCD: CPT | Performed by: INTERNAL MEDICINE

## 2024-09-10 PROCEDURE — 3046F HEMOGLOBIN A1C LEVEL >9.0%: CPT | Performed by: INTERNAL MEDICINE

## 2024-09-10 PROCEDURE — 1036F TOBACCO NON-USER: CPT | Performed by: INTERNAL MEDICINE

## 2024-09-10 PROCEDURE — 99214 OFFICE O/P EST MOD 30 MIN: CPT | Performed by: INTERNAL MEDICINE

## 2024-09-10 PROCEDURE — 2022F DILAT RTA XM EVC RTNOPTHY: CPT | Performed by: INTERNAL MEDICINE

## 2024-09-10 PROCEDURE — 3017F COLORECTAL CA SCREEN DOC REV: CPT | Performed by: INTERNAL MEDICINE

## 2024-09-10 RX ORDER — AMLODIPINE BESYLATE 5 MG/1
5 TABLET ORAL DAILY
COMMUNITY
Start: 2024-08-26

## 2024-09-10 RX ORDER — EZETIMIBE 10 MG/1
10 TABLET ORAL DAILY
Qty: 30 TABLET | Refills: 3 | Status: SHIPPED | OUTPATIENT
Start: 2024-09-10

## 2024-09-10 RX ORDER — EMPAGLIFLOZIN 25 MG/1
25 TABLET, FILM COATED ORAL DAILY
COMMUNITY
Start: 2024-08-26

## 2024-09-10 ASSESSMENT — ENCOUNTER SYMPTOMS
STRIDOR: 0
COUGH: 0
EYE PAIN: 0
APHONIA: 0
ABDOMINAL PAIN: 0
NAIL CHANGES: 0

## 2025-03-11 ENCOUNTER — RESULTS FOLLOW-UP (OUTPATIENT)
Age: 75
End: 2025-03-11

## 2025-03-11 NOTE — RESULT ENCOUNTER NOTE
Your most recent echocardiogram shows stable aortic valve function    Please let me know if you have additional questions or concerns.    Umair Degroot MD

## 2025-03-25 ENCOUNTER — OFFICE VISIT (OUTPATIENT)
Age: 75
End: 2025-03-25
Payer: MEDICARE

## 2025-03-25 VITALS
HEART RATE: 72 BPM | HEIGHT: 69 IN | DIASTOLIC BLOOD PRESSURE: 78 MMHG | BODY MASS INDEX: 38.16 KG/M2 | SYSTOLIC BLOOD PRESSURE: 134 MMHG | WEIGHT: 257.6 LBS

## 2025-03-25 DIAGNOSIS — I10 HYPERTENSION, ESSENTIAL: ICD-10-CM

## 2025-03-25 DIAGNOSIS — E11.65 TYPE 2 DIABETES MELLITUS WITH HYPERGLYCEMIA, WITHOUT LONG-TERM CURRENT USE OF INSULIN (HCC): ICD-10-CM

## 2025-03-25 DIAGNOSIS — I25.10 CORONARY ARTERY DISEASE INVOLVING NATIVE CORONARY ARTERY OF NATIVE HEART WITHOUT ANGINA PECTORIS: Primary | ICD-10-CM

## 2025-03-25 DIAGNOSIS — Z95.2 S/P TAVR (TRANSCATHETER AORTIC VALVE REPLACEMENT): ICD-10-CM

## 2025-03-25 PROCEDURE — 2022F DILAT RTA XM EVC RTNOPTHY: CPT | Performed by: INTERNAL MEDICINE

## 2025-03-25 PROCEDURE — G8417 CALC BMI ABV UP PARAM F/U: HCPCS | Performed by: INTERNAL MEDICINE

## 2025-03-25 PROCEDURE — 93000 ELECTROCARDIOGRAM COMPLETE: CPT | Performed by: INTERNAL MEDICINE

## 2025-03-25 PROCEDURE — 3046F HEMOGLOBIN A1C LEVEL >9.0%: CPT | Performed by: INTERNAL MEDICINE

## 2025-03-25 PROCEDURE — 99214 OFFICE O/P EST MOD 30 MIN: CPT | Performed by: INTERNAL MEDICINE

## 2025-03-25 PROCEDURE — 3075F SYST BP GE 130 - 139MM HG: CPT | Performed by: INTERNAL MEDICINE

## 2025-03-25 PROCEDURE — G8428 CUR MEDS NOT DOCUMENT: HCPCS | Performed by: INTERNAL MEDICINE

## 2025-03-25 PROCEDURE — 1123F ACP DISCUSS/DSCN MKR DOCD: CPT | Performed by: INTERNAL MEDICINE

## 2025-03-25 PROCEDURE — 1036F TOBACCO NON-USER: CPT | Performed by: INTERNAL MEDICINE

## 2025-03-25 PROCEDURE — 1126F AMNT PAIN NOTED NONE PRSNT: CPT | Performed by: INTERNAL MEDICINE

## 2025-03-25 PROCEDURE — 3017F COLORECTAL CA SCREEN DOC REV: CPT | Performed by: INTERNAL MEDICINE

## 2025-03-25 PROCEDURE — 3078F DIAST BP <80 MM HG: CPT | Performed by: INTERNAL MEDICINE

## 2025-03-25 RX ORDER — TIRZEPATIDE 2.5 MG/.5ML
2.5 INJECTION, SOLUTION SUBCUTANEOUS WEEKLY
Qty: 0.5 ML | Refills: 3 | Status: SHIPPED | OUTPATIENT
Start: 2025-03-25

## 2025-03-25 NOTE — PROGRESS NOTES
[facility], [address], [address]  [place] [address]  PHONE: [phone]    SUBJECTIVE:   Eris Rizvi is a 74 y.o. male 1950   seen for a follow up visit regarding the following:     Chief Complaint   Patient presents with    Coronary Artery Disease         History of Present Illness  The patient is a 74-year-old male with a medical history significant for aortic stenosis, heart failure with reduced ejection fraction (HFrEF), hypertension, atherosclerotic cardiovascular disease (ASCVD), diabetes mellitus, hyperglycemia, left bundle branch block, and hyperlipidemia.    The patient reports overall health status has been stable since his last appointment, with no hospitalizations. He experiences occasional dyspnea, which he attributes to his weight. His physical activity includes some exercise and gardening, and his diet primarily consists of steamed vegetables. He acknowledges the necessity for weight reduction, recalling a period 12 years ago when he successfully lost 40 pounds following surgery but has since regained most of the weight. He continues to struggle with motivation for weight loss. He was previously prescribed Ozempic but discontinued its use due to cost.    His current medications for diabetes management include Jardiance 25 mg daily and metformin 1 g twice daily. For hypertension, he is on amlodipine 5 mg daily, carvedilol 12.5 mg twice daily, and valsartan 160 mg daily. He takes Eliquis for atrial flutter. For hyperlipidemia, he is prescribed Zetia 10 mg daily and Lipitor 80 mg daily.    His sleep apnea is managed by sleeping on his left side.    SOCIAL HISTORY  - Exercise: Gardening  - Diet: Steamed vegetables        Interval history:  The patient presented for consultation  02/15/2018.  Patient with history of coronary artery disease and underwent cardiac catheterization and stenting last in 2012.  The patient had additional history of atrial

## (undated) DEVICE — CHECK-FLO PERFORMER INTRODUCER: Brand: PERFORMER

## (undated) DEVICE — NO DESCRIPTION: Brand: SENTINEL

## (undated) DEVICE — TUBING PMP FOR CARTO SYS SMARTABLATE

## (undated) DEVICE — SOLUTION IRRIG 1000ML 09% SOD CHL USP PIC PLAS CONTAINER

## (undated) DEVICE — STOCKINETTE,DOUBLE PLY,6X48,STERILE: Brand: MEDLINE

## (undated) DEVICE — MAJOR VASCULAR: Brand: MEDLINE INDUSTRIES, INC.

## (undated) DEVICE — GLIDESHEATH SLENDER STAINLESS STEEL KIT: Brand: GLIDESHEATH SLENDER

## (undated) DEVICE — TUBING, SUCTION, 1/4" X 10', STRAIGHT: Brand: MEDLINE

## (undated) DEVICE — LOOP VES L406MM DIA1MM MAXI BLU SIL RADPQ DISP

## (undated) DEVICE — LARGE (BLUE) FOR GRAFTS UP TO 10 MM, 20 1/2" / 52 CM (1/PKG): Brand: SCANLAN® VASCULAR TUNNELER SHEATHS AND BULLET TIPS

## (undated) DEVICE — SUTURE VCRL SZ 2-0 L36IN ABSRB UD L36MM CT-1 1/2 CIR J945H

## (undated) DEVICE — GUIDEWIRE VASC L260CM DIA0.035IN RAD 3MM J TIP L7CM PTFE

## (undated) DEVICE — SYSTEM CLOSURE 6-12 FR VEN VASC VASCADE MVP

## (undated) DEVICE — GLOVE,SURG,TRIUMPH MICRO,LTX,PF,7.5: Brand: MEDLINE

## (undated) DEVICE — CABG DR WILLIAMS: Brand: MEDLINE INDUSTRIES, INC.

## (undated) DEVICE — SHEATH INTRO L12CM DIA6FR W/ LUERLOCK HUB HEMSTAS VLV

## (undated) DEVICE — RADIFOCUS OPTITORQUE ANGIOGRAPHIC CATHETER: Brand: OPTITORQUE

## (undated) DEVICE — GUIDE NDL ST W/ 14 X 147CM TELESCOPICALLY FLD CIV FLX CVR

## (undated) DEVICE — 12 FOOT DISPOSABLE EXTENSION CABLE WITH SAFE CONNECT / SCREW-DOWN

## (undated) DEVICE — LOOP VES W13MM THK09MM MINI RED SIL FLD REPELLENT

## (undated) DEVICE — SUTURE NONABSORBABLE MONOFILAMENT 5-0 C-1 1X24 IN PROLENE 8725H

## (undated) DEVICE — Device

## (undated) DEVICE — PATCH REF EXT FOR CARTO 3 SYS (EA = 6 PACKS)

## (undated) DEVICE — CATHETER ANGIO INFIN 038IN AMPLATZ 534545T

## (undated) DEVICE — SUTURE PROL SZ 5-0 L36IN NONABSORBABLE BLU L13MM C-1 3/8 8720H

## (undated) DEVICE — CATHETER ABLAT 8FR L115CM 1-6-2MM SPC TIP 3.5MM FJ CRV

## (undated) DEVICE — GUIDEWIRE 035IN 210CM PTFE COAT FIX COR J TIP 15MM FIRM BODY

## (undated) DEVICE — DRAPE IRRIG FLD WRM W44XL44IN W/ AORN STD PRTBL INTRATEMP

## (undated) DEVICE — PINNACLE INTRODUCER SHEATH: Brand: PINNACLE

## (undated) DEVICE — PINNACLE TIF INTRODUCER SHEATH: Brand: PINNACLE

## (undated) DEVICE — SUTURE PERMAHAND SZ 0 L30IN NONABSORBABLE BLK L30MM PSL 3/8 590H

## (undated) DEVICE — CATHETER EP 7FR L115CM 2-8-2MM SPC TIP 2MM 10 ELECTRD FJ

## (undated) DEVICE — SOLUTION IRRIG 3000ML 0.9% SOD CHL USP UROMATIC PLAS CONT

## (undated) DEVICE — 18G NG KIT WITH 96IN PROBE COVER (10 PK): Brand: SITE-RITE

## (undated) DEVICE — PLEDGET VASC W3/16XL3/8IN THK1/16IN PTFE SFT

## (undated) DEVICE — 3M™ TEGADERM™ TRANSPARENT FILM DRESSING FRAME STYLE, 1626W, 4 IN X 4-3/4 IN (10 CM X 12 CM), 50/CT 4CT/CASE: Brand: 3M™ TEGADERM™

## (undated) DEVICE — TTL1LYR 16FR10ML 100%SIL TMPST TR: Brand: MEDLINE

## (undated) DEVICE — PAD THRM L UNIV NONSLIP HYDRGEL RECT PROVIDE OPTIMAL ENERGY

## (undated) DEVICE — DEVICE COMPR LNG 27 CM VASC BND

## (undated) DEVICE — TR BAND RADIAL ARTERY COMPRESSION DEVICE: Brand: TR BAND

## (undated) DEVICE — LOADING SYS L-EVPROP2329US: Brand: EVOLUT™ PRO+

## (undated) DEVICE — SWAN-GANZ BIPOLAR PACING CATHETER: Brand: SWAN-GANZ

## (undated) DEVICE — PERCLOSE™ PROSTYLE™ SUTURE-MEDIATED CLOSURE AND REPAIR SYSTEM: Brand: PERCLOSE™ PROSTYLE™

## (undated) DEVICE — SYRINGE, LUER LOCK, 60ML: Brand: MEDLINE

## (undated) DEVICE — DRAPE SURG SPEC PROC 4 PC

## (undated) DEVICE — APPLIER CLP L9.38IN M LIG TI DISP STR RNG HNDL LIGACLP

## (undated) DEVICE — COVER,TABLE,HEAVY DUTY,79"X110",STRL: Brand: MEDLINE

## (undated) DEVICE — CATHETER DIAG AD 5FR L110CM 145DEG COR GRY HYDRPHLC NYL

## (undated) DEVICE — DELIV SYS D-EVPROP2329US: Brand: EVOLUT™ PRO+

## (undated) DEVICE — GUIDEWIRE VASC L260CM DIA0.035IN TAPR L11CM FLPY TIP L4CM

## (undated) DEVICE — SUTURE MCRYL SZ 4-0 L27IN ABSRB UD L19MM PS-2 1/2 CIR PRIM Y426H

## (undated) DEVICE — SUTURE PROL SZ 6-0 L24IN NONABSORBABLE BLU BV-1 L9.3MM 3/8 M8805

## (undated) DEVICE — STOPCOCK ANGIO 1050PSI DK BLU L ROT M LUER 3 W OFF HNDL

## (undated) DEVICE — BLADE CLIPPER GEN PURP NS

## (undated) DEVICE — GUIDEWIRE WITH ICE™ HYDROPHILIC COATING: Brand: CHOICE™

## (undated) DEVICE — GUIDEWIRE VASC L260CM DIA0.035IN TIP L3MM STD EXCHG PTFE J

## (undated) DEVICE — CATHETER DIAG AD 6FR L110CM 0.038IN COR POLYUR PGTL W/ 6

## (undated) DEVICE — CATHETER DIAG AD 6FR L100CM 0.038IN COR POLYUR INT MAMM

## (undated) DEVICE — ELECTRODE DEFIB CARD W/ LVP GEL MULTFUNC PRO-PADZ

## (undated) DEVICE — MICROPUNCTURE INTRODUCER SET SILHOUETTE TRANSITIONLESS WITH NITINOL WIRE GUIDE: Brand: MICROPUNCTURE

## (undated) DEVICE — SURGIFOAM SPNG SZ 100

## (undated) DEVICE — GUIDEWIRE VASC L150CM DIA0.035IN FLX TIP L7CM PTFE STR FIX